# Patient Record
Sex: FEMALE | Race: WHITE | NOT HISPANIC OR LATINO | Employment: OTHER | URBAN - METROPOLITAN AREA
[De-identification: names, ages, dates, MRNs, and addresses within clinical notes are randomized per-mention and may not be internally consistent; named-entity substitution may affect disease eponyms.]

---

## 2018-02-26 ENCOUNTER — RECORDS - HEALTHEAST (OUTPATIENT)
Dept: LAB | Facility: CLINIC | Age: 78
End: 2018-02-26

## 2018-02-28 ENCOUNTER — RECORDS - HEALTHEAST (OUTPATIENT)
Dept: LAB | Facility: CLINIC | Age: 78
End: 2018-02-28

## 2018-02-28 LAB
QTF INTERPRETATION: NORMAL
QTF MITOGEN - NIL: >10 IU/ML
QTF NIL: 0.06 IU/ML
QTF RESULT: NEGATIVE
QTF TB ANTIGEN - NIL: 0.05 IU/ML

## 2018-03-01 LAB
ANION GAP SERPL CALCULATED.3IONS-SCNC: 7 MMOL/L (ref 5–18)
BUN SERPL-MCNC: 12 MG/DL (ref 8–28)
CALCIUM SERPL-MCNC: 9.5 MG/DL (ref 8.5–10.5)
CHLORIDE BLD-SCNC: 103 MMOL/L (ref 98–107)
CO2 SERPL-SCNC: 30 MMOL/L (ref 22–31)
CREAT SERPL-MCNC: 0.64 MG/DL (ref 0.6–1.1)
GFR SERPL CREATININE-BSD FRML MDRD: >60 ML/MIN/1.73M2
GLUCOSE BLD-MCNC: 103 MG/DL (ref 70–125)
POTASSIUM BLD-SCNC: 3.8 MMOL/L (ref 3.5–5)
SODIUM SERPL-SCNC: 140 MMOL/L (ref 136–145)

## 2018-03-02 ENCOUNTER — RECORDS - HEALTHEAST (OUTPATIENT)
Dept: LAB | Facility: CLINIC | Age: 78
End: 2018-03-02

## 2018-03-05 LAB
ANION GAP SERPL CALCULATED.3IONS-SCNC: 7 MMOL/L (ref 5–18)
BUN SERPL-MCNC: 12 MG/DL (ref 8–28)
CALCIUM SERPL-MCNC: 9.7 MG/DL (ref 8.5–10.5)
CHLORIDE BLD-SCNC: 101 MMOL/L (ref 98–107)
CO2 SERPL-SCNC: 30 MMOL/L (ref 22–31)
CREAT SERPL-MCNC: 0.59 MG/DL (ref 0.6–1.1)
ERYTHROCYTE [DISTWIDTH] IN BLOOD BY AUTOMATED COUNT: 16.6 % (ref 11–14.5)
FERRITIN SERPL-MCNC: 45 NG/ML (ref 10–130)
GFR SERPL CREATININE-BSD FRML MDRD: >60 ML/MIN/1.73M2
GLUCOSE BLD-MCNC: 106 MG/DL (ref 70–125)
HCT VFR BLD AUTO: 33.7 % (ref 35–47)
HGB BLD-MCNC: 10.2 G/DL (ref 12–16)
IRON SATN MFR SERPL: 9 % (ref 20–50)
IRON SERPL-MCNC: 26 UG/DL (ref 42–175)
IRON SERPL-MCNC: 26 UG/DL (ref 42–175)
MCH RBC QN AUTO: 30.3 PG (ref 27–34)
MCHC RBC AUTO-ENTMCNC: 30.3 G/DL (ref 32–36)
MCV RBC AUTO: 100 FL (ref 80–100)
PLATELET # BLD AUTO: 311 THOU/UL (ref 140–440)
PMV BLD AUTO: 11.4 FL (ref 8.5–12.5)
POTASSIUM BLD-SCNC: 3.8 MMOL/L (ref 3.5–5)
RBC # BLD AUTO: 3.37 MILL/UL (ref 3.8–5.4)
RETICS # AUTO: 0.07 MILL/UL (ref 0.01–0.11)
SODIUM SERPL-SCNC: 138 MMOL/L (ref 136–145)
TIBC SERPL-MCNC: 298 UG/DL (ref 313–563)
TRANSFERRIN SERPL-MCNC: 238 MG/DL (ref 212–360)
WBC: 8.6 THOU/UL (ref 4–11)

## 2018-03-16 ENCOUNTER — RECORDS - HEALTHEAST (OUTPATIENT)
Dept: LAB | Facility: CLINIC | Age: 78
End: 2018-03-16

## 2018-03-18 ENCOUNTER — RECORDS - HEALTHEAST (OUTPATIENT)
Dept: LAB | Facility: CLINIC | Age: 78
End: 2018-03-18

## 2018-03-19 LAB
25(OH)D3 SERPL-MCNC: 69.9 NG/ML (ref 30–80)
ANION GAP SERPL CALCULATED.3IONS-SCNC: 10 MMOL/L (ref 5–18)
BASOPHILS # BLD AUTO: 0 THOU/UL (ref 0–0.2)
BASOPHILS NFR BLD AUTO: 0 % (ref 0–2)
BUN SERPL-MCNC: 17 MG/DL (ref 8–28)
CALCIUM SERPL-MCNC: 9.3 MG/DL (ref 8.5–10.5)
CHLORIDE BLD-SCNC: 100 MMOL/L (ref 98–107)
CO2 SERPL-SCNC: 26 MMOL/L (ref 22–31)
CREAT SERPL-MCNC: 0.62 MG/DL (ref 0.6–1.1)
EOSINOPHIL # BLD AUTO: 0 THOU/UL (ref 0–0.4)
EOSINOPHIL NFR BLD AUTO: 0 % (ref 0–6)
ERYTHROCYTE [DISTWIDTH] IN BLOOD BY AUTOMATED COUNT: 14.8 % (ref 11–14.5)
GFR SERPL CREATININE-BSD FRML MDRD: >60 ML/MIN/1.73M2
GLUCOSE BLD-MCNC: 103 MG/DL (ref 70–125)
HCT VFR BLD AUTO: 37.4 % (ref 35–47)
HGB BLD-MCNC: 11.7 G/DL (ref 12–16)
LYMPHOCYTES # BLD AUTO: 1.9 THOU/UL (ref 0.8–4.4)
LYMPHOCYTES NFR BLD AUTO: 22 % (ref 20–40)
MCH RBC QN AUTO: 30.3 PG (ref 27–34)
MCHC RBC AUTO-ENTMCNC: 31.3 G/DL (ref 32–36)
MCV RBC AUTO: 97 FL (ref 80–100)
MONOCYTES # BLD AUTO: 0.7 THOU/UL (ref 0–0.9)
MONOCYTES NFR BLD AUTO: 8 % (ref 2–10)
NEUTROPHILS # BLD AUTO: 5.9 THOU/UL (ref 2–7.7)
NEUTROPHILS NFR BLD AUTO: 70 % (ref 50–70)
PLATELET # BLD AUTO: 214 THOU/UL (ref 140–440)
PMV BLD AUTO: 11.6 FL (ref 8.5–12.5)
POTASSIUM BLD-SCNC: 3.7 MMOL/L (ref 3.5–5)
RBC # BLD AUTO: 3.86 MILL/UL (ref 3.8–5.4)
SODIUM SERPL-SCNC: 136 MMOL/L (ref 136–145)
TSH SERPL DL<=0.005 MIU/L-ACNC: 3.4 UIU/ML (ref 0.3–5)
WBC: 8.5 THOU/UL (ref 4–11)

## 2018-03-23 ENCOUNTER — RECORDS - HEALTHEAST (OUTPATIENT)
Dept: LAB | Facility: CLINIC | Age: 78
End: 2018-03-23

## 2018-03-23 LAB
ANION GAP SERPL CALCULATED.3IONS-SCNC: 10 MMOL/L (ref 5–18)
BUN SERPL-MCNC: 9 MG/DL (ref 8–28)
CALCIUM SERPL-MCNC: 8.5 MG/DL (ref 8.5–10.5)
CHLORIDE BLD-SCNC: 99 MMOL/L (ref 98–107)
CO2 SERPL-SCNC: 25 MMOL/L (ref 22–31)
CREAT SERPL-MCNC: 0.61 MG/DL (ref 0.6–1.1)
GFR SERPL CREATININE-BSD FRML MDRD: >60 ML/MIN/1.73M2
GLUCOSE BLD-MCNC: 106 MG/DL (ref 70–125)
POTASSIUM BLD-SCNC: 3.9 MMOL/L (ref 3.5–5)
SODIUM SERPL-SCNC: 134 MMOL/L (ref 136–145)

## 2018-04-30 ENCOUNTER — RECORDS - HEALTHEAST (OUTPATIENT)
Dept: LAB | Facility: CLINIC | Age: 78
End: 2018-04-30

## 2018-05-01 LAB
ANION GAP SERPL CALCULATED.3IONS-SCNC: 8 MMOL/L (ref 5–18)
BUN SERPL-MCNC: 17 MG/DL (ref 8–28)
CALCIUM SERPL-MCNC: 9.8 MG/DL (ref 8.5–10.5)
CHLORIDE BLD-SCNC: 102 MMOL/L (ref 98–107)
CO2 SERPL-SCNC: 29 MMOL/L (ref 22–31)
CREAT SERPL-MCNC: 0.65 MG/DL (ref 0.6–1.1)
GFR SERPL CREATININE-BSD FRML MDRD: >60 ML/MIN/1.73M2
GLUCOSE BLD-MCNC: 94 MG/DL (ref 70–125)
POTASSIUM BLD-SCNC: 3.8 MMOL/L (ref 3.5–5)
SODIUM SERPL-SCNC: 139 MMOL/L (ref 136–145)

## 2018-06-19 ENCOUNTER — RECORDS - HEALTHEAST (OUTPATIENT)
Dept: LAB | Facility: CLINIC | Age: 78
End: 2018-06-19

## 2018-06-20 LAB
CHOLEST SERPL-MCNC: 116 MG/DL
FASTING STATUS PATIENT QL REPORTED: YES
FERRITIN SERPL-MCNC: 72 NG/ML (ref 10–130)
HDLC SERPL-MCNC: 44 MG/DL
IRON SATN MFR SERPL: 19 % (ref 20–50)
IRON SERPL-MCNC: 49 UG/DL (ref 42–175)
IRON SERPL-MCNC: 49 UG/DL (ref 42–175)
LDLC SERPL CALC-MCNC: 52 MG/DL
TIBC SERPL-MCNC: 252 UG/DL (ref 313–563)
TRANSFERRIN SERPL-MCNC: 202 MG/DL (ref 212–360)
TRIGL SERPL-MCNC: 99 MG/DL
TSH SERPL DL<=0.005 MIU/L-ACNC: 3.47 UIU/ML (ref 0.3–5)

## 2018-06-21 LAB — 25(OH)D3 SERPL-MCNC: 78.2 NG/ML (ref 30–80)

## 2018-08-27 ENCOUNTER — RECORDS - HEALTHEAST (OUTPATIENT)
Dept: LAB | Facility: CLINIC | Age: 78
End: 2018-08-27

## 2018-08-28 LAB
BASOPHILS # BLD AUTO: 0.1 THOU/UL (ref 0–0.2)
BASOPHILS NFR BLD AUTO: 1 % (ref 0–2)
EOSINOPHIL # BLD AUTO: 0.3 THOU/UL (ref 0–0.4)
EOSINOPHIL NFR BLD AUTO: 3 % (ref 0–6)
ERYTHROCYTE [DISTWIDTH] IN BLOOD BY AUTOMATED COUNT: 14.2 % (ref 11–14.5)
HCT VFR BLD AUTO: 37.4 % (ref 35–47)
HGB BLD-MCNC: 11.5 G/DL (ref 12–16)
LYMPHOCYTES # BLD AUTO: 2.4 THOU/UL (ref 0.8–4.4)
LYMPHOCYTES NFR BLD AUTO: 29 % (ref 20–40)
MCH RBC QN AUTO: 29.1 PG (ref 27–34)
MCHC RBC AUTO-ENTMCNC: 30.7 G/DL (ref 32–36)
MCV RBC AUTO: 95 FL (ref 80–100)
MONOCYTES # BLD AUTO: 0.8 THOU/UL (ref 0–0.9)
MONOCYTES NFR BLD AUTO: 10 % (ref 2–10)
NEUTROPHILS # BLD AUTO: 4.8 THOU/UL (ref 2–7.7)
NEUTROPHILS NFR BLD AUTO: 58 % (ref 50–70)
PLATELET # BLD AUTO: 243 THOU/UL (ref 140–440)
PMV BLD AUTO: 10.8 FL (ref 8.5–12.5)
RBC # BLD AUTO: 3.95 MILL/UL (ref 3.8–5.4)
WBC: 8.4 THOU/UL (ref 4–11)

## 2019-01-08 ENCOUNTER — RECORDS - HEALTHEAST (OUTPATIENT)
Dept: LAB | Facility: CLINIC | Age: 79
End: 2019-01-08

## 2019-01-08 LAB
ANION GAP SERPL CALCULATED.3IONS-SCNC: 10 MMOL/L (ref 5–18)
BASOPHILS # BLD AUTO: 0 THOU/UL (ref 0–0.2)
BASOPHILS NFR BLD AUTO: 0 % (ref 0–2)
BUN SERPL-MCNC: 24 MG/DL (ref 8–28)
CALCIUM SERPL-MCNC: 9 MG/DL (ref 8.5–10.5)
CHLORIDE BLD-SCNC: 99 MMOL/L (ref 98–107)
CO2 SERPL-SCNC: 28 MMOL/L (ref 22–31)
CREAT SERPL-MCNC: 0.73 MG/DL (ref 0.6–1.1)
EOSINOPHIL # BLD AUTO: 0 THOU/UL (ref 0–0.4)
EOSINOPHIL NFR BLD AUTO: 0 % (ref 0–6)
ERYTHROCYTE [DISTWIDTH] IN BLOOD BY AUTOMATED COUNT: 14.7 % (ref 11–14.5)
GFR SERPL CREATININE-BSD FRML MDRD: >60 ML/MIN/1.73M2
GLUCOSE BLD-MCNC: 133 MG/DL (ref 70–125)
HCT VFR BLD AUTO: 39.9 % (ref 35–47)
HGB BLD-MCNC: 12.6 G/DL (ref 12–16)
LYMPHOCYTES # BLD AUTO: 0.3 THOU/UL (ref 0.8–4.4)
LYMPHOCYTES NFR BLD AUTO: 2 % (ref 20–40)
MAGNESIUM SERPL-MCNC: 2.1 MG/DL (ref 1.8–2.6)
MCH RBC QN AUTO: 29.6 PG (ref 27–34)
MCHC RBC AUTO-ENTMCNC: 31.6 G/DL (ref 32–36)
MCV RBC AUTO: 94 FL (ref 80–100)
MONOCYTES # BLD AUTO: 0.2 THOU/UL (ref 0–0.9)
MONOCYTES NFR BLD AUTO: 1 % (ref 2–10)
NEUTROPHILS # BLD AUTO: 14.2 THOU/UL (ref 2–7.7)
NEUTROPHILS NFR BLD AUTO: 96 % (ref 50–70)
PHOSPHATE SERPL-MCNC: 2.9 MG/DL (ref 2.5–4.5)
PLATELET # BLD AUTO: 207 THOU/UL (ref 140–440)
PMV BLD AUTO: 11.1 FL (ref 8.5–12.5)
POTASSIUM BLD-SCNC: 4.4 MMOL/L (ref 3.5–5)
RBC # BLD AUTO: 4.25 MILL/UL (ref 3.8–5.4)
SODIUM SERPL-SCNC: 137 MMOL/L (ref 136–145)
WBC: 14.8 THOU/UL (ref 4–11)

## 2019-04-05 ENCOUNTER — RECORDS - HEALTHEAST (OUTPATIENT)
Dept: LAB | Facility: CLINIC | Age: 79
End: 2019-04-05

## 2019-04-08 ENCOUNTER — RECORDS - HEALTHEAST (OUTPATIENT)
Dept: LAB | Facility: CLINIC | Age: 79
End: 2019-04-08

## 2019-04-08 LAB
ANION GAP SERPL CALCULATED.3IONS-SCNC: 8 MMOL/L (ref 5–18)
BUN SERPL-MCNC: 15 MG/DL (ref 8–28)
CALCIUM SERPL-MCNC: 9.4 MG/DL (ref 8.5–10.5)
CHLORIDE BLD-SCNC: 98 MMOL/L (ref 98–107)
CO2 SERPL-SCNC: 33 MMOL/L (ref 22–31)
CREAT SERPL-MCNC: 0.63 MG/DL (ref 0.6–1.1)
CREAT UR-MCNC: 33.7 MG/DL
CREAT UR-MCNC: 33.7 MG/DL
ERYTHROCYTE [DISTWIDTH] IN BLOOD BY AUTOMATED COUNT: 13.6 % (ref 11–14.5)
GFR SERPL CREATININE-BSD FRML MDRD: >60 ML/MIN/1.73M2
GLUCOSE BLD-MCNC: 144 MG/DL (ref 70–125)
HBA1C MFR BLD: 6.5 % (ref 4.2–6.1)
HCT VFR BLD AUTO: 38.4 % (ref 35–47)
HGB BLD-MCNC: 11.8 G/DL (ref 12–16)
MCH RBC QN AUTO: 29.7 PG (ref 27–34)
MCHC RBC AUTO-ENTMCNC: 30.7 G/DL (ref 32–36)
MCV RBC AUTO: 97 FL (ref 80–100)
MICROALBUMIN UR-MCNC: <0.5 MG/DL (ref 0–1.99)
MICROALBUMIN/CREAT UR: NORMAL MG/G
PLATELET # BLD AUTO: 198 THOU/UL (ref 140–440)
PMV BLD AUTO: 10.9 FL (ref 8.5–12.5)
POTASSIUM BLD-SCNC: 4.1 MMOL/L (ref 3.5–5)
PROTEIN, RANDOM URINE - HISTORICAL: 9 MG/DL
PROTEIN/CREAT RATIO, RANDOM UR: 0.27
RBC # BLD AUTO: 3.97 MILL/UL (ref 3.8–5.4)
SODIUM SERPL-SCNC: 139 MMOL/L (ref 136–145)
TSH SERPL DL<=0.005 MIU/L-ACNC: 3.54 UIU/ML (ref 0.3–5)
WBC: 7 THOU/UL (ref 4–11)

## 2020-01-06 ENCOUNTER — RECORDS - HEALTHEAST (OUTPATIENT)
Dept: LAB | Facility: CLINIC | Age: 80
End: 2020-01-06

## 2020-01-06 LAB
ANION GAP SERPL CALCULATED.3IONS-SCNC: 10 MMOL/L (ref 5–18)
BUN SERPL-MCNC: 17 MG/DL (ref 8–28)
CALCIUM SERPL-MCNC: 9.3 MG/DL (ref 8.5–10.5)
CHLORIDE BLD-SCNC: 103 MMOL/L (ref 98–107)
CO2 SERPL-SCNC: 27 MMOL/L (ref 22–31)
CREAT SERPL-MCNC: 0.68 MG/DL (ref 0.6–1.1)
ERYTHROCYTE [DISTWIDTH] IN BLOOD BY AUTOMATED COUNT: 13.4 % (ref 11–14.5)
GFR SERPL CREATININE-BSD FRML MDRD: >60 ML/MIN/1.73M2
GLUCOSE BLD-MCNC: 123 MG/DL (ref 70–125)
HBA1C MFR BLD: 6.4 % (ref 4.2–6.1)
HCT VFR BLD AUTO: 39.4 % (ref 35–47)
HGB BLD-MCNC: 11.8 G/DL (ref 12–16)
MCH RBC QN AUTO: 29.6 PG (ref 27–34)
MCHC RBC AUTO-ENTMCNC: 29.9 G/DL (ref 32–36)
MCV RBC AUTO: 99 FL (ref 80–100)
PLATELET # BLD AUTO: 218 THOU/UL (ref 140–440)
PMV BLD AUTO: 11.1 FL (ref 8.5–12.5)
POTASSIUM BLD-SCNC: 4.6 MMOL/L (ref 3.5–5)
RBC # BLD AUTO: 3.99 MILL/UL (ref 3.8–5.4)
SODIUM SERPL-SCNC: 140 MMOL/L (ref 136–145)
TSH SERPL DL<=0.005 MIU/L-ACNC: 5.99 UIU/ML (ref 0.3–5)
WBC: 9.2 THOU/UL (ref 4–11)

## 2020-01-08 ENCOUNTER — RECORDS - HEALTHEAST (OUTPATIENT)
Dept: LAB | Facility: CLINIC | Age: 80
End: 2020-01-08

## 2020-01-08 LAB
T3FREE SERPL-MCNC: 1.9 PG/ML (ref 1.9–3.9)
T4 FREE SERPL-MCNC: 0.5 NG/DL (ref 0.7–1.8)

## 2020-02-11 ENCOUNTER — RECORDS - HEALTHEAST (OUTPATIENT)
Dept: LAB | Facility: CLINIC | Age: 80
End: 2020-02-11

## 2020-02-12 LAB — TSH SERPL DL<=0.005 MIU/L-ACNC: 10.81 UIU/ML (ref 0.3–5)

## 2020-03-26 ENCOUNTER — RECORDS - HEALTHEAST (OUTPATIENT)
Dept: LAB | Facility: CLINIC | Age: 80
End: 2020-03-26

## 2020-03-26 LAB — TSH SERPL DL<=0.005 MIU/L-ACNC: 1.13 UIU/ML (ref 0.3–5)

## 2020-11-10 ENCOUNTER — TRANSFERRED RECORDS (OUTPATIENT)
Dept: HEALTH INFORMATION MANAGEMENT | Facility: CLINIC | Age: 80
End: 2020-11-10

## 2020-11-11 ENCOUNTER — HOSPITAL ENCOUNTER (INPATIENT)
Facility: CLINIC | Age: 80
LOS: 14 days | Discharge: NURSING, REHAB OR LONG-TERM CARE | DRG: 177 | End: 2020-11-25
Attending: INTERNAL MEDICINE | Admitting: HOSPITALIST
Payer: COMMERCIAL

## 2020-11-11 ENCOUNTER — TRANSFERRED RECORDS (OUTPATIENT)
Dept: HEALTH INFORMATION MANAGEMENT | Facility: CLINIC | Age: 80
End: 2020-11-11

## 2020-11-11 DIAGNOSIS — J44.1 CHRONIC OBSTRUCTIVE PULMONARY DISEASE WITH ACUTE EXACERBATION (H): ICD-10-CM

## 2020-11-11 DIAGNOSIS — J96.21 ACUTE ON CHRONIC RESPIRATORY FAILURE WITH HYPOXIA (H): Primary | ICD-10-CM

## 2020-11-11 DIAGNOSIS — U07.1 PNEUMONIA DUE TO 2019 NOVEL CORONAVIRUS: ICD-10-CM

## 2020-11-11 DIAGNOSIS — E78.2 MIXED HYPERLIPIDEMIA: ICD-10-CM

## 2020-11-11 DIAGNOSIS — J12.82 PNEUMONIA DUE TO 2019 NOVEL CORONAVIRUS: ICD-10-CM

## 2020-11-11 DIAGNOSIS — U07.1 CLINICAL DIAGNOSIS OF COVID-19: ICD-10-CM

## 2020-11-11 DIAGNOSIS — I48.0 PAROXYSMAL ATRIAL FIBRILLATION WITH RVR (H): ICD-10-CM

## 2020-11-11 DIAGNOSIS — J44.1 COPD EXACERBATION (H): ICD-10-CM

## 2020-11-11 DIAGNOSIS — I50.21 ACUTE SYSTOLIC HEART FAILURE (H): ICD-10-CM

## 2020-11-11 PROBLEM — J96.90 RESPIRATORY FAILURE (H): Status: ACTIVE | Noted: 2020-11-11

## 2020-11-11 LAB
ALBUMIN SERPL-MCNC: 2.7 G/DL (ref 3.4–5)
ALP SERPL-CCNC: 63 U/L (ref 40–150)
ALT SERPL W P-5'-P-CCNC: 23 U/L (ref 0–50)
ANION GAP SERPL CALCULATED.3IONS-SCNC: 4 MMOL/L (ref 3–14)
AST SERPL W P-5'-P-CCNC: 36 U/L (ref 0–45)
BILIRUB DIRECT SERPL-MCNC: <0.1 MG/DL (ref 0–0.2)
BILIRUB SERPL-MCNC: 0.2 MG/DL (ref 0.2–1.3)
BUN SERPL-MCNC: 22 MG/DL (ref 7–30)
CALCIUM SERPL-MCNC: 7.9 MG/DL (ref 8.5–10.1)
CHLORIDE SERPL-SCNC: 109 MMOL/L (ref 94–109)
CO2 SERPL-SCNC: 28 MMOL/L (ref 20–32)
CREAT SERPL-MCNC: 0.55 MG/DL (ref 0.52–1.04)
GFR SERPL CREATININE-BSD FRML MDRD: 90 ML/MIN/{1.73_M2}
GLUCOSE BLDC GLUCOMTR-MCNC: 163 MG/DL (ref 70–99)
GLUCOSE SERPL-MCNC: 146 MG/DL (ref 70–99)
HBA1C MFR BLD: 6.2 % (ref 0–5.6)
POTASSIUM SERPL-SCNC: 4.6 MMOL/L (ref 3.4–5.3)
PROT SERPL-MCNC: 6.7 G/DL (ref 6.8–8.8)
RETICS # AUTO: 24.1 10E9/L (ref 25–95)
RETICS/RBC NFR AUTO: 0.8 % (ref 0.5–2)
SODIUM SERPL-SCNC: 141 MMOL/L (ref 133–144)

## 2020-11-11 PROCEDURE — 999N000157 HC STATISTIC RCP TIME EA 10 MIN

## 2020-11-11 PROCEDURE — 36415 COLL VENOUS BLD VENIPUNCTURE: CPT | Performed by: HOSPITALIST

## 2020-11-11 PROCEDURE — 120N000001 HC R&B MED SURG/OB

## 2020-11-11 PROCEDURE — 120N000013 HC R&B IMCU

## 2020-11-11 PROCEDURE — 250N000009 HC RX 250: Performed by: HOSPITALIST

## 2020-11-11 PROCEDURE — 80048 BASIC METABOLIC PNL TOTAL CA: CPT | Performed by: HOSPITALIST

## 2020-11-11 PROCEDURE — XW033E5 INTRODUCTION OF REMDESIVIR ANTI-INFECTIVE INTO PERIPHERAL VEIN, PERCUTANEOUS APPROACH, NEW TECHNOLOGY GROUP 5: ICD-10-PCS | Performed by: HOSPITALIST

## 2020-11-11 PROCEDURE — 258N000003 HC RX IP 258 OP 636: Performed by: HOSPITALIST

## 2020-11-11 PROCEDURE — 250N000011 HC RX IP 250 OP 636: Performed by: HOSPITALIST

## 2020-11-11 PROCEDURE — 94660 CPAP INITIATION&MGMT: CPT

## 2020-11-11 PROCEDURE — 83036 HEMOGLOBIN GLYCOSYLATED A1C: CPT | Performed by: HOSPITALIST

## 2020-11-11 PROCEDURE — 80076 HEPATIC FUNCTION PANEL: CPT | Performed by: HOSPITALIST

## 2020-11-11 PROCEDURE — 99223 1ST HOSP IP/OBS HIGH 75: CPT | Mod: AI | Performed by: HOSPITALIST

## 2020-11-11 PROCEDURE — 999N001017 HC STATISTIC GLUCOSE BY METER IP

## 2020-11-11 PROCEDURE — 85045 AUTOMATED RETICULOCYTE COUNT: CPT | Performed by: HOSPITALIST

## 2020-11-11 RX ORDER — NICOTINE POLACRILEX 4 MG
15-30 LOZENGE BUCCAL
Status: DISCONTINUED | OUTPATIENT
Start: 2020-11-11 | End: 2020-11-23

## 2020-11-11 RX ORDER — ATORVASTATIN CALCIUM 40 MG/1
40 TABLET, FILM COATED ORAL AT BEDTIME
COMMUNITY

## 2020-11-11 RX ORDER — GUAIFENESIN AND DEXTROMETHORPHAN HYDROBROMIDE 100; 10 MG/5ML; MG/5ML
10 SOLUTION ORAL EVERY 4 HOURS PRN
COMMUNITY

## 2020-11-11 RX ORDER — DIPHENHYDRAMINE HCL 25 MG
1 CAPSULE ORAL 4 TIMES DAILY
COMMUNITY

## 2020-11-11 RX ORDER — LABETALOL HYDROCHLORIDE 5 MG/ML
10 INJECTION, SOLUTION INTRAVENOUS
Status: DISCONTINUED | OUTPATIENT
Start: 2020-11-11 | End: 2020-11-21

## 2020-11-11 RX ORDER — POLYETHYLENE GLYCOL 3350 17 G/17G
17 POWDER, FOR SOLUTION ORAL DAILY PRN
Status: DISCONTINUED | OUTPATIENT
Start: 2020-11-11 | End: 2020-11-25 | Stop reason: HOSPADM

## 2020-11-11 RX ORDER — METOPROLOL TARTRATE 25 MG/1
25 TABLET, FILM COATED ORAL 2 TIMES DAILY
Status: ON HOLD | COMMUNITY
End: 2020-11-24

## 2020-11-11 RX ORDER — ACETAMINOPHEN 325 MG/1
650 TABLET ORAL 3 TIMES DAILY
COMMUNITY

## 2020-11-11 RX ORDER — SENNOSIDES A AND B 8.6 MG/1
1 TABLET, FILM COATED ORAL 2 TIMES DAILY
COMMUNITY

## 2020-11-11 RX ORDER — METOPROLOL TARTRATE 1 MG/ML
2.5 INJECTION, SOLUTION INTRAVENOUS EVERY 6 HOURS
Status: DISCONTINUED | OUTPATIENT
Start: 2020-11-11 | End: 2020-11-18

## 2020-11-11 RX ORDER — ACETAMINOPHEN 325 MG/1
650 TABLET ORAL 3 TIMES DAILY PRN
COMMUNITY

## 2020-11-11 RX ORDER — ASPIRIN 81 MG/1
81 TABLET, CHEWABLE ORAL DAILY
COMMUNITY

## 2020-11-11 RX ORDER — LIDOCAINE 40 MG/G
CREAM TOPICAL
Status: DISCONTINUED | OUTPATIENT
Start: 2020-11-11 | End: 2020-11-20

## 2020-11-11 RX ORDER — DEXTROSE MONOHYDRATE 25 G/50ML
25-50 INJECTION, SOLUTION INTRAVENOUS
Status: DISCONTINUED | OUTPATIENT
Start: 2020-11-11 | End: 2020-11-23

## 2020-11-11 RX ORDER — DOXYCYCLINE 100 MG/1
100 CAPSULE ORAL 2 TIMES DAILY
Status: ON HOLD | COMMUNITY
End: 2020-11-24

## 2020-11-11 RX ORDER — HYDRALAZINE HYDROCHLORIDE 20 MG/ML
10 INJECTION INTRAMUSCULAR; INTRAVENOUS EVERY 4 HOURS PRN
Status: DISCONTINUED | OUTPATIENT
Start: 2020-11-11 | End: 2020-11-21

## 2020-11-11 RX ORDER — LEVOTHYROXINE SODIUM 50 UG/1
50 TABLET ORAL DAILY
COMMUNITY

## 2020-11-11 RX ORDER — CARBOXYMETHYLCELLULOSE SODIUM 5 MG/ML
1 SOLUTION/ DROPS OPHTHALMIC
Status: DISCONTINUED | OUTPATIENT
Start: 2020-11-11 | End: 2020-11-25 | Stop reason: HOSPADM

## 2020-11-11 RX ORDER — NITROGLYCERIN 0.4 MG/1
0.4 TABLET SUBLINGUAL EVERY 5 MIN PRN
Status: DISCONTINUED | OUTPATIENT
Start: 2020-11-11 | End: 2020-11-21

## 2020-11-11 RX ORDER — IPRATROPIUM BROMIDE AND ALBUTEROL 20; 100 UG/1; UG/1
1 SPRAY, METERED RESPIRATORY (INHALATION) EVERY 4 HOURS PRN
COMMUNITY

## 2020-11-11 RX ORDER — POLYETHYLENE GLYCOL 3350 17 G/17G
1 POWDER, FOR SOLUTION ORAL DAILY
COMMUNITY

## 2020-11-11 RX ORDER — NITROGLYCERIN 0.4 MG/1
0.4 TABLET SUBLINGUAL EVERY 5 MIN PRN
COMMUNITY

## 2020-11-11 RX ORDER — ALBUTEROL SULFATE 90 UG/1
2 AEROSOL, METERED RESPIRATORY (INHALATION) 4 TIMES DAILY PRN
Status: DISCONTINUED | OUTPATIENT
Start: 2020-11-11 | End: 2020-11-25 | Stop reason: HOSPADM

## 2020-11-11 RX ORDER — ACETAMINOPHEN 650 MG/1
650 SUPPOSITORY RECTAL EVERY 4 HOURS PRN
Status: DISCONTINUED | OUTPATIENT
Start: 2020-11-11 | End: 2020-11-25 | Stop reason: HOSPADM

## 2020-11-11 RX ORDER — PAROXETINE 10 MG/1
10 TABLET, FILM COATED ORAL EVERY MORNING
COMMUNITY

## 2020-11-11 RX ORDER — DEXAMETHASONE SODIUM PHOSPHATE 4 MG/ML
8 INJECTION, SOLUTION INTRA-ARTICULAR; INTRALESIONAL; INTRAMUSCULAR; INTRAVENOUS; SOFT TISSUE EVERY 24 HOURS
Status: DISCONTINUED | OUTPATIENT
Start: 2020-11-11 | End: 2020-11-12

## 2020-11-11 RX ORDER — MAGNESIUM HYDROXIDE/ALUMINUM HYDROXICE/SIMETHICONE 120; 1200; 1200 MG/30ML; MG/30ML; MG/30ML
30 SUSPENSION ORAL 3 TIMES DAILY PRN
COMMUNITY

## 2020-11-11 RX ORDER — LIDOCAINE 40 MG/G
CREAM TOPICAL
Status: DISCONTINUED | OUTPATIENT
Start: 2020-11-11 | End: 2020-11-21

## 2020-11-11 RX ORDER — ONDANSETRON 4 MG/1
4 TABLET, ORALLY DISINTEGRATING ORAL EVERY 6 HOURS PRN
Status: DISCONTINUED | OUTPATIENT
Start: 2020-11-11 | End: 2020-11-25 | Stop reason: HOSPADM

## 2020-11-11 RX ORDER — ONDANSETRON 2 MG/ML
4 INJECTION INTRAMUSCULAR; INTRAVENOUS EVERY 6 HOURS PRN
Status: DISCONTINUED | OUTPATIENT
Start: 2020-11-11 | End: 2020-11-25 | Stop reason: HOSPADM

## 2020-11-11 RX ORDER — SODIUM CHLORIDE 9 MG/ML
INJECTION, SOLUTION INTRAVENOUS CONTINUOUS
Status: DISCONTINUED | OUTPATIENT
Start: 2020-11-11 | End: 2020-11-21

## 2020-11-11 RX ORDER — ACETAMINOPHEN 325 MG/1
650 TABLET ORAL EVERY 4 HOURS PRN
Status: DISCONTINUED | OUTPATIENT
Start: 2020-11-11 | End: 2020-11-25 | Stop reason: HOSPADM

## 2020-11-11 RX ADMIN — REMDESIVIR 200 MG: 100 INJECTION, POWDER, LYOPHILIZED, FOR SOLUTION INTRAVENOUS at 22:32

## 2020-11-11 RX ADMIN — DEXAMETHASONE SODIUM PHOSPHATE 8 MG: 4 INJECTION, SOLUTION INTRAMUSCULAR; INTRAVENOUS at 19:10

## 2020-11-11 RX ADMIN — ENOXAPARIN SODIUM 40 MG: 40 INJECTION SUBCUTANEOUS at 19:10

## 2020-11-11 RX ADMIN — METOPROLOL TARTRATE 2.5 MG: 5 INJECTION INTRAVENOUS at 19:10

## 2020-11-11 RX ADMIN — FAMOTIDINE 20 MG: 10 INJECTION INTRAVENOUS at 22:32

## 2020-11-11 ASSESSMENT — MIFFLIN-ST. JEOR: SCORE: 1340.9

## 2020-11-11 ASSESSMENT — ACTIVITIES OF DAILY LIVING (ADL)
ADLS_ACUITY_SCORE: 19
ADLS_ACUITY_SCORE: 19

## 2020-11-11 NOTE — H&P
[facility]  History and Physical - Hospitalist Service       Date of Admission:  11/11/2020    Assessment & Plan   Carolina Farnces is a 79 year old female with medical history significant for COPD/emphysema with chronic hypoxemic respiratory failure on 4 LPM NC O2, depression, chronic venous insufficiency, paroxysmal A. fib, hypothyroidism, oropharyngeal dysphagia, hypertension, chronic constipation.  She has been transferred to [facility] due to lack of inpatient bed at outside hospital and is being admitted on 11/11/2020 for further management.    COVID 19 and viral pneumonia  Acute hypoxic respiratory failure  COPD/emphysema with chronic hypoxemic respiratory failure on 4 LPM NC O2  Worsened dyspnea/hypoxia, COVID-19 PCR positive from outside ER 10/10.  CXR report states chronic scarring and interstitial opacity.  Patient progressively hypoxic needing BiPAP.  Received Rocephin and azithromycin and Solu-Medrol in outside hospital.  -Start remdesivir and IV dexamethasone.  -I will hold off on antibiotic.  Follow daily chest x-ray, CBC, procalcitonin.  If procalcitonin is elevated, will consider antibiotic.  -Daily labs as per protocol for COVID-19.  -Follow D-dimer, subcu Lovenox for DVT prophylaxis for now, adjust if needed based on D-dimer level.  -BiPAP, n.p.o.,  IV hydration.  -If unable to wean off BiPAP in next 24 hours, will consider tube feeding.    Chronic stable medical problems  -Depression  -Chronic venous insufficiency  -Paroxysmal A. fib  -Hypothyroidism  -Oropharyngeal dysphagia  -Hypertension  -Chronic constipation  -Morbid obesity  Changed her PTA metoprolol to IV metoprolol with hold parameters.  Resume other medications when able to take p.o. or if Jal placed.       Diet: NPO for Medical/Clinical Reasons Except for: Meds    DVT Prophylaxis: Heparin SQ  Poon Catheter: not present  Code Status:   Full code       Disposition Plan   Expected discharge: 2+ days, recommended to TBD  once Pending clinical course, respiratory failure improves.  Entered: Mikaela Garay MD 11/11/2020, 4:22 PM     The patient's care was discussed with the Bedside Nurse and Patient.    Mikaela Garay MD  [facility]  Contact information available via [facility] Paging/Directory      ______________________________________________________________________    Chief Complaint   Reason for admission/transfer: COVID-19 pneumonia and acute hypoxemic respiratory failure    History is obtained from the patient, chart review.    History of Present Illness   Carolina Frances is a 79 year old female with medical history significant for COPD/emphysema with chronic hypoxemic respiratory failure on 4 LPM NC O2, depression, chronic venous insufficiency, paroxysmal A. fib, hypothyroidism, oropharyngeal dysphagia, hypertension, chronic constipation.      Patient presented to outside ER with concern for hypoxia.  Patient is a resident of nursing facility.  History of COPD, baseline supplemental O2 use at 4 LPM.  Apparently, when the staff went to check the patient yesterday, her oxygen saturations were in the 70s.  Despite increasing her supplemental O2, they were not able to get any significant improvement in her oxygenation.  She was tested for COVID-19 on 11/8 and that came back positive as per the report (which I am not able to view).  EMS put her on 15 LPM O2 but was still hypoxic.  She was transported to [facility] ER for further evaluation.     Patient has some cough and reports dyspnea which is unchanged since yesterday.  No fever.  Denies headache, dizziness, chest pain, nausea, diarrhea, abdominal pain or urinary symptoms.      Labs at outside hospital:   VBG showed pH 7.40, PCO2 44 and PO2 75.  Procalcitonin was 0.11.  POC cardiac ultrasound showed no pericardial effusion.  Hyperdynamic LV.  Sonographic evidence of hypovolemia.  WBC 4, hemoglobin 11.5, hematocrit 34, platelet count 250, sodium 140,  chloride 1047, glucose 143, anion gap 5, bicarb 29, creatinine 0.6, potassium 4.3.  Lactic acid 1.1 CRP 62.86.  Reviewed COVID-19 result which was positive.  Troponin mildly elevated.  Fibrinogen 593, mildly elevated, APTT 30.5.  INR 1.1.  Albumin 3.7 total protein 7.9 total bilirubin 0.3 alkaline phosphatase 85.  D-dimer 416 (normal less than 229).  Ferritin 246 (normal less than 150).  Chest x-ray from outside hospital reads chronic upper lobe scarring and bilateral interstitial opacities.  COVID-19 PCR positive from yesterday 11/10 from [facility] ER.    1 g Rocephin, 500 mg azithromycin, 2.4L IV fluid, Tylenol, Motrin, duo nebs x3, Solu-Medrol 125 mg, magnesium 2 g IV was given.  Due to lack of bed at outside hospital, plan was to transfer, and patient was in ER since yesterday awaiting bed here.    Review of Systems     10 point Review of Systems attempted, was difficult to obtain history since patient is on BiPAP.  She denied all other symptoms other than mentioned above in HPI.      Past Medical History    I have reviewed this patient's medical history and updated it with pertinent information if needed.   -COPD/emphysema with chronic hypoxemic respiratory failure on 4 LPM NC O2  -Depression  -Chronic venous insufficiency  -Paroxysmal A. fib  -Hypothyroidism  -Oropharyngeal dysphagia  -Hypertension  -Chronic constipation      Past Surgical History   I have reviewed this patient's surgical history and updated it with pertinent information if needed.  No past surgical history on file.    Social History   I have reviewed this patient's social history and updated it with pertinent information if needed.  Social History     Tobacco Use     Smoking status: Not on file   Substance Use Topics     Alcohol use: Not on file     Drug use: Not on file       Family History     Reviewed with patient, not contributory to her presentation.    Prior to Admission Medications   None     Allergies   Not on File    Physical Exam   Vital  Signs:                    Weight: 0 lbs 0 oz    General: AAOx3, on Bipap but appears comfortable.  HEENT: PERRLA EOMI.    Lungs: Bilateral equal air entry. Diminished but clear to auscultation.  On BiPAP but appears to have normal work of breathing.  Patient is able to speak short sentences while on BiPAP.  CVS: S1S2 regular, no tachycardia or murmur.   Abdomen: Soft, distended/obese, nontender. BS heard.  MSK: 1+ leg edema.  Right upper extremity on splint.  Neuro: Alert awake, oriented to self place year.  PERRLA, EOMI, face is symmetrical.  Right upper extremity is weaker due to being in a splint otherwise strength symmetrical on dorsi and plantar flexion.  Skin: No rash.       Data   Data reviewed today: I reviewed all medications, new labs and imaging results over the last 24 hours. I personally reviewed Chest x-ray is above    No lab results found in last 7 days.  Labs from outside hospital reviewed as above.

## 2020-11-11 NOTE — PROGRESS NOTES
Patient placed on BiPAP 14/8 R 12 50%  Gel pad and mepilex in place.     Alarm volume 10.     RT will continue to follow.   Leann Snell, RT

## 2020-11-12 ENCOUNTER — APPOINTMENT (OUTPATIENT)
Dept: CARDIOLOGY | Facility: CLINIC | Age: 80
DRG: 177 | End: 2020-11-12
Attending: HOSPITALIST
Payer: COMMERCIAL

## 2020-11-12 ENCOUNTER — APPOINTMENT (OUTPATIENT)
Dept: GENERAL RADIOLOGY | Facility: CLINIC | Age: 80
DRG: 177 | End: 2020-11-12
Attending: HOSPITALIST
Payer: COMMERCIAL

## 2020-11-12 LAB
ALBUMIN SERPL-MCNC: 2.7 G/DL (ref 3.4–5)
ALP SERPL-CCNC: 64 U/L (ref 40–150)
ALT SERPL W P-5'-P-CCNC: 24 U/L (ref 0–50)
ANION GAP SERPL CALCULATED.3IONS-SCNC: 4 MMOL/L (ref 3–14)
AST SERPL W P-5'-P-CCNC: 36 U/L (ref 0–45)
BASE DEFICIT BLDA-SCNC: 0.7 MMOL/L
BASOPHILS # BLD AUTO: 0 10E9/L (ref 0–0.2)
BASOPHILS NFR BLD AUTO: 0.2 %
BILIRUB SERPL-MCNC: 0.2 MG/DL (ref 0.2–1.3)
BUN SERPL-MCNC: 23 MG/DL (ref 7–30)
CALCIUM SERPL-MCNC: 7.9 MG/DL (ref 8.5–10.1)
CHLORIDE SERPL-SCNC: 111 MMOL/L (ref 94–109)
CK SERPL-CCNC: 209 U/L (ref 30–225)
CO2 SERPL-SCNC: 27 MMOL/L (ref 20–32)
CREAT SERPL-MCNC: 0.57 MG/DL (ref 0.52–1.04)
CRP SERPL-MCNC: 26.2 MG/L (ref 0–8)
D DIMER PPP FEU-MCNC: 2.4 UG/ML FEU (ref 0–0.5)
DIFFERENTIAL METHOD BLD: ABNORMAL
EOSINOPHIL # BLD AUTO: 0 10E9/L (ref 0–0.7)
EOSINOPHIL NFR BLD AUTO: 0 %
ERYTHROCYTE [DISTWIDTH] IN BLOOD BY AUTOMATED COUNT: 14 % (ref 10–15)
FIBRINOGEN PPP-MCNC: 507 MG/DL (ref 200–420)
GFR SERPL CREATININE-BSD FRML MDRD: 89 ML/MIN/{1.73_M2}
GLUCOSE BLDC GLUCOMTR-MCNC: 107 MG/DL (ref 70–99)
GLUCOSE BLDC GLUCOMTR-MCNC: 114 MG/DL (ref 70–99)
GLUCOSE BLDC GLUCOMTR-MCNC: 131 MG/DL (ref 70–99)
GLUCOSE BLDC GLUCOMTR-MCNC: 133 MG/DL (ref 70–99)
GLUCOSE BLDC GLUCOMTR-MCNC: 140 MG/DL (ref 70–99)
GLUCOSE BLDC GLUCOMTR-MCNC: 154 MG/DL (ref 70–99)
GLUCOSE SERPL-MCNC: 128 MG/DL (ref 70–99)
HCO3 BLD-SCNC: 28 MMOL/L (ref 21–28)
HCT VFR BLD AUTO: 32.6 % (ref 35–47)
HGB BLD-MCNC: 9.9 G/DL (ref 11.7–15.7)
IL6 SERPL-MCNC: 13.8 PG/ML
IMM GRANULOCYTES # BLD: 0.1 10E9/L (ref 0–0.4)
IMM GRANULOCYTES NFR BLD: 0.8 %
INR PPP: 1.01 (ref 0.86–1.14)
LDH SERPL L TO P-CCNC: 369 U/L (ref 81–234)
LYMPHOCYTES # BLD AUTO: 0.8 10E9/L (ref 0.8–5.3)
LYMPHOCYTES NFR BLD AUTO: 8.7 %
MCH RBC QN AUTO: 30.1 PG (ref 26.5–33)
MCHC RBC AUTO-ENTMCNC: 30.4 G/DL (ref 31.5–36.5)
MCV RBC AUTO: 99 FL (ref 78–100)
MONOCYTES # BLD AUTO: 0.8 10E9/L (ref 0–1.3)
MONOCYTES NFR BLD AUTO: 8.8 %
NEUTROPHILS # BLD AUTO: 7.1 10E9/L (ref 1.6–8.3)
NEUTROPHILS NFR BLD AUTO: 81.5 %
NRBC # BLD AUTO: 0 10*3/UL
NRBC BLD AUTO-RTO: 0 /100
NT-PROBNP SERPL-MCNC: 4069 PG/ML (ref 0–1800)
O2/TOTAL GAS SETTING VFR VENT: ABNORMAL %
OXYHGB MFR BLD: 90 % (ref 92–100)
PCO2 BLD: 69 MM HG (ref 35–45)
PH BLD: 7.22 PH (ref 7.35–7.45)
PLATELET # BLD AUTO: 210 10E9/L (ref 150–450)
PO2 BLD: 71 MM HG (ref 80–105)
POTASSIUM SERPL-SCNC: 4.3 MMOL/L (ref 3.4–5.3)
PROCALCITONIN SERPL-MCNC: <0.05 NG/ML
PROT SERPL-MCNC: 6.9 G/DL (ref 6.8–8.8)
RBC # BLD AUTO: 3.29 10E12/L (ref 3.8–5.2)
RETICS # AUTO: 24.7 10E9/L (ref 25–95)
RETICS/RBC NFR AUTO: 0.8 % (ref 0.5–2)
SODIUM SERPL-SCNC: 142 MMOL/L (ref 133–144)
TROPONIN I SERPL-MCNC: 0.04 UG/L (ref 0–0.04)
TROPONIN I SERPL-MCNC: 0.78 UG/L (ref 0–0.04)
TROPONIN I SERPL-MCNC: 2 UG/L (ref 0–0.04)
WBC # BLD AUTO: 8.7 10E9/L (ref 4–11)

## 2020-11-12 PROCEDURE — 84484 ASSAY OF TROPONIN QUANT: CPT | Performed by: HOSPITALIST

## 2020-11-12 PROCEDURE — 71045 X-RAY EXAM CHEST 1 VIEW: CPT

## 2020-11-12 PROCEDURE — 250N000011 HC RX IP 250 OP 636: Performed by: HOSPITALIST

## 2020-11-12 PROCEDURE — G0463 HOSPITAL OUTPT CLINIC VISIT: HCPCS

## 2020-11-12 PROCEDURE — 250N000009 HC RX 250: Performed by: HOSPITALIST

## 2020-11-12 PROCEDURE — 94660 CPAP INITIATION&MGMT: CPT

## 2020-11-12 PROCEDURE — 93308 TTE F-UP OR LMTD: CPT | Mod: 26 | Performed by: INTERNAL MEDICINE

## 2020-11-12 PROCEDURE — 83880 ASSAY OF NATRIURETIC PEPTIDE: CPT | Performed by: HOSPITALIST

## 2020-11-12 PROCEDURE — 80053 COMPREHEN METABOLIC PANEL: CPT | Performed by: HOSPITALIST

## 2020-11-12 PROCEDURE — 84145 PROCALCITONIN (PCT): CPT | Performed by: HOSPITALIST

## 2020-11-12 PROCEDURE — 250N000009 HC RX 250: Performed by: NURSE PRACTITIONER

## 2020-11-12 PROCEDURE — 85384 FIBRINOGEN ACTIVITY: CPT | Performed by: HOSPITALIST

## 2020-11-12 PROCEDURE — 85379 FIBRIN DEGRADATION QUANT: CPT | Performed by: HOSPITALIST

## 2020-11-12 PROCEDURE — 120N000001 HC R&B MED SURG/OB

## 2020-11-12 PROCEDURE — 999N000157 HC STATISTIC RCP TIME EA 10 MIN

## 2020-11-12 PROCEDURE — 258N000003 HC RX IP 258 OP 636: Performed by: HOSPITALIST

## 2020-11-12 PROCEDURE — 250N000013 HC RX MED GY IP 250 OP 250 PS 637

## 2020-11-12 PROCEDURE — 255N000002 HC RX 255 OP 636: Performed by: HOSPITALIST

## 2020-11-12 PROCEDURE — 93325 DOPPLER ECHO COLOR FLOW MAPG: CPT | Mod: 26 | Performed by: INTERNAL MEDICINE

## 2020-11-12 PROCEDURE — 36415 COLL VENOUS BLD VENIPUNCTURE: CPT | Performed by: HOSPITALIST

## 2020-11-12 PROCEDURE — 83520 IMMUNOASSAY QUANT NOS NONAB: CPT | Performed by: HOSPITALIST

## 2020-11-12 PROCEDURE — 85610 PROTHROMBIN TIME: CPT | Performed by: HOSPITALIST

## 2020-11-12 PROCEDURE — 93005 ELECTROCARDIOGRAM TRACING: CPT

## 2020-11-12 PROCEDURE — 99233 SBSQ HOSP IP/OBS HIGH 50: CPT | Performed by: HOSPITALIST

## 2020-11-12 PROCEDURE — 82550 ASSAY OF CK (CPK): CPT | Performed by: HOSPITALIST

## 2020-11-12 PROCEDURE — 93321 DOPPLER ECHO F-UP/LMTD STD: CPT | Mod: 26 | Performed by: INTERNAL MEDICINE

## 2020-11-12 PROCEDURE — 999N000208 ECHOCARDIOGRAM LIMITED

## 2020-11-12 PROCEDURE — 120N000013 HC R&B IMCU

## 2020-11-12 PROCEDURE — 250N000012 HC RX MED GY IP 250 OP 636 PS 637: Performed by: HOSPITALIST

## 2020-11-12 PROCEDURE — 93010 ELECTROCARDIOGRAM REPORT: CPT | Performed by: INTERNAL MEDICINE

## 2020-11-12 PROCEDURE — 82805 BLOOD GASES W/O2 SATURATION: CPT | Performed by: HOSPITALIST

## 2020-11-12 PROCEDURE — 85025 COMPLETE CBC W/AUTO DIFF WBC: CPT | Performed by: HOSPITALIST

## 2020-11-12 PROCEDURE — 86140 C-REACTIVE PROTEIN: CPT | Performed by: HOSPITALIST

## 2020-11-12 PROCEDURE — 999N001017 HC STATISTIC GLUCOSE BY METER IP

## 2020-11-12 PROCEDURE — 36600 WITHDRAWAL OF ARTERIAL BLOOD: CPT

## 2020-11-12 PROCEDURE — 83615 LACTATE (LD) (LDH) ENZYME: CPT | Performed by: HOSPITALIST

## 2020-11-12 PROCEDURE — 85045 AUTOMATED RETICULOCYTE COUNT: CPT | Performed by: HOSPITALIST

## 2020-11-12 PROCEDURE — 250N000013 HC RX MED GY IP 250 OP 250 PS 637: Performed by: HOSPITALIST

## 2020-11-12 RX ORDER — PAROXETINE 10 MG/1
10 TABLET, FILM COATED ORAL EVERY MORNING
Status: DISCONTINUED | OUTPATIENT
Start: 2020-11-13 | End: 2020-11-25 | Stop reason: HOSPADM

## 2020-11-12 RX ORDER — ATORVASTATIN CALCIUM 40 MG/1
40 TABLET, FILM COATED ORAL AT BEDTIME
Status: DISCONTINUED | OUTPATIENT
Start: 2020-11-12 | End: 2020-11-25 | Stop reason: HOSPADM

## 2020-11-12 RX ORDER — SENNOSIDES 8.6 MG
1 TABLET ORAL 2 TIMES DAILY
Status: DISCONTINUED | OUTPATIENT
Start: 2020-11-12 | End: 2020-11-25 | Stop reason: HOSPADM

## 2020-11-12 RX ORDER — POLYETHYLENE GLYCOL 3350 17 G/17G
17 POWDER, FOR SOLUTION ORAL DAILY
Status: DISCONTINUED | OUTPATIENT
Start: 2020-11-12 | End: 2020-11-25 | Stop reason: HOSPADM

## 2020-11-12 RX ORDER — DEXAMETHASONE SODIUM PHOSPHATE 4 MG/ML
6 INJECTION, SOLUTION INTRA-ARTICULAR; INTRALESIONAL; INTRAMUSCULAR; INTRAVENOUS; SOFT TISSUE EVERY 24 HOURS
Status: DISCONTINUED | OUTPATIENT
Start: 2020-11-12 | End: 2020-11-19

## 2020-11-12 RX ORDER — METOPROLOL TARTRATE 1 MG/ML
5 INJECTION, SOLUTION INTRAVENOUS EVERY 5 MIN PRN
Status: DISCONTINUED | OUTPATIENT
Start: 2020-11-12 | End: 2020-11-21

## 2020-11-12 RX ORDER — LORAZEPAM 2 MG/ML
0.5 INJECTION INTRAMUSCULAR ONCE
Status: COMPLETED | OUTPATIENT
Start: 2020-11-12 | End: 2020-11-12

## 2020-11-12 RX ORDER — NITROGLYCERIN 0.4 MG/1
0.4 TABLET SUBLINGUAL EVERY 5 MIN PRN
Status: DISCONTINUED | OUTPATIENT
Start: 2020-11-12 | End: 2020-11-12

## 2020-11-12 RX ORDER — HALOPERIDOL 5 MG/ML
2 INJECTION INTRAMUSCULAR EVERY 6 HOURS PRN
Status: DISCONTINUED | OUTPATIENT
Start: 2020-11-12 | End: 2020-11-12 | Stop reason: CLARIF

## 2020-11-12 RX ORDER — FUROSEMIDE 10 MG/ML
20 INJECTION INTRAMUSCULAR; INTRAVENOUS ONCE
Status: COMPLETED | OUTPATIENT
Start: 2020-11-12 | End: 2020-11-12

## 2020-11-12 RX ORDER — ASPIRIN 81 MG/1
81 TABLET, CHEWABLE ORAL DAILY
Status: DISCONTINUED | OUTPATIENT
Start: 2020-11-12 | End: 2020-11-25 | Stop reason: HOSPADM

## 2020-11-12 RX ORDER — LEVOTHYROXINE SODIUM 50 UG/1
50 TABLET ORAL DAILY
Status: DISCONTINUED | OUTPATIENT
Start: 2020-11-12 | End: 2020-11-25 | Stop reason: HOSPADM

## 2020-11-12 RX ADMIN — METOPROLOL TARTRATE 2.5 MG: 5 INJECTION INTRAVENOUS at 17:39

## 2020-11-12 RX ADMIN — UMECLIDINIUM 1 PUFF: 62.5 AEROSOL, POWDER ORAL at 08:09

## 2020-11-12 RX ADMIN — ENOXAPARIN SODIUM 40 MG: 40 INJECTION SUBCUTANEOUS at 17:39

## 2020-11-12 RX ADMIN — METOPROLOL TARTRATE 2.5 MG: 5 INJECTION INTRAVENOUS at 12:32

## 2020-11-12 RX ADMIN — INSULIN ASPART 1 UNITS: 100 INJECTION, SOLUTION INTRAVENOUS; SUBCUTANEOUS at 23:19

## 2020-11-12 RX ADMIN — FAMOTIDINE 20 MG: 10 INJECTION INTRAVENOUS at 08:06

## 2020-11-12 RX ADMIN — FUROSEMIDE 20 MG: 10 INJECTION, SOLUTION INTRAVENOUS at 14:35

## 2020-11-12 RX ADMIN — LORAZEPAM 0.5 MG: 2 INJECTION INTRAMUSCULAR; INTRAVENOUS at 13:35

## 2020-11-12 RX ADMIN — INSULIN ASPART 1 UNITS: 100 INJECTION, SOLUTION INTRAVENOUS; SUBCUTANEOUS at 00:47

## 2020-11-12 RX ADMIN — FLUTICASONE FUROATE AND VILANTEROL TRIFENATATE 1 PUFF: 200; 25 POWDER RESPIRATORY (INHALATION) at 08:09

## 2020-11-12 RX ADMIN — REMDESIVIR 100 MG: 100 INJECTION, POWDER, LYOPHILIZED, FOR SOLUTION INTRAVENOUS at 21:33

## 2020-11-12 RX ADMIN — FAMOTIDINE 20 MG: 10 INJECTION INTRAVENOUS at 20:40

## 2020-11-12 RX ADMIN — METOPROLOL TARTRATE 5 MG: 5 INJECTION INTRAVENOUS at 22:12

## 2020-11-12 RX ADMIN — DEXAMETHASONE SODIUM PHOSPHATE 6 MG: 4 INJECTION, SOLUTION INTRAMUSCULAR; INTRAVENOUS at 17:41

## 2020-11-12 RX ADMIN — METOPROLOL TARTRATE 2.5 MG: 5 INJECTION INTRAVENOUS at 06:30

## 2020-11-12 RX ADMIN — HUMAN ALBUMIN MICROSPHERES AND PERFLUTREN 9 ML: 10; .22 INJECTION, SOLUTION INTRAVENOUS at 14:30

## 2020-11-12 RX ADMIN — METOPROLOL TARTRATE 5 MG: 5 INJECTION INTRAVENOUS at 23:14

## 2020-11-12 RX ADMIN — METOPROLOL TARTRATE 2.5 MG: 5 INJECTION INTRAVENOUS at 00:47

## 2020-11-12 ASSESSMENT — ACTIVITIES OF DAILY LIVING (ADL)
ADLS_ACUITY_SCORE: 19
ADLS_ACUITY_SCORE: 22

## 2020-11-12 NOTE — PHARMACY-ADMISSION MEDICATION HISTORY
Pharmacy Medication History  Admission medication history interview status for the 11/11/2020  admission is complete. See EPIC admission navigator for prior to admission medications       Medication history sources: MAR ([facility])  Location of interview: [address]  Medication history source reliability: Good  Adherence assessment: Good    Medication reconciliation completed by provider prior to medication history? No    Time spent in this activity: 20 minutes      Prior to Admission medications    Medication Sig Last Dose Taking? Auth Provider   acetaminophen (TYLENOL) 325 MG tablet Take 650 mg by mouth 3 times daily 11/10/2020 at 0800 Yes Unknown, Entered By History   acetaminophen (TYLENOL) 325 MG tablet Take 650 mg by mouth 3 times daily as needed for mild pain  Yes Unknown, Entered By History   alum & mag hydroxide-simethicone (MAALOX) 200-200-20 MG/5ML SUSP suspension Take 30 mLs by mouth 3 times daily as needed for heartburn  Yes Unknown, Entered By History   aspirin (ASA) 81 MG chewable tablet Take 81 mg by mouth daily 11/10/2020 at 0800 Yes Unknown, Entered By History   atorvastatin (LIPITOR) 40 MG tablet Take 40 mg by mouth At Bedtime 11/9/2020 at 2000 Yes Unknown, Entered By History   calcium carbonate-vitamin D (OSCAL W/D) 500-200 MG-UNIT tablet Take 1 tablet by mouth 2 times daily 11/10/2020 at 0800 Yes Unknown, Entered By History   Dextromethorphan-guaiFENesin  MG/5ML syrup Take 10 mLs by mouth every 4 hours as needed for cough  Yes Unknown, Entered By History   doxycycline hyclate (VIBRAMYCIN) 100 MG capsule Take 100 mg by mouth 2 times daily For 5 days starting on 11/10/20. For COPD exacerbation. 11/10/2020 at 0800 Yes Unknown, Entered By History   fluticasone-salmeterol (ADVAIR) 250-50 MCG/DOSE inhaler Inhale 1 puff into the lungs every 12 hours 11/10/2020 at 0800 Yes Unknown, Entered By History   hypromellose-dextran (HYPROMELLOSE-DEXTRAN 0.3-0.1%) 0.1-0.3 % ophthalmic  solution Place 1 drop Into the left eye 4 times daily 11/10/2020 at 0800 Yes Unknown, Entered By History   ipratropium-albuterol (COMBIVENT RESPIMAT)  MCG/ACT inhaler Inhale 1 puff into the lungs every 4 hours as needed for shortness of breath / dyspnea or wheezing (COPD)  Yes Unknown, Entered By History   levothyroxine (SYNTHROID/LEVOTHROID) 50 MCG tablet Take 50 mcg by mouth daily 11/10/2020 at 0700 Yes Unknown, Entered By History   metoprolol tartrate (LOPRESSOR) 25 MG tablet Take 25 mg by mouth 2 times daily 11/10/2020 at 0800 Yes Unknown, Entered By History   nitroGLYcerin (NITROSTAT) 0.4 MG sublingual tablet Place 0.4 mg under the tongue every 5 minutes as needed for chest pain For chest pain place 1 tablet under the tongue every 5 minutes for 3 doses. If symptoms persist 5 minutes after 1st dose call 911.  Yes Unknown, Entered By History   PARoxetine (PAXIL) 10 MG tablet Take 10 mg by mouth every morning  11/10/2020 at 0800 Yes Unknown, Entered By History   polyethylene glycol (MIRALAX) 17 g packet Take 1 packet by mouth daily 11/10/2020 at 0800 Yes Unknown, Entered By History   senna (SENOKOT) 8.6 MG tablet Take 1 tablet by mouth 2 times daily 11/10/2020 at 0800 Yes Unknown, Entered By History   sodium chloride (OCEAN) 0.65 % nasal spray Spray 2 sprays into both nostrils 3 times daily And q4h prn 11/10/2020 at 0800 Yes Unknown, Entered By History   umeclidinium (INCRUSE ELLIPTA) 62.5 MCG/INH inhaler Inhale 1 puff into the lungs daily 11/10/2020 at 0800 Yes Unknown, Entered By History

## 2020-11-12 NOTE — PROGRESS NOTES
[facility]  Hospitalist Progress Note   11/12/2020          Assessment and Plan:       Carolina Frances is a 79 year old female with medical history significant for COPD/emphysema with chronic hypoxemic respiratory failure on 4 LPM NC O2, depression, chronic venous insufficiency, paroxysmal A. fib, hypothyroidism, oropharyngeal dysphagia admitted on 11/11/2020 from outside hospital due to lack of beds for further management.    COVID 19 pneumonia  Acute hypoxic respiratory failure requiring noninvasive mechanical ventilation-multifactorial.  COPD/emphysema with chronic hypoxemic respiratory failure on 4 LPM NC O2  Possible obstructive sleep apnea.  COVID-19 on 11/8 and that came back positive as per the report (which I am not able to view). At nursing facility when staff went to check on her, O2 saturations were in 70s. EMS put her on 15 LPM O2 but was still hypoxic.  At [facility]  VBG showed pH 7.40, PCO2 44 and PO2 75.    Procalcitonin was 0.11.  WBC 4 lactic acid 1.1.  CRP 62.86  Fibrinogen 593 D-dimer 416  Ferritin 246     Chest x-ray from outside hospital- chronic upper lobe scarring and bilateral interstitial opacities.  COVID-19 PCR positive from 11/10 from [facility].  Patient progressively hypoxic needing BiPAP -continue IMC status.  Stat ABG-accordingly will adjust BiPAP settings.    RT to assist with BiPAP, will wean off as able to tolerate.  N.p.o. while on BiPAP.  Continue IV remdesivir and IV dexamethasone ( 10/11)  ProCalcitonin repeat less than 0.05,  hold off antibiotics at this time.  CT of the chest with contrast to evaluate for PE, pulmonary infiltrates.  Labs as per protocol for COVID-19.  Subcutaneous Lovenox for DVT prophylaxis.  Continue PTA Advair and Incruse Ellipta  Continue PTA Combivent as needed for shortness of breath wheezing.   Pulmonology consult requested as patient chronic COPD on supplemental oxygen.    Elevated troponin likely in the setting of hypoxia.   Troponin at  outside ED slightly elevated per report. POC cardiac ultrasound showed no pericardial effusion.  Hyperdynamic LV  Troponin this morning 0.036. Trend troponin.  Telemetry monitoring.  Echocardiogram for evaluation of heart function.  We will check proBNP and accordingly diuresis as needed.  20 mg IV Lasix x1 stat  Continue PTA aspirin, simvastatin.  Strict input output monitoring, daily weights.    Acute anemia.  Unclear baseline.  Hemoglobin at outside ED 11.5, dropped to 9.9 this morning  We will check proBNP, diuresis prn  Monitor hemoglobin levels.    Paroxysmal A. Fib   Hypertension, hyperlipidemia.  Changed PTA metoprolol to IV metoprolol with hold parameters.   Continue PTA aspirin when able to swallow.  Continue PTA Lipitor when able to swallow.    Hypothyroidism  Continue PTA levothyroxine when able to swallow.    Oropharyngeal dysphagia  Currently n.p.o. was on BiPAP.  Speech therapy evaluation prior to resuming feeds.  Gentle hydration with normal saline at 50 mL/h.    Depression  Continue PTA Paxil been able to swallow.    Chronic venous insufficiency  ACE wraps prn and limb elevation     Obesity with a BMI of 29.86.  Will need lifestyle modification with diet and exercise as able to.  Will need to be evaluated for sleep studies as outpatient.    Chronic constipation.  As needed bowel regimen.    Physical deconditioning from ongoing medical illness, obesity.  Patient resident of nursing facility.  PT ordered.      Diet: NPO for Medical/Clinical Reasons Except for: Meds    DVT Prophylaxis:  Lovenox SQ  Poon Catheter: not present  Code Status:   Full code     Discussed with patient, bedside RN.    Requested floor team to obtain outside records, access to care everywhere.  Have discussed with transfer team at [facility], requested floor RN to obtain Covid test results scanned into chart and follow-up with transfer team.    Time greater than 35 minutes out of which greater than 60% of the time was spent with  direct patient care.    Laila Camacho MD        Interval History:      Lying in bed.  Slightly agitated on BiPAP.  Mild use of respiratory muscles.  Unable to obtain review of systems while on BiPAP.         Physical Exam:        Physical Exam   Temp:  [97.6  F (36.4  C)-98.4  F (36.9  C)] 97.7  F (36.5  C)  Pulse:  [75-96] 96  Resp:  [18-23] 19  BP: (121-151)/(68-85) 151/74  FiO2 (%):  [50 %] 50 %  SpO2:  [90 %-96 %] 93 %    Intake/Output Summary (Last 24 hours) at 11/12/2020 1257  Last data filed at 11/12/2020 0400  Gross per 24 hour   Intake 600 ml   Output --   Net 600 ml       Admission Weight: 83.9 kg (185 lb)  Current Weight: 83.9 kg (185 lb)    PHYSICAL EXAM  GENERAL: Patient mildly agitated, on BiPAP.  HEART:Technically difficult auscultation given obesity, humidifier in room.  LUNGS: Decreased breath sounds, respirations labored. Technically difficult auscultation given obesity, humidifier in room.  NEURO: Moving all extremities.  EXTREMITIES: ++pedal edema.  SKIN: Warm, dry.  PSYCHIATRY agitated        Medications:          dexamethasone  6 mg Intravenous Q24H     enoxaparin ANTICOAGULANT  40 mg Subcutaneous Q24H     famotidine  20 mg Intravenous Q12H     fluticasone-vilanterol  1 puff Inhalation Daily     insulin aspart  1-6 Units Subcutaneous Q4H     LORazepam  0.5 mg Intravenous Once     metoprolol  2.5 mg Intravenous Q6H     remdesivir  100 mg Intravenous Q24H     sodium chloride (PF)  3 mL Intracatheter Q8H     sodium chloride (PF)  3 mL Intracatheter Q8H     umeclidinium  1 puff Inhalation Daily     acetaminophen, acetaminophen, albuterol, artificial tears ophthalmic solution, glucose **OR** dextrose **OR** glucagon, hydrALAZINE, labetalol, lidocaine 4%, lidocaine 4%, lidocaine (buffered or not buffered), lidocaine (buffered or not buffered), - MEDICATION INSTRUCTIONS -, nitroGLYcerin, - MEDICATION INSTRUCTIONS -, ondansetron **OR** ondansetron, - MEDICATION INSTRUCTIONS -, polyethylene glycol,  sodium chloride (PF), sodium chloride (PF)         Data:      All new lab and imaging data was reviewed.

## 2020-11-12 NOTE — PLAN OF CARE
PT: Consult received. Chart reviewed. Pt currently on continuous Bipap. Will defer PT today. RN in agreement.

## 2020-11-12 NOTE — PLAN OF CARE
Pt was transferred from [facility], had positive covid test form the 8th was reported form [facility], pt on continuous BIPAP, lungs diminished, O2 sats 94%, reported from [facility] that pt has baseline dementia, for this nurse pt is oriented x4, have not had pt out of bed at this time, denies pain, positioned for comfort, will continue to monitor.

## 2020-11-12 NOTE — PROGRESS NOTES
Focus:  Lt hand, skin on palm of hand  S:  Pt consulted for above focus. History, progress notes and orders reviewed. Discussed with Nursing. Pt is currently agitated and         restless. Lt contracted with macerated loose skin, is moist but no wounds present. Discussed options to clean well then place        Rolled wash cloth in hand to pad/abosrb or place piece of Optifoam in hand to pad/abosrb.          Orders placed in Epic        Pt is CoVid +. Will attempt to see in am.     Nusrat Shaw [facility] RN

## 2020-11-12 NOTE — PLAN OF CARE
AVSS on Bipap. Oriented to self but forgetful. LS dim. Diet NPO due to Bipap. Up with assist of 1. BS active and audible; passing gas. Turned q2hrs. INC of urine. Area of yellow sloughing in contracted left hand.

## 2020-11-13 LAB
ALBUMIN SERPL-MCNC: 2.9 G/DL (ref 3.4–5)
ALP SERPL-CCNC: 61 U/L (ref 40–150)
ALT SERPL W P-5'-P-CCNC: 24 U/L (ref 0–50)
ANION GAP SERPL CALCULATED.3IONS-SCNC: <1 MMOL/L (ref 3–14)
APTT PPP: 27 SEC (ref 22–37)
AST SERPL W P-5'-P-CCNC: 36 U/L (ref 0–45)
BASE EXCESS BLDV CALC-SCNC: 4.9 MMOL/L
BASOPHILS # BLD AUTO: 0 10E9/L (ref 0–0.2)
BASOPHILS NFR BLD AUTO: 0.3 %
BILIRUB SERPL-MCNC: 0.3 MG/DL (ref 0.2–1.3)
BUN SERPL-MCNC: 25 MG/DL (ref 7–30)
CALCIUM SERPL-MCNC: 7.8 MG/DL (ref 8.5–10.1)
CHLORIDE SERPL-SCNC: 110 MMOL/L (ref 94–109)
CO2 SERPL-SCNC: 34 MMOL/L (ref 20–32)
CREAT SERPL-MCNC: 0.58 MG/DL (ref 0.52–1.04)
CRP SERPL-MCNC: 34 MG/L (ref 0–8)
D DIMER PPP FEU-MCNC: 2.3 UG/ML FEU (ref 0–0.5)
DIFFERENTIAL METHOD BLD: ABNORMAL
EOSINOPHIL # BLD AUTO: 0 10E9/L (ref 0–0.7)
EOSINOPHIL NFR BLD AUTO: 0 %
ERYTHROCYTE [DISTWIDTH] IN BLOOD BY AUTOMATED COUNT: 14.1 % (ref 10–15)
ERYTHROCYTE [DISTWIDTH] IN BLOOD BY AUTOMATED COUNT: 14.1 % (ref 10–15)
FIBRINOGEN PPP-MCNC: 474 MG/DL (ref 200–420)
GFR SERPL CREATININE-BSD FRML MDRD: 88 ML/MIN/{1.73_M2}
GLUCOSE BLDC GLUCOMTR-MCNC: 107 MG/DL (ref 70–99)
GLUCOSE BLDC GLUCOMTR-MCNC: 120 MG/DL (ref 70–99)
GLUCOSE BLDC GLUCOMTR-MCNC: 124 MG/DL (ref 70–99)
GLUCOSE BLDC GLUCOMTR-MCNC: 129 MG/DL (ref 70–99)
GLUCOSE BLDC GLUCOMTR-MCNC: 146 MG/DL (ref 70–99)
GLUCOSE BLDC GLUCOMTR-MCNC: 167 MG/DL (ref 70–99)
GLUCOSE SERPL-MCNC: 125 MG/DL (ref 70–99)
HCO3 BLDV-SCNC: 32 MMOL/L (ref 21–28)
HCT VFR BLD AUTO: 32.4 % (ref 35–47)
HCT VFR BLD AUTO: 33.9 % (ref 35–47)
HGB BLD-MCNC: 10.4 G/DL (ref 11.7–15.7)
HGB BLD-MCNC: 9.6 G/DL (ref 11.7–15.7)
IMM GRANULOCYTES # BLD: 0.1 10E9/L (ref 0–0.4)
IMM GRANULOCYTES NFR BLD: 1.1 %
INR PPP: 1.11 (ref 0.86–1.14)
IRON SATN MFR SERPL: 11 % (ref 15–46)
IRON SERPL-MCNC: 25 UG/DL (ref 35–180)
LACTATE BLD-SCNC: 1.1 MMOL/L (ref 0.7–2)
LDH SERPL L TO P-CCNC: 353 U/L (ref 81–234)
LYMPHOCYTES # BLD AUTO: 1 10E9/L (ref 0.8–5.3)
LYMPHOCYTES NFR BLD AUTO: 13.4 %
MCH RBC QN AUTO: 29.5 PG (ref 26.5–33)
MCH RBC QN AUTO: 30.6 PG (ref 26.5–33)
MCHC RBC AUTO-ENTMCNC: 29.6 G/DL (ref 31.5–36.5)
MCHC RBC AUTO-ENTMCNC: 30.7 G/DL (ref 31.5–36.5)
MCV RBC AUTO: 100 FL (ref 78–100)
MCV RBC AUTO: 100 FL (ref 78–100)
MONOCYTES # BLD AUTO: 0.7 10E9/L (ref 0–1.3)
MONOCYTES NFR BLD AUTO: 9.3 %
NEUTROPHILS # BLD AUTO: 5.6 10E9/L (ref 1.6–8.3)
NEUTROPHILS NFR BLD AUTO: 75.9 %
NRBC # BLD AUTO: 0 10*3/UL
NRBC BLD AUTO-RTO: 0 /100
NT-PROBNP SERPL-MCNC: ABNORMAL PG/ML (ref 0–1800)
O2/TOTAL GAS SETTING VFR VENT: ABNORMAL %
OXYHGB MFR BLDV: 76 %
PCO2 BLDV: 58 MM HG (ref 40–50)
PH BLDV: 7.35 PH (ref 7.32–7.43)
PLATELET # BLD AUTO: 217 10E9/L (ref 150–450)
PLATELET # BLD AUTO: 235 10E9/L (ref 150–450)
PO2 BLDV: 44 MM HG (ref 25–47)
POTASSIUM SERPL-SCNC: 3.9 MMOL/L (ref 3.4–5.3)
PROT SERPL-MCNC: 7 G/DL (ref 6.8–8.8)
RBC # BLD AUTO: 3.25 10E12/L (ref 3.8–5.2)
RBC # BLD AUTO: 3.4 10E12/L (ref 3.8–5.2)
RETICS # AUTO: 28.6 10E9/L (ref 25–95)
RETICS/RBC NFR AUTO: 0.8 % (ref 0.5–2)
SODIUM SERPL-SCNC: 143 MMOL/L (ref 133–144)
TIBC SERPL-MCNC: 227 UG/DL (ref 240–430)
TROPONIN I SERPL-MCNC: 1 UG/L (ref 0–0.04)
TROPONIN I SERPL-MCNC: 1.35 UG/L (ref 0–0.04)
UFH PPP CHRO-ACNC: >1.1 IU/ML
WBC # BLD AUTO: 5.7 10E9/L (ref 4–11)
WBC # BLD AUTO: 7.4 10E9/L (ref 4–11)

## 2020-11-13 PROCEDURE — 83615 LACTATE (LD) (LDH) ENZYME: CPT | Performed by: HOSPITALIST

## 2020-11-13 PROCEDURE — 83880 ASSAY OF NATRIURETIC PEPTIDE: CPT | Performed by: HOSPITALIST

## 2020-11-13 PROCEDURE — 250N000009 HC RX 250: Performed by: HOSPITALIST

## 2020-11-13 PROCEDURE — 36415 COLL VENOUS BLD VENIPUNCTURE: CPT | Performed by: HOSPITALIST

## 2020-11-13 PROCEDURE — 80053 COMPREHEN METABOLIC PANEL: CPT | Performed by: HOSPITALIST

## 2020-11-13 PROCEDURE — 85379 FIBRIN DEGRADATION QUANT: CPT | Performed by: HOSPITALIST

## 2020-11-13 PROCEDURE — 250N000013 HC RX MED GY IP 250 OP 250 PS 637: Performed by: HOSPITALIST

## 2020-11-13 PROCEDURE — 84484 ASSAY OF TROPONIN QUANT: CPT | Performed by: HOSPITALIST

## 2020-11-13 PROCEDURE — 99233 SBSQ HOSP IP/OBS HIGH 50: CPT | Performed by: HOSPITALIST

## 2020-11-13 PROCEDURE — 258N000003 HC RX IP 258 OP 636: Performed by: HOSPITALIST

## 2020-11-13 PROCEDURE — 85520 HEPARIN ASSAY: CPT | Performed by: HOSPITALIST

## 2020-11-13 PROCEDURE — 250N000011 HC RX IP 250 OP 636: Performed by: HOSPITALIST

## 2020-11-13 PROCEDURE — 85045 AUTOMATED RETICULOCYTE COUNT: CPT | Performed by: HOSPITALIST

## 2020-11-13 PROCEDURE — 83550 IRON BINDING TEST: CPT | Performed by: HOSPITALIST

## 2020-11-13 PROCEDURE — 86140 C-REACTIVE PROTEIN: CPT | Performed by: HOSPITALIST

## 2020-11-13 PROCEDURE — 94660 CPAP INITIATION&MGMT: CPT

## 2020-11-13 PROCEDURE — 85730 THROMBOPLASTIN TIME PARTIAL: CPT | Performed by: HOSPITALIST

## 2020-11-13 PROCEDURE — 82805 BLOOD GASES W/O2 SATURATION: CPT | Performed by: HOSPITALIST

## 2020-11-13 PROCEDURE — 99223 1ST HOSP IP/OBS HIGH 75: CPT | Performed by: INTERNAL MEDICINE

## 2020-11-13 PROCEDURE — 85384 FIBRINOGEN ACTIVITY: CPT | Performed by: HOSPITALIST

## 2020-11-13 PROCEDURE — 999N001017 HC STATISTIC GLUCOSE BY METER IP

## 2020-11-13 PROCEDURE — 83605 ASSAY OF LACTIC ACID: CPT | Performed by: HOSPITALIST

## 2020-11-13 PROCEDURE — 999N000157 HC STATISTIC RCP TIME EA 10 MIN

## 2020-11-13 PROCEDURE — 85610 PROTHROMBIN TIME: CPT | Performed by: HOSPITALIST

## 2020-11-13 PROCEDURE — 120N000001 HC R&B MED SURG/OB

## 2020-11-13 PROCEDURE — 85027 COMPLETE CBC AUTOMATED: CPT | Performed by: HOSPITALIST

## 2020-11-13 PROCEDURE — 85025 COMPLETE CBC W/AUTO DIFF WBC: CPT | Performed by: HOSPITALIST

## 2020-11-13 PROCEDURE — 120N000013 HC R&B IMCU

## 2020-11-13 PROCEDURE — 83540 ASSAY OF IRON: CPT | Performed by: HOSPITALIST

## 2020-11-13 RX ORDER — FUROSEMIDE 10 MG/ML
20 INJECTION INTRAMUSCULAR; INTRAVENOUS ONCE
Status: COMPLETED | OUTPATIENT
Start: 2020-11-13 | End: 2020-11-13

## 2020-11-13 RX ORDER — IOPAMIDOL 755 MG/ML
78 INJECTION, SOLUTION INTRAVASCULAR ONCE
Status: DISCONTINUED | OUTPATIENT
Start: 2020-11-13 | End: 2020-11-25 | Stop reason: HOSPADM

## 2020-11-13 RX ORDER — HEPARIN SODIUM 10000 [USP'U]/100ML
0-5000 INJECTION, SOLUTION INTRAVENOUS CONTINUOUS
Status: DISCONTINUED | OUTPATIENT
Start: 2020-11-13 | End: 2020-11-18

## 2020-11-13 RX ADMIN — DEXTRAN 70, GLYCERIN, HYPROMELLOSE 1 DROP: 1; 2; 3 SOLUTION/ DROPS OPHTHALMIC at 09:30

## 2020-11-13 RX ADMIN — FLUTICASONE FUROATE AND VILANTEROL TRIFENATATE 1 PUFF: 200; 25 POWDER RESPIRATORY (INHALATION) at 09:31

## 2020-11-13 RX ADMIN — METOPROLOL TARTRATE 2.5 MG: 5 INJECTION INTRAVENOUS at 23:40

## 2020-11-13 RX ADMIN — DEXTRAN 70, GLYCERIN, HYPROMELLOSE 1 DROP: 1; 2; 3 SOLUTION/ DROPS OPHTHALMIC at 12:03

## 2020-11-13 RX ADMIN — INSULIN ASPART 1 UNITS: 100 INJECTION, SOLUTION INTRAVENOUS; SUBCUTANEOUS at 23:20

## 2020-11-13 RX ADMIN — DEXTRAN 70, GLYCERIN, HYPROMELLOSE 1 DROP: 1; 2; 3 SOLUTION/ DROPS OPHTHALMIC at 21:56

## 2020-11-13 RX ADMIN — DEXAMETHASONE SODIUM PHOSPHATE 6 MG: 4 INJECTION, SOLUTION INTRAMUSCULAR; INTRAVENOUS at 18:00

## 2020-11-13 RX ADMIN — Medication 2 SPRAY: at 09:33

## 2020-11-13 RX ADMIN — FAMOTIDINE 20 MG: 10 INJECTION INTRAVENOUS at 07:55

## 2020-11-13 RX ADMIN — FAMOTIDINE 20 MG: 10 INJECTION INTRAVENOUS at 20:50

## 2020-11-13 RX ADMIN — METOPROLOL TARTRATE 2.5 MG: 5 INJECTION INTRAVENOUS at 11:58

## 2020-11-13 RX ADMIN — REMDESIVIR 100 MG: 100 INJECTION, POWDER, LYOPHILIZED, FOR SOLUTION INTRAVENOUS at 21:57

## 2020-11-13 RX ADMIN — HEPARIN SODIUM 1000 UNITS/HR: 10000 INJECTION, SOLUTION INTRAVENOUS at 12:24

## 2020-11-13 RX ADMIN — METOPROLOL TARTRATE 2.5 MG: 5 INJECTION INTRAVENOUS at 06:47

## 2020-11-13 RX ADMIN — METOPROLOL TARTRATE 2.5 MG: 5 INJECTION INTRAVENOUS at 17:55

## 2020-11-13 RX ADMIN — DEXTRAN 70, GLYCERIN, HYPROMELLOSE 1 DROP: 1; 2; 3 SOLUTION/ DROPS OPHTHALMIC at 17:59

## 2020-11-13 RX ADMIN — FUROSEMIDE 20 MG: 10 INJECTION, SOLUTION INTRAVENOUS at 12:28

## 2020-11-13 RX ADMIN — UMECLIDINIUM 1 PUFF: 62.5 AEROSOL, POWDER ORAL at 09:31

## 2020-11-13 RX ADMIN — SODIUM CHLORIDE: 9 INJECTION, SOLUTION INTRAVENOUS at 07:54

## 2020-11-13 RX ADMIN — Medication 2 SPRAY: at 21:55

## 2020-11-13 ASSESSMENT — ACTIVITIES OF DAILY LIVING (ADL)
ADLS_ACUITY_SCORE: 22
ADLS_ACUITY_SCORE: 22
ADLS_ACUITY_SCORE: 26
ADLS_ACUITY_SCORE: 28
ADLS_ACUITY_SCORE: 28
ADLS_ACUITY_SCORE: 26

## 2020-11-13 NOTE — PROVIDER NOTIFICATION
MD Notification    Notified Person: MD    Notified Person Name: Doug     Notification Date/Time: 11/13/20 12:49 PM    Notification Interaction: online paging    Purpose of Notification: FYI morning trop 1.353, noon trop 0.997    Orders Received:    Comments:

## 2020-11-13 NOTE — CODE/RAPID RESPONSE
Brief house NP note:    RRT paged for atrial fibrillation with RVR. Patient has hx of RVR, currently out of it with a HR around 100 bpm, normotensive.     INTERVENTIONS:  -- 12 lead, non-ischemic   -- 5 mg IV metoprolol x 1    Please page with additional concerns,     GERRY Mckinney, CNP  Hospitalist - House [facility]  Text Page  [phone])

## 2020-11-13 NOTE — CONSULTS
[facility]    Cardiology Consultation     Date of Admission:  11/11/2020    Assessment & Plan   Carolina Frances is a 79 year old female with past medical history significant for COPD on 4 L home oxygen, depression, paroxysmal atrial fibrillation, hyperlipidemia, hypothyroidism, and chronic venous insufficiency, admitted on 11/11/2020 from outside hospital with COVID-19 pneumonia, acute hypoxic respiratory failure, and troponin elevation with newly recognized LV systolic dysfunction.    1.  Acute on chronic hypoxic respiratory failure due to COVID-19 pneumonia  2.  NSTEMI, likely type II MI (demand ischemia)  3.  Paroxysmal atrial fibrillation, chronic, [id number] = at least 3  4.  Acute, newly recognized, moderate biventricular systolic dysfunction  5.  COPD on 4 L home oxygen  6.  Hyperlipidemia  7.  Hypothyroidism    Patient is chest pain-free without acute ischemic changes on ECG.  In light of her severe acute illness, her troponin elevation is most likely a type II MI (demand ischemia).  She does not require anticoagulation for this, though unless she has bleeding issues she should be on it regardless for her atrial fibrillation.  She also does not require P2 Y 12 inhibitor.  She has been relatively well rate controlled currently on low-dose IV metoprolol, which can be transitioned to oral once her breathing improves.    Regarding her new biventricular dysfunction, our assessment of this is severely limited by her poor acoustic windows, which may improve somewhat once BiPAP is off and she can be better positioned.  Would recommend repeat echo at that time.  We will decide if she needs GDMT, and what level of ischemic evaluation is necessary based on the results of this.  Hold off on GDMT for now in light of her acute illness.      -Anticoagulation not required for type II MI, but continue for stroke prevention with atrial fibrillation  -Continue home aspirin 81 and  atorvastatin  -Repeat TTE once clinical status improves and patient can be appropriately positioned for optimal acoustic windows  -Decide on GDMT and necessity of ischemic eval based on results of above TTE  -Continue management of acute on chronic hypoxic respiratory failure and COVID-19 pneumonia as you are doing      Gato Louise MD    Code Status    Full Code    Reason for Consult   Reason for consult: NSTEMI    Primary Care Physician   Physician No Ref-Primary    Chief Complaint   Shortness of breath    History is obtained from the patient, though limited by BiPAP    History of Present Illness   Carolina Frances is a 79 year old female with past medical history significant for COPD on 4 L home oxygen, depression, paroxysmal atrial fibrillation, hypothyroidism, and chronic venous insufficiency, admitted on 11/11/2020 from outside hospital with COVID-19 pneumonia, acute hypoxic respiratory failure, and troponin elevation with newly recognized LV systolic dysfunction.  History is limited due to patient dyspnea and BiPAP mask in place.  In discussion with the nurse at her bedside, she has been doing a bit better this morning.  Patient denies chest pain currently or at any time in the recent past.  She gets her care at [facility], but we do not yet have these records.  She does not know of any heart problems other than atrial fibrillation.    Over the past 24 hours on telemetry, she has primarily been in normal sinus rhythm, with occasional brief atrial fibrillation episodes with spikes in heart rate into the 120s to 150s.  Blood pressures have been stable.  On labs, renal function has been normal.  Troponin peaked at approximately 2.0 and is now on its way down.  CBC shows some mild anemia but is otherwise unremarkable.  D-dimer is moderately elevated at 2.3.  N-terminal proBNP is markedly elevated at 12,800.  Chest x-ray shows coarse diffuse infiltrates, likely pneumonia with possible  contribution from pulmonary edema.  A CT PE protocol has been ordered but not yet performed.  An EKG showed sinus rhythm with PACs and septal Q waves.  An echo was performed with incredibly poor windows.  On my review, I would agree with the report that there is probably moderate biventricular systolic dysfunction, but exact LVEF or regional wall motion is impossible to assess.  The valves are not well visualized there is at least a bit of mitral regurgitation.    Past Medical History   See HPI    Past Surgical History   See HPI    Prior to Admission Medications   Prior to Admission Medications   Prescriptions Last Dose Informant Patient Reported? Taking?   Dextromethorphan-guaiFENesin  MG/5ML syrup  Nursing Home Yes Yes   Sig: Take 10 mLs by mouth every 4 hours as needed for cough   PARoxetine (PAXIL) 10 MG tablet 11/10/2020 at [facility] Yes Yes   Sig: Take 10 mg by mouth every morning    acetaminophen (TYLENOL) 325 MG tablet 11/10/2020 at [facility] Yes Yes   Sig: Take 650 mg by mouth 3 times daily   acetaminophen (TYLENOL) 325 MG tablet  Nursing Home Yes Yes   Sig: Take 650 mg by mouth 3 times daily as needed for mild pain   alum & mag hydroxide-simethicone (MAALOX) 200-200-20 MG/5ML SUSP suspension  Nursing Home Yes Yes   Sig: Take 30 mLs by mouth 3 times daily as needed for heartburn   aspirin (ASA) 81 MG chewable tablet 11/10/2020 at 0[facility] Yes Yes   Sig: Take 81 mg by mouth daily   atorvastatin (LIPITOR) 40 MG tablet 11/9/2020 at [facility] Yes Yes   Sig: Take 40 mg by mouth At Bedtime   calcium carbonate-vitamin D (OSCAL W/D) 500-200 MG-UNIT tablet 11/10/2020 at 0[facility] Yes Yes   Sig: Take 1 tablet by mouth 2 times daily   doxycycline hyclate (VIBRAMYCIN) 100 MG capsule 11/10/2020 at 0[facility] Yes Yes   Sig: Take 100 mg by mouth 2 times daily For 5 days starting on 11/10/20. For COPD exacerbation.   fluticasone-salmeterol (ADVAIR) 250-50 MCG/DOSE  inhaler 11/10/2020 at [address] [place] [address] Yes Yes   Sig: Inhale 1 puff into the lungs every 12 hours   hypromellose-dextran (HYPROMELLOSE-DEXTRAN 0.3-0.1%) 0.1-0.3 % ophthalmic solution 11/10/2020 at [address] [place] [address] Yes Yes   Sig: Place 1 drop Into the left eye 4 times daily   ipratropium-albuterol (COMBIVENT RESPIMAT)  MCG/ACT inhaler  [facility] Yes Yes   Sig: Inhale 1 puff into the lungs every 4 hours as needed for shortness of breath / dyspnea or wheezing (COPD)   levothyroxine (SYNTHROID/LEVOTHROID) 50 MCG tablet 11/10/2020 at [address] [place] [address] Yes Yes   Sig: Take 50 mcg by mouth daily   metoprolol tartrate (LOPRESSOR) 25 MG tablet 11/10/2020 at [address] [place] [address] Yes Yes   Sig: Take 25 mg by mouth 2 times daily   nitroGLYcerin (NITROSTAT) 0.4 MG sublingual tablet  [facility] Yes Yes   Sig: Place 0.4 mg under the tongue every 5 minutes as needed for chest pain For chest pain place 1 tablet under the tongue every 5 minutes for 3 doses. If symptoms persist 5 minutes after 1st dose call 911.   polyethylene glycol (MIRALAX) 17 g packet 11/10/2020 at [address] [place] [address] Yes Yes   Sig: Take 1 packet by mouth daily   senna (SENOKOT) 8.6 MG tablet 11/10/2020 at [address] [place] [address] Yes Yes   Sig: Take 1 tablet by mouth 2 times daily   sodium chloride (OCEAN) 0.65 % nasal spray 11/10/2020 at [address] [place] [address] Yes Yes   Sig: Spray 2 sprays into both nostrils 3 times daily And q4h prn   umeclidinium (INCRUSE ELLIPTA) 62.5 MCG/INH inhaler 11/10/2020 at [address] [place] [address] Yes Yes   Sig: Inhale 1 puff into the lungs daily      Facility-Administered Medications: None     Allergies   No Known Allergies    Social History   Unable to assess due to dyspnea and BiPAP    Family History   Unable to assess due to dyspnea and BiPAP    Review of Systems   A 10 point Review of Systems was unable to be performed due to dyspnea on BiPAP    Physical Exam   Temp: 97.8  F (36.6  C) Temp src: Axillary BP: (!) 135/92 Pulse: 85   Resp: 19  SpO2: (P) 93 % O2 Device: (P) High Flow Nasal Cannula (HFNC) Oxygen Delivery: (P) 40 LPM  Vital Signs with Ranges  Temp:  [97.8  F (36.6  C)-98.7  F (37.1  C)] 97.8  F (36.6  C)  Pulse:  [] 85  Resp:  [13-26] 19  BP: ()/(61-92) 135/92  FiO2 (%):  [60 %-80 %] (P) 80 %  SpO2:  [93 %-98 %] (P) 93 %  185 lbs 0 oz    Constitutional:  Awake, alert, moderate distress on BiPAP  Eyes: EOMI, sclera non-icteric  ENT: Trachea midline  Respiratory: Increased respiratory effort, BiPAP in place, diffuse crackles  Cardiovascular: RRR, no m/r/g.  JVP difficult to assess due to BiPAP mask and difficulties with positioning.  1+ bilateral LE edema.  Normal carotid upstrokes, no carotid bruits.  GI:  Nondistended, nontender.  Skin: Dry, no significant rash on exposed skin  Musculoskeletal:  Strength 5/5 in upper and lower extremities  Psychiatric: Appropriate affect      Data   Labs  I have personally reviewed the pertinent cardiac labs.    Most Recent 3 CBC's:  Recent Labs   Lab Test 11/13/20  1153 11/13/20 0653 11/12/20  0630   WBC 5.7 7.4 8.7   HGB 9.6* 10.4* 9.9*    100 99    235 210       Most Recent 3 BMP's:  Recent Labs   Lab Test 11/13/20  0653 11/12/20 0630 11/11/20  1910    142 141   POTASSIUM 3.9 4.3 4.6   CHLORIDE 110* 111* 109   CO2 34* 27 28   BUN 25 23 22   CR 0.58 0.57 0.55   ANIONGAP <1* 4 4   YAMINI 7.8* 7.9* 7.9*   * 128* 146*       Most Recent 2 LFT's:  Recent Labs   Lab Test 11/13/20  0653 11/12/20  0630   AST 36 36   ALT 24 24   ALKPHOS 61 64   BILITOTAL 0.3 0.2       Most Recent 3 INR's:  Recent Labs   Lab Test 11/13/20 0653 11/12/20  0630   INR 1.11 1.01       Most Recent 3 Troponin's:  Recent Labs   Lab Test 11/13/20  1153 11/13/20  0653 11/12/20  1940   TROPI 0.997* 1.353* 2.001*       Most Recent Cholesterol Panel:No lab results found.    Most Recent Hemoglobin A1c:  Recent Labs   Lab Test 11/11/20  1711   A1C 6.2*       Imaging:        Gato Louise,  MD  Interventional Cardiology  November 13, 2020

## 2020-11-13 NOTE — CONSULTS
Pulmonary Medicine Consultation      Date of Admission: 11/11/2020  Primary Attending:  Mikaela Garay MD  Consulting Physician: Jaime Paulson MD    History:       Carolina Frances is a 79 year old female with medical history significant for COPD/emphysema with chronic hypoxemic respiratory failure on 4 LPM NC O2, depression, chronic venous insufficiency, paroxysmal A. fib, hypothyroidism, oropharyngeal dysphagia, hypertension, chronic constipation.  She has been transferred to [facility] due to lack of inpatient bed at outside hospital and is being admitted on 11/11/2020 for further management.      Review of Systems - A 10-system ROS is negative except for items mentioned above and in HPI.       Prior medical history:  No past medical history on file.    No past surgical history on file.    Patient Active Problem List   Diagnosis     Respiratory failure (H)       Social History     Social History     Socioeconomic History     Marital status: Not on file     Spouse name: Not on file     Number of children: Not on file     Years of education: Not on file     Highest education level: Not on file   Occupational History     Not on file   Social Needs     Financial resource strain: Not on file     Food insecurity     Worry: Not on file     Inability: Not on file     Transportation needs     Medical: Not on file     Non-medical: Not on file   Tobacco Use     Smoking status: Not on file   Substance and Sexual Activity     Alcohol use: Not on file     Drug use: Not on file     Sexual activity: Not on file   Lifestyle     Physical activity     Days per week: Not on file     Minutes per session: Not on file     Stress: Not on file   Relationships     Social connections     Talks on phone: Not on file     Gets together: Not on file     Attends Gnosticist service: Not on file     Active member of club or organization: Not on file     Attends meetings of clubs or organizations: Not on file     Relationship status: Not on file      Intimate partner violence     Fear of current or ex partner: Not on file     Emotionally abused: Not on file     Physically abused: Not on file     Forced sexual activity: Not on file   Other Topics Concern     Not on file   Social History Narrative     Not on file         Family History  No family history on file.      Medications  No current outpatient medications on file.     Current Facility-Administered Medications Ordered in Epic   Medication Dose Route Frequency Last Rate Last Dose     acetaminophen (TYLENOL) Suppository 650 mg  650 mg Rectal Q4H PRN         acetaminophen (TYLENOL) tablet 650 mg  650 mg Oral Q4H PRN         albuterol (PROAIR HFA/PROVENTIL HFA/VENTOLIN HFA) 108 (90 Base) MCG/ACT inhaler 2 puff  2 puff Inhalation 4x Daily PRN         aspirin (ASA) chewable tablet 81 mg  81 mg Oral Daily         atorvastatin (LIPITOR) tablet 40 mg  40 mg Oral At Bedtime         carboxymethylcellulose PF (REFRESH PLUS) 0.5 % ophthalmic solution 1 drop  1 drop Both Eyes Q1H PRN         dexamethasone (DECADRON) injection 6 mg  6 mg Intravenous Q24H   6 mg at 11/12/20 1741     glucose gel 15-30 g  15-30 g Oral Q15 Min PRN        Or     dextrose 50 % injection 25-50 mL  25-50 mL Intravenous Q15 Min PRN        Or     glucagon injection 1 mg  1 mg Subcutaneous Q15 Min PRN         enoxaparin ANTICOAGULANT (LOVENOX) injection 40 mg  40 mg Subcutaneous Q24H   40 mg at 11/12/20 1739     famotidine (PEPCID) injection 20 mg  20 mg Intravenous Q12H   20 mg at 11/13/20 0755     fluticasone-vilanterol (BREO ELLIPTA) 200-25 MCG/INH inhaler 1 puff  1 puff Inhalation Daily   1 puff at 11/13/20 0931     hydrALAZINE (APRESOLINE) injection 10 mg  10 mg Intravenous Q4H PRN         hypromellose-dextran (ARTIFICAL TEARS) 0.1-0.3 % ophthalmic solution 1 drop  1 drop Left Eye 4x Daily   1 drop at 11/13/20 0930     insulin aspart (NovoLOG) injection (RAPID ACTING)  1-6 Units Subcutaneous Q4H   1 Units at 11/12/20 5616      ipratropium-albuterol (COMBIVENT RESPIMAT) inhaler 1 puff  1 puff Inhalation Q4H PRN         labetalol (NORMODYNE/TRANDATE) injection 10 mg  10 mg Intravenous Q2H PRN         levothyroxine (SYNTHROID/LEVOTHROID) tablet 50 mcg  50 mcg Oral Daily         lidocaine (LMX4) cream   Topical Q1H PRN         lidocaine (LMX4) cream   Topical Q1H PRN         lidocaine 1 % 0.1-1 mL  0.1-1 mL Other Q1H PRN         lidocaine 1 % 0.1-1 mL  0.1-1 mL Other Q1H PRN         Medication instructions: Do NOT use nebulized medications   Does not apply Continuous PRN         metoprolol (LOPRESSOR) injection 2.5 mg  2.5 mg Intravenous Q6H   2.5 mg at 11/13/20 0647     metoprolol (LOPRESSOR) injection 5 mg  5 mg Intravenous Q5 Min PRN   5 mg at 11/12/20 2314     nitroGLYcerin (NITROSTAT) sublingual tablet 0.4 mg  0.4 mg Sublingual Q5 Min PRN         No lozenges or gum should be given while patient on BIPAP/AVAPS/AVAPS AE   Does not apply Continuous PRN         ondansetron (ZOFRAN-ODT) ODT tab 4 mg  4 mg Oral Q6H PRN        Or     ondansetron (ZOFRAN) injection 4 mg  4 mg Intravenous Q6H PRN         PARoxetine (PAXIL) tablet 10 mg  10 mg Oral QAM         Patient may continue current oral medications   Does not apply Continuous PRN         polyethylene glycol (MIRALAX) Packet 17 g  17 g Oral Daily         polyethylene glycol (MIRALAX) Packet 17 g  17 g Oral Daily PRN         remdesivir 100 mg in sodium chloride 0.9 % 250 mL intermittent infusion  100 mg Intravenous Q24H 125 mL/hr at 11/12/20 2133 100 mg at 11/12/20 2133     sennosides (SENOKOT) tablet 1 tablet  1 tablet Oral BID         sodium chloride (OCEAN) 0.65 % nasal spray 2 spray  2 spray Both Nostrils TID   2 spray at 11/13/20 0933     sodium chloride (PF) 0.9% PF flush 10 mL  10 mL Intravenous Once         sodium chloride (PF) 0.9% PF flush 3 mL  3 mL Intracatheter q1 min prn         sodium chloride (PF) 0.9% PF flush 3 mL  3 mL Intracatheter Q8H   3 mL at 11/12/20 0810     sodium  chloride (PF) 0.9% PF flush 3 mL  3 mL Intracatheter q1 min prn         sodium chloride (PF) 0.9% PF flush 3 mL  3 mL Intracatheter Q8H   3 mL at 20 0759     sodium chloride 0.9% infusion   Intravenous Continuous 20 mL/hr at 20 0754       umeclidinium (INCRUSE ELLIPTA) 62.5 MCG/INH inhaler 1 puff  1 puff Inhalation Daily   1 puff at 20 0931     No current [facility]-ordered outpatient medications on file.       No Known Allergies      Physical Examination:   Vitals:    20 0400 20 0409 20 0600 20 0750   BP: 125/80  137/79 132/70   Pulse: 81 82 78 74   Resp:    Temp:    97.8  F (36.6  C)   TempSrc:    Axillary   SpO2: 97% 97% 95% 98%   Weight:       Height:         Body mass index is 29.86 kg/m .  Temp (24hrs), Av.2  F (36.8  C), Min:97.7  F (36.5  C), Max:98.7  F (37.1  C)    *Due to the current COVID-19 pandemic, with need to conserve PPE and minimize non-essential exposure to patients diagnosed or under investigation, a limited examination was performed on this patient. Interview was done via patient's hospital room telephone, and patient was visualized through hospital door window. Attending provider's physical exam findings were noted*        CMP  Recent Labs   Lab 20  0653 20  0630 20  1910    142 141   POTASSIUM 3.9 4.3 4.6   CHLORIDE 110* 111* 109   CO2 34* 27 28   ANIONGAP <1* 4 4   * 128* 146*   BUN 25 23 22   CR 0.58 0.57 0.55   GFRESTIMATED 88 89 90   GFRESTBLACK >90 >90 >90   YAMINI 7.8* 7.9* 7.9*   PROTTOTAL 7.0 6.9 6.7*   ALBUMIN 2.9* 2.7* 2.7*   BILITOTAL 0.3 0.2 0.2   ALKPHOS 61 64 63   AST 36 36 36   ALT 24 24 23     CBC  Recent Labs   Lab 20  0653 20  0630   WBC 7.4 8.7   RBC 3.40* 3.29*   HGB 10.4* 9.9*   HCT 33.9* 32.6*    99   MCH 30.6 30.1   MCHC 30.7* 30.4*   RDW 14.1 14.0    210     INR  Recent Labs   Lab 20  0653 20  0630   INR 1.11 1.01     Arterial Blood Gas  Recent Labs    Lab 11/13/20  0653 11/12/20  1330   PH  --  7.22*   PCO2  --  69*   PO2  --  71*   HCO3  --  28   O2PER 60 percent  50%     No results for input(s): CULT in the last 168 hours.    Diagnostic Studies:  Chest Radiology:    XR CHEST PORT 1 VW  11/12/2020 5:27 AM       INDICATION: follow up COVID 19 pneumonia and resp failure  COMPARISON: None                                                                       IMPRESSION: Coarse interstitial infiltrates in the upper lungs and  left lower lobe. Without a comparison examination, difficult to know  how much of the opacity is acute versus chronic. Lungs appear  hyperinflated. Heart size normal. No pleural effusion or pneumothorax.  Chronic fracture deformity of the right humerus partially visualized.    Assessment/Plan:     Acute on chronic hypoxic respiratory failure  End stage COPD on 4L NC baseline  COVID Pneumonia    Continue decadron 6mg IV daily for at least 10 days, will need prolong taper after that is completed  Continue remdesivir  Consult ID for convalescent plasma  Continue breo/incruse  Wean O2 if able, continue bipap for now    Jaime Paulson M.D.  Pulmonary, Critical Care and Sleep Medicine  [facility]   Pager: [phone]  Office:[phone]

## 2020-11-13 NOTE — PROGRESS NOTES
"Reviewed chart copies sent from care facility and discussed with RN , Nam, at [facility] that their primary contact is Patrice Frances, brother,  at phone [phone].  Left a message also on call back inquiring about a POLST that the records comment on.   1351  Call back received from nurse manager-Carolina- on patient's unit at [facility] and there is no POLST on file, their records reflect \"full code\" status.     I called Patrice at above number and he confirms he is patient's brother. He has not seen patient in many years but MD can call him to discuss POC.   "

## 2020-11-13 NOTE — CONSULTS
Consult Date:  11/13/2020      INFECTIOUS DISEASE CONSULTATION      LOCATION:  Room 311, [facility].      REFERRING PHYSICIAN:  Dr. Camacho      IMPRESSION:   1.  A 79-year-old female with acute COVID-19 pneumonia, progressive respiratory insufficiency, no clear secondary or other infection.   2.  Respiratory failure due to COVID-19 despite interventions.   3.  Obesity.   4.  Chronic venous stasis disease without obvious cellulitis.   5.  Chronic obstructive lung disease.      RECOMMENDATIONS:   1.  Continue remdesivir, on day 3, steroids, anticoagulation and nonspecific respiratory and other treatments.   2.  Convalescent plasma both not available and out of favor at present, as multiple progressive studies and data suggest no benefits and currently no supply anyway, not indicated at this point.  No indication for intervening with tocilizumab based on lab profile.   3.  Agree no antibiotics for now, but low threshold to add, especially given underlying COPD, but procalcitonin low and no obvious infection.      HISTORY OF PRESENT ILLNESS:  This 79-year-old female is seen in consultation, currently is quite hypoxic.  Limited history.  The patient has a history of some underlying lung disease, although it has not been a major issue.  She then had progressive increasing respiratory insufficiency, found to be COVID-19 positive, with progressive oxygen requirement.  She was seen in [place], transferred here due to bed situation.  Her labs show moderately abnormal inflammatory markers.  Procalcitonin was near 0.  Has bilateral opacities.  She has been started on remdesivir, for now avoiding intubation, but requiring high-flow oxygen.  She is also getting steroids and anticoagulation.      PAST MEDICAL HISTORY:  Some chronic lung disease, history of venous insufficiency, some antibiotic needs for venous stasis disease related to cellulitis historically per the record, history of paroxysmal atrial  fibrillation, some dysphagia, risk for aspiration, but not evident.      SOCIAL AND FAMILY HISTORY:  No definite exposures, but COVID-19 positive.      MEDICATIONS:  As listed.      REVIEW OF SYSTEMS:  Limited by the COVID.      EXAMINATION:  Limited exam due to COVID-19.  No obvious leg cellulitis.   Lungs have crackles on high-flow oxygen.      LABORATORY DATA:  COVID-19 positive.  No positive blood cultures.  Inflammatory markers with CRP at only 26.2, then 34.  Interleukin 13.8.  .  Procalcitonin 0 here and was 0.11 at outside hospital.  COVID-19 positive.  No positive cultures, no micro done here.      Thank you very much for consultation.  I will follow the patient with you.         CODY THOMPSON MD             D: 2020   T: 2020   MT: PAULINA      Name:     MIGUEL ÁNGEL CANTRELL   MRN:      [id number]        Account:       [id number]   :      1940           Consult Date:  2020      Document: [id number]

## 2020-11-13 NOTE — PROGRESS NOTES
Patient was switched from BiPAP to HFNC 40 LPM FiO2 80% with SpO2 in the low to mid 90's. Mepilex and opti foam in place. Skin intact. Will cont HFNC as tolerated.   Plan for pt to wear BiPAP overnight.  Will cont to monitor.  11/13/2020  Cathryn Carney, RT

## 2020-11-13 NOTE — PLAN OF CARE
PT: Order received. Per chart review and discussion with RN, pt with recent RRT, currently confused and on BiPap. Pt not appropriate for participation in therapy evaluation this morning. Will reschedule.

## 2020-11-13 NOTE — PLAN OF CARE
AVSS on Bipap 70% Fi02. Denies pain. Left hand cleaned with rolled washcloth to prevent further moisture to macerated area.  Diet NPO due to bipap; oral cares provided. Up with assist of 1. BS active and audible. INC of urine. Turned q2hrs.

## 2020-11-13 NOTE — PROGRESS NOTES
"[facility]  Hospitalist Progress Note   11/13/2020          Assessment and Plan:       Carolina Frances is a 79 year old female with medical history significant for COPD/emphysema with chronic hypoxemic respiratory failure on 4 LPM NC O2, depression, chronic venous insufficiency, paroxysmal A. fib, hypothyroidism, oropharyngeal dysphagia admitted on 11/11/2020 from outside hospital due to lack of beds for further management.    COVID 19 pneumonia  Acute hypoxic respiratory failure requiring noninvasive mechanical ventilation-multifactorial.  COPD/emphysema with chronic hypoxemic respiratory failure on 4 LPM NC O2  Possible obstructive sleep apnea.  COVID-19 on 11/8 and that came back positive as per the report (which I am not able to view). At nursing facility when staff went to check on her, O2 saturations were in 70s. EMS put her on 15 LPM O2 but was still hypoxic.  At [facility]  VBG showed pH 7.40, PCO2 44 and PO2 75.    Procalcitonin was 0.11.  WBC 4 lactic acid 1.1.  CRP 62.86  Fibrinogen 593 D-dimer 416  Ferritin 246    lactic acid 1.1.  Chest x-ray from outside hospital- chronic upper lobe scarring and bilateral interstitial opacities.  COVID-19 PCR positive from 11/10 from [facility].    Patient progressively hypoxic, continuing to needing BiPAP and has been n.p.o. for almost 36 hours now.-continue IMC status.  Have discussed with intensivist team, Dr. Cardenas patient needs to be electively intubated.  Given acute shortage of beds, patient compensating we will continue to monitor and IMC status.  If any decompensation noted patient must be intubated immediately.  Appreciate recommendation.  Have discussed with patient's brother Todd [reports he makes medical decisions for Carolina, confirmed with care team on the floor] did discuss critical illness and prognosis in the setting of multiple medical comorbid condition.  Please understanding regarding current clinical situation and recommends \"to do what " "needs to be done to keep going\".  Continue on BiPAP.  RT to assist with BiPAP.  N.p.o. while on BiPAP.  Plan to hold of tube feeding today, discussed with intensivist.  If no improvement will consider tube feeding tomorrow, discussed with patient's brother Todd and he would like t start tube feeds if need to.  Continue IV remdesivir and IV dexamethasone ( 10/11).  Continue IV famotidine for GI prophylaxis while on steroids.  Pulmonology consult requested as patient chronic COPD on supplemental oxygen, recommend infectious disease evaluation.   ID consult requested.Appreciate multidisciplinary team input.  ProCalcitonin repeat less than 0.05,  hold off antibiotics at this time.  CT of the chest with contrast to evaluate for PE, pulmonary infiltrates ending.  Labs as per protocol for COVID-19.  Discontinued subcutaneous Lovenox and started on IV heparin as below.  Continue PTA Advair and Incruse Ellipta  Continue PTA Combivent as needed for shortness of breath wheezing.     Elevated troponin likely in the setting of hypoxia, NSTEMI versus type II demand ischemia.  New systolic heart failure with EF of 30 to 35%.  Troponin at outside ED slightly elevated per report. POC cardiac ultrasound showed no pericardial effusion.  Troponin 0.036  > peaked to 2.0.  Echo The basal right ventricle contracts normally but the remaining walls appear at least moderately hypokinetic.  This study was extremely poor and wall motion cannot be assessed on this study. Assessment of LV ejection fraction would also be highly inaccurate. An EF of 30-35% is possibly present but this may be erroneous.  Technically difficult, suboptimal study.  We will trend troponin, continue telemetry monitoring.  Received 20 mg IV Lasix on 11/12, repeat dose of 20 mg IV Lasix today.  We will check proBNP.  We will start on IV heparin drip until cardiology evaluation given troponin peaked to 2.0.  Cardiology consult requested.  Continue PTA aspirin, " simvastatin.  Strict input output monitoring, daily weights.    Paroxysmal A. Fib   Hypertension, hyperlipidemia.  Overnight had A. fib with RVR, RRT called, received IV metoprolol.  Changed PTA metoprolol to IV metoprolol with hold parameters.  New IV metoprolol 2.5 mg every 6 hours.  Continue PTA aspirin when able to swallow.  Continue PTA Lipitor when able to swallow.    Acute anemia.  Unclear baseline.  Hemoglobin at outside ED 11.5, dropped to 9.6 this morning  We will check proBNP, diuresis prn  Monitor hemoglobin levels.    Hypothyroidism  Continue PTA levothyroxine when able to swallow.    Oropharyngeal dysphagia  Currently n.p.o. was on BiPAP. Tube Feeding as above likely tomorrow.    Depression  Continue PTA Paxil been able to swallow.    Chronic venous insufficiency  ACE wraps prn and limb elevation     Obesity with a BMI of 29.86.  Will need lifestyle modification with diet and exercise as able to.  Will need to be evaluated for sleep studies as outpatient.    Chronic constipation.  As needed bowel regimen.    Physical deconditioning from ongoing medical illness, obesity.  Patient resident of nursing facility.  PT ordered.      Diet: NPO for Medical/Clinical Reasons Except for: Meds    DVT Prophylaxis:  Lovenox SQ  Poon Catheter: not present  Code Status:   Full code, discussed with patient's brother.     Discussed with patient, her brother over the telephone, bedside RN.  Intensivist,, care coordinator.  Total time greater than 65 minutes out of which greater than 70% of the time was spent in direct patient care.    Laila Camacho MD        Interval History:      Lying in bed.  On BiPAP.  Mild use of respiratory muscles.  Unable to obtain review of systems while on BiPAP.  Overnight had RRT for A. fib with RVR, refer to RRT note for details.           Physical Exam:        Physical Exam   Temp:  [97.7  F (36.5  C)-98.7  F (37.1  C)] 97.8  F (36.6  C)  Pulse:  [] 81  Resp:  [19-36] 21  BP:  ()/() 136/71  FiO2 (%):  [60 %] 60 %  SpO2:  [84 %-98 %] 96 %    Intake/Output Summary (Last 24 hours) at 11/12/2020 1257  Last data filed at 11/12/2020 0400  Gross per 24 hour   Intake 600 ml   Output --   Net 600 ml       Admission Weight: 83.9 kg (185 lb)  Current Weight: 83.9 kg (185 lb)    PHYSICAL EXAM  GENERAL: Patient on BiPAP.  HEART:Technically difficult auscultation given obesity, humidifier in room.  LUNGS: Decreased breath sounds, respirations labored. Technically difficult auscultation given obesity, humidifier in room.  NEURO: Moving all extremities.  EXTREMITIES: ++pedal edema.  SKIN: Warm, dry.  PSYCHIATRY Calm        Medications:          aspirin  81 mg Oral Daily     atorvastatin  40 mg Oral At Bedtime     dexamethasone  6 mg Intravenous Q24H     enoxaparin ANTICOAGULANT  40 mg Subcutaneous Q24H     famotidine  20 mg Intravenous Q12H     fluticasone-vilanterol  1 puff Inhalation Daily     hypromellose-dextran  1 drop Left Eye 4x Daily     insulin aspart  1-6 Units Subcutaneous Q4H     levothyroxine  50 mcg Oral Daily     metoprolol  2.5 mg Intravenous Q6H     PARoxetine  10 mg Oral QAM     polyethylene glycol  17 g Oral Daily     remdesivir  100 mg Intravenous Q24H     sennosides  1 tablet Oral BID     sodium chloride  2 spray Both Nostrils TID     sodium chloride (PF)  10 mL Intravenous Once     sodium chloride (PF)  3 mL Intracatheter Q8H     sodium chloride (PF)  3 mL Intracatheter Q8H     umeclidinium  1 puff Inhalation Daily     acetaminophen, acetaminophen, albuterol, artificial tears ophthalmic solution, glucose **OR** dextrose **OR** glucagon, hydrALAZINE, ipratropium-albuterol, labetalol, lidocaine 4%, lidocaine 4%, lidocaine (buffered or not buffered), lidocaine (buffered or not buffered), - MEDICATION INSTRUCTIONS -, metoprolol, nitroGLYcerin, - MEDICATION INSTRUCTIONS -, ondansetron **OR** ondansetron, - MEDICATION INSTRUCTIONS -, polyethylene glycol, sodium chloride (PF),  sodium chloride (PF)         Data:      All new lab and imaging data was reviewed.

## 2020-11-13 NOTE — CONSULTS
ID consult dictated IMP 1 78 yo female COVID 19 pneumonia    REC steroids/Remdesivir/supportive care /AC, plasma both currently  not available and out of favor based on accumulating data

## 2020-11-13 NOTE — PLAN OF CARE
Pt confused, repeats request, continues on Bipap, lungs diminished, troponin's elevated, MD aware, recheck at 1900, tried to put pt in chair this shift, she refused to stay in chair, yelling at nursing staff to put her back to bed, when back in bed pt continued to say she wanted to get to bed and sleep, more confused and agitated at this time, new order received for ativan, pt incontinent of urine, brother contacted and knows that she is in hospital

## 2020-11-14 LAB
ALBUMIN SERPL-MCNC: 3.3 G/DL (ref 3.4–5)
ALP SERPL-CCNC: 65 U/L (ref 40–150)
ALT SERPL W P-5'-P-CCNC: 26 U/L (ref 0–50)
ANION GAP SERPL CALCULATED.3IONS-SCNC: 3 MMOL/L (ref 3–14)
ANION GAP SERPL CALCULATED.3IONS-SCNC: 5 MMOL/L (ref 3–14)
AST SERPL W P-5'-P-CCNC: 36 U/L (ref 0–45)
BASOPHILS # BLD AUTO: 0.1 10E9/L (ref 0–0.2)
BASOPHILS NFR BLD AUTO: 0.6 %
BILIRUB SERPL-MCNC: 0.5 MG/DL (ref 0.2–1.3)
BUN SERPL-MCNC: 28 MG/DL (ref 7–30)
BUN SERPL-MCNC: 29 MG/DL (ref 7–30)
CALCIUM SERPL-MCNC: 7.8 MG/DL (ref 8.5–10.1)
CALCIUM SERPL-MCNC: 8.8 MG/DL (ref 8.5–10.1)
CHLORIDE SERPL-SCNC: 104 MMOL/L (ref 94–109)
CHLORIDE SERPL-SCNC: 108 MMOL/L (ref 94–109)
CO2 SERPL-SCNC: 30 MMOL/L (ref 20–32)
CO2 SERPL-SCNC: 34 MMOL/L (ref 20–32)
CREAT SERPL-MCNC: 0.61 MG/DL (ref 0.52–1.04)
CREAT SERPL-MCNC: 0.68 MG/DL (ref 0.52–1.04)
CRP SERPL-MCNC: 30.2 MG/L (ref 0–8)
D DIMER PPP FEU-MCNC: 2 UG/ML FEU (ref 0–0.5)
DIFFERENTIAL METHOD BLD: ABNORMAL
EOSINOPHIL # BLD AUTO: 0 10E9/L (ref 0–0.7)
EOSINOPHIL NFR BLD AUTO: 0 %
ERYTHROCYTE [DISTWIDTH] IN BLOOD BY AUTOMATED COUNT: 14 % (ref 10–15)
FIBRINOGEN PPP-MCNC: 492 MG/DL (ref 200–420)
GFR SERPL CREATININE-BSD FRML MDRD: 84 ML/MIN/{1.73_M2}
GFR SERPL CREATININE-BSD FRML MDRD: 87 ML/MIN/{1.73_M2}
GLUCOSE BLDC GLUCOMTR-MCNC: 124 MG/DL (ref 70–99)
GLUCOSE BLDC GLUCOMTR-MCNC: 137 MG/DL (ref 70–99)
GLUCOSE BLDC GLUCOMTR-MCNC: 181 MG/DL (ref 70–99)
GLUCOSE BLDC GLUCOMTR-MCNC: 211 MG/DL (ref 70–99)
GLUCOSE BLDC GLUCOMTR-MCNC: 244 MG/DL (ref 70–99)
GLUCOSE SERPL-MCNC: 183 MG/DL (ref 70–99)
GLUCOSE SERPL-MCNC: 183 MG/DL (ref 70–99)
HCT VFR BLD AUTO: 36.4 % (ref 35–47)
HGB BLD-MCNC: 11.3 G/DL (ref 11.7–15.7)
IMM GRANULOCYTES # BLD: 0.1 10E9/L (ref 0–0.4)
IMM GRANULOCYTES NFR BLD: 1.1 %
INR PPP: 1.18 (ref 0.86–1.14)
INTERPRETATION ECG - MUSE: NORMAL
LACTATE BLD-SCNC: 1.5 MMOL/L (ref 0.7–2)
LDH SERPL L TO P-CCNC: 412 U/L (ref 81–234)
LYMPHOCYTES # BLD AUTO: 1.2 10E9/L (ref 0.8–5.3)
LYMPHOCYTES NFR BLD AUTO: 15.1 %
MAGNESIUM SERPL-MCNC: 2.6 MG/DL (ref 1.6–2.3)
MCH RBC QN AUTO: 30.1 PG (ref 26.5–33)
MCHC RBC AUTO-ENTMCNC: 31 G/DL (ref 31.5–36.5)
MCV RBC AUTO: 97 FL (ref 78–100)
MONOCYTES # BLD AUTO: 0.7 10E9/L (ref 0–1.3)
MONOCYTES NFR BLD AUTO: 8.7 %
NEUTROPHILS # BLD AUTO: 5.9 10E9/L (ref 1.6–8.3)
NEUTROPHILS NFR BLD AUTO: 74.5 %
NRBC # BLD AUTO: 0 10*3/UL
NRBC BLD AUTO-RTO: 0 /100
NT-PROBNP SERPL-MCNC: ABNORMAL PG/ML (ref 0–1800)
PLATELET # BLD AUTO: 307 10E9/L (ref 150–450)
PLATELET # BLD EST: ABNORMAL 10*3/UL
POTASSIUM SERPL-SCNC: 3.5 MMOL/L (ref 3.4–5.3)
POTASSIUM SERPL-SCNC: 3.9 MMOL/L (ref 3.4–5.3)
PROT SERPL-MCNC: 7.9 G/DL (ref 6.8–8.8)
RBC # BLD AUTO: 3.75 10E12/L (ref 3.8–5.2)
RBC MORPH BLD: ABNORMAL
RETICS # AUTO: 39.8 10E9/L (ref 25–95)
RETICS/RBC NFR AUTO: 1.1 % (ref 0.5–2)
SODIUM SERPL-SCNC: 141 MMOL/L (ref 133–144)
SODIUM SERPL-SCNC: 143 MMOL/L (ref 133–144)
STOMATOCYTES BLD QL SMEAR: SLIGHT
TROPONIN I SERPL-MCNC: 0.83 UG/L (ref 0–0.04)
TROPONIN I SERPL-MCNC: 1.04 UG/L (ref 0–0.04)
TROPONIN I SERPL-MCNC: 1.14 UG/L (ref 0–0.04)
TSH SERPL DL<=0.005 MIU/L-ACNC: 0.9 MU/L (ref 0.4–4)
UFH PPP CHRO-ACNC: 0.24 IU/ML
UFH PPP CHRO-ACNC: 0.36 IU/ML
UFH PPP CHRO-ACNC: 0.61 IU/ML
WBC # BLD AUTO: 7.9 10E9/L (ref 4–11)

## 2020-11-14 PROCEDURE — 80053 COMPREHEN METABOLIC PANEL: CPT | Performed by: HOSPITALIST

## 2020-11-14 PROCEDURE — 250N000011 HC RX IP 250 OP 636: Performed by: HOSPITALIST

## 2020-11-14 PROCEDURE — 250N000009 HC RX 250: Performed by: NURSE PRACTITIONER

## 2020-11-14 PROCEDURE — 250N000013 HC RX MED GY IP 250 OP 250 PS 637: Performed by: NURSE PRACTITIONER

## 2020-11-14 PROCEDURE — 258N000003 HC RX IP 258 OP 636: Performed by: HOSPITALIST

## 2020-11-14 PROCEDURE — 85384 FIBRINOGEN ACTIVITY: CPT | Performed by: HOSPITALIST

## 2020-11-14 PROCEDURE — 80048 BASIC METABOLIC PNL TOTAL CA: CPT | Performed by: NURSE PRACTITIONER

## 2020-11-14 PROCEDURE — 999N000157 HC STATISTIC RCP TIME EA 10 MIN

## 2020-11-14 PROCEDURE — 250N000009 HC RX 250: Performed by: HOSPITALIST

## 2020-11-14 PROCEDURE — 85045 AUTOMATED RETICULOCYTE COUNT: CPT | Performed by: HOSPITALIST

## 2020-11-14 PROCEDURE — 84484 ASSAY OF TROPONIN QUANT: CPT | Performed by: NURSE PRACTITIONER

## 2020-11-14 PROCEDURE — 83615 LACTATE (LD) (LDH) ENZYME: CPT | Performed by: HOSPITALIST

## 2020-11-14 PROCEDURE — 83735 ASSAY OF MAGNESIUM: CPT | Performed by: NURSE PRACTITIONER

## 2020-11-14 PROCEDURE — 999N001017 HC STATISTIC GLUCOSE BY METER IP

## 2020-11-14 PROCEDURE — 99291 CRITICAL CARE FIRST HOUR: CPT | Performed by: NURSE PRACTITIONER

## 2020-11-14 PROCEDURE — 120N000013 HC R&B IMCU

## 2020-11-14 PROCEDURE — 93005 ELECTROCARDIOGRAM TRACING: CPT

## 2020-11-14 PROCEDURE — 84443 ASSAY THYROID STIM HORMONE: CPT | Performed by: NURSE PRACTITIONER

## 2020-11-14 PROCEDURE — 83605 ASSAY OF LACTIC ACID: CPT | Performed by: NURSE PRACTITIONER

## 2020-11-14 PROCEDURE — 36415 COLL VENOUS BLD VENIPUNCTURE: CPT | Performed by: NURSE PRACTITIONER

## 2020-11-14 PROCEDURE — 86140 C-REACTIVE PROTEIN: CPT | Performed by: HOSPITALIST

## 2020-11-14 PROCEDURE — 85520 HEPARIN ASSAY: CPT | Performed by: HOSPITALIST

## 2020-11-14 PROCEDURE — 83880 ASSAY OF NATRIURETIC PEPTIDE: CPT | Performed by: NURSE PRACTITIONER

## 2020-11-14 PROCEDURE — 258N000003 HC RX IP 258 OP 636: Performed by: NURSE PRACTITIONER

## 2020-11-14 PROCEDURE — 85025 COMPLETE CBC W/AUTO DIFF WBC: CPT | Performed by: HOSPITALIST

## 2020-11-14 PROCEDURE — 99233 SBSQ HOSP IP/OBS HIGH 50: CPT | Performed by: HOSPITALIST

## 2020-11-14 PROCEDURE — 84484 ASSAY OF TROPONIN QUANT: CPT | Performed by: HOSPITALIST

## 2020-11-14 PROCEDURE — 85379 FIBRIN DEGRADATION QUANT: CPT | Performed by: HOSPITALIST

## 2020-11-14 PROCEDURE — 93010 ELECTROCARDIOGRAM REPORT: CPT | Performed by: INTERNAL MEDICINE

## 2020-11-14 PROCEDURE — 36415 COLL VENOUS BLD VENIPUNCTURE: CPT | Performed by: HOSPITALIST

## 2020-11-14 PROCEDURE — 120N000001 HC R&B MED SURG/OB

## 2020-11-14 PROCEDURE — 250N000011 HC RX IP 250 OP 636: Performed by: NURSE PRACTITIONER

## 2020-11-14 PROCEDURE — 85610 PROTHROMBIN TIME: CPT | Performed by: HOSPITALIST

## 2020-11-14 RX ORDER — POTASSIUM CHLORIDE 7.45 MG/ML
10 INJECTION INTRAVENOUS
Status: ACTIVE | OUTPATIENT
Start: 2020-11-14 | End: 2020-11-14

## 2020-11-14 RX ORDER — METOPROLOL TARTRATE 1 MG/ML
5 INJECTION, SOLUTION INTRAVENOUS ONCE
Status: COMPLETED | OUTPATIENT
Start: 2020-11-14 | End: 2020-11-14

## 2020-11-14 RX ORDER — METOPROLOL TARTRATE 1 MG/ML
2.5 INJECTION, SOLUTION INTRAVENOUS
Status: DISCONTINUED | OUTPATIENT
Start: 2020-11-14 | End: 2020-11-21

## 2020-11-14 RX ORDER — METOPROLOL TARTRATE 1 MG/ML
2.5 INJECTION, SOLUTION INTRAVENOUS EVERY 4 HOURS PRN
Status: DISCONTINUED | OUTPATIENT
Start: 2020-11-14 | End: 2020-11-21

## 2020-11-14 RX ORDER — FUROSEMIDE 10 MG/ML
40 INJECTION INTRAMUSCULAR; INTRAVENOUS ONCE
Status: COMPLETED | OUTPATIENT
Start: 2020-11-14 | End: 2020-11-14

## 2020-11-14 RX ORDER — DIGOXIN 0.25 MG/ML
500 INJECTION INTRAMUSCULAR; INTRAVENOUS ONCE
Status: COMPLETED | OUTPATIENT
Start: 2020-11-14 | End: 2020-11-14

## 2020-11-14 RX ORDER — POTASSIUM CHLORIDE 1.5 G/1.58G
40 POWDER, FOR SOLUTION ORAL ONCE
Status: COMPLETED | OUTPATIENT
Start: 2020-11-14 | End: 2020-11-14

## 2020-11-14 RX ORDER — METOPROLOL TARTRATE 1 MG/ML
2.5 INJECTION, SOLUTION INTRAVENOUS ONCE
Status: COMPLETED | OUTPATIENT
Start: 2020-11-14 | End: 2020-11-14

## 2020-11-14 RX ORDER — FUROSEMIDE 10 MG/ML
60 INJECTION INTRAMUSCULAR; INTRAVENOUS ONCE
Status: COMPLETED | OUTPATIENT
Start: 2020-11-14 | End: 2020-11-14

## 2020-11-14 RX ADMIN — DIGOXIN 500 MCG: 0.25 INJECTION INTRAMUSCULAR; INTRAVENOUS at 03:35

## 2020-11-14 RX ADMIN — Medication 2 SPRAY: at 22:41

## 2020-11-14 RX ADMIN — METOPROLOL TARTRATE 2.5 MG: 5 INJECTION INTRAVENOUS at 14:46

## 2020-11-14 RX ADMIN — DEXAMETHASONE SODIUM PHOSPHATE 6 MG: 4 INJECTION, SOLUTION INTRAMUSCULAR; INTRAVENOUS at 18:13

## 2020-11-14 RX ADMIN — FAMOTIDINE 20 MG: 10 INJECTION INTRAVENOUS at 08:20

## 2020-11-14 RX ADMIN — Medication 2 SPRAY: at 16:04

## 2020-11-14 RX ADMIN — REMDESIVIR 100 MG: 100 INJECTION, POWDER, LYOPHILIZED, FOR SOLUTION INTRAVENOUS at 21:24

## 2020-11-14 RX ADMIN — DEXTRAN 70, GLYCERIN, HYPROMELLOSE 1 DROP: 1; 2; 3 SOLUTION/ DROPS OPHTHALMIC at 22:41

## 2020-11-14 RX ADMIN — INSULIN ASPART 1 UNITS: 100 INJECTION, SOLUTION INTRAVENOUS; SUBCUTANEOUS at 20:15

## 2020-11-14 RX ADMIN — FUROSEMIDE 40 MG: 10 INJECTION, SOLUTION INTRAMUSCULAR; INTRAVENOUS at 12:38

## 2020-11-14 RX ADMIN — DEXTRAN 70, GLYCERIN, HYPROMELLOSE 1 DROP: 1; 2; 3 SOLUTION/ DROPS OPHTHALMIC at 12:39

## 2020-11-14 RX ADMIN — METOPROLOL TARTRATE 2.5 MG: 5 INJECTION INTRAVENOUS at 12:38

## 2020-11-14 RX ADMIN — FUROSEMIDE 60 MG: 10 INJECTION, SOLUTION INTRAVENOUS at 02:59

## 2020-11-14 RX ADMIN — HEPARIN SODIUM 650 UNITS/HR: 10000 INJECTION, SOLUTION INTRAVENOUS at 16:45

## 2020-11-14 RX ADMIN — POTASSIUM CHLORIDE 40 MEQ: 1.5 POWDER, FOR SOLUTION ORAL at 03:05

## 2020-11-14 RX ADMIN — INSULIN ASPART 2 UNITS: 100 INJECTION, SOLUTION INTRAVENOUS; SUBCUTANEOUS at 03:43

## 2020-11-14 RX ADMIN — DEXTRAN 70, GLYCERIN, HYPROMELLOSE 1 DROP: 1; 2; 3 SOLUTION/ DROPS OPHTHALMIC at 18:13

## 2020-11-14 RX ADMIN — CALCIUM GLUCONATE 1 G: 98 INJECTION, SOLUTION INTRAVENOUS at 02:51

## 2020-11-14 RX ADMIN — FAMOTIDINE 20 MG: 10 INJECTION INTRAVENOUS at 20:17

## 2020-11-14 RX ADMIN — UMECLIDINIUM 1 PUFF: 62.5 AEROSOL, POWDER ORAL at 08:24

## 2020-11-14 RX ADMIN — METOPROLOL TARTRATE 2.5 MG: 5 INJECTION INTRAVENOUS at 06:12

## 2020-11-14 RX ADMIN — METOPROLOL TARTRATE 2.5 MG: 5 INJECTION INTRAVENOUS at 18:12

## 2020-11-14 RX ADMIN — INSULIN ASPART 3 UNITS: 100 INJECTION, SOLUTION INTRAVENOUS; SUBCUTANEOUS at 15:50

## 2020-11-14 RX ADMIN — METOPROLOL TARTRATE 5 MG: 5 INJECTION INTRAVENOUS at 01:15

## 2020-11-14 RX ADMIN — METOPROLOL TARTRATE 5 MG: 5 INJECTION INTRAVENOUS at 08:52

## 2020-11-14 ASSESSMENT — ACTIVITIES OF DAILY LIVING (ADL)
ADLS_ACUITY_SCORE: 26
ADLS_ACUITY_SCORE: 23
ADLS_ACUITY_SCORE: 26
ADLS_ACUITY_SCORE: 28
ADLS_ACUITY_SCORE: 26
ADLS_ACUITY_SCORE: 23

## 2020-11-14 ASSESSMENT — MIFFLIN-ST. JEOR: SCORE: 1332.75

## 2020-11-14 NOTE — PROGRESS NOTES
"PULMONOLOGY PROGRESS NOTE    Date of Admission: 11/11/2020    CC/Reason for Hospital visit: COPD, COVID-19 pneumonia  SUBJECTIVE      Events of last night reviewed. Stable night. No new respiratory problems. Patient on 80% FiO2 HFNC overnight.    ROS: A Problem Pertinent review of systems was negative except for items noted in HPI.  Past Medical, Family, and Social/Substance History has been reviewed: No interval changes.    OBJECTIVE   Vital signs:  Temp: 97.8  F (36.6  C) Temp src: Oral BP: (!) 140/81 Pulse: 84   Resp: 17 SpO2: 97 % O2 Device: High Flow Nasal Cannula (HFNC) Oxygen Delivery: 40 LPM Height: 167.6 cm (5' 6\") Weight: 83.1 kg (183 lb 3.2 oz)  Estimated body mass index is 29.57 kg/m  as calculated from the following:    Height as of this encounter: 1.676 m (5' 6\").    Weight as of this encounter: 83.1 kg (183 lb 3.2 oz).      I/O last 3 completed shifts:  In: 1112 [P.O.:480; I.V.:632]  Out: 2300 [Urine:2300]      *Due to the current COVID-19 pandemic, with need to conserve PPE and minimize non-essential exposure to patients diagnosed or under investigation, a limited examination was performed on this patient. Interview was done via patient's hospital room telephone, and patient was visualized through hospital door window. Attending provider's physical exam findings were noted*      LABORATORY ASSESSMENT    Arterial Blood Gas  Recent Labs   Lab 11/13/20  0653 11/12/20  1330   PH  --  7.22*   PCO2  --  69*   PO2  --  71*   HCO3  --  28   O2PER 60 percent  50%     CBC  Recent Labs   Lab 11/14/20  0528 11/13/20  1153 11/13/20  0653 11/12/20  0630   WBC 7.9 5.7 7.4 8.7   RBC 3.75* 3.25* 3.40* 3.29*   HGB 11.3* 9.6* 10.4* 9.9*   HCT 36.4 32.4* 33.9* 32.6*   MCV 97 100 100 99   MCH 30.1 29.5 30.6 30.1   MCHC 31.0* 29.6* 30.7* 30.4*   RDW 14.0 14.1 14.1 14.0    217 235 210     BMP  Recent Labs   Lab 11/14/20  0528 11/14/20  0133 11/13/20  0653 11/12/20  0630    143 143 142   POTASSIUM 3.9 3.5 3.9 " 4.3   CHLORIDE 104 108 110* 111*   YAMINI 8.8 7.8* 7.8* 7.9*   CO2 34* 30 34* 27   BUN 29 28 25 23   CR 0.68 0.61 0.58 0.57   * 183* 125* 128*     INR  Recent Labs   Lab 11/14/20  0528 11/13/20  0653 11/12/20  0630   INR 1.18* 1.11 1.01      BNPNo lab results found in last 7 days.  VENOUS BLOOD GASES  Recent Labs   Lab 11/13/20  0653   PHV 7.35   PCO2V 58*   PO2V 44   HCO3V 32*   ENRIQUE 4.9       Additional labs and/or comments:    IMAGING      CXR 11/12 -  IMPRESSION: Coarse interstitial infiltrates in the upper lungs and  left lower lobe. Without a comparison examination, difficult to know  how much of the opacity is acute versus chronic. Lungs appear  hyperinflated. Heart size normal. No pleural effusion or pneumothorax.  Chronic fracture deformity of the right humerus partially visualized.    PFT & OTHER TESTING       ASSESSMENT / PLAN      Pulmonary Diagnoses:  Abnl CT/CXR R91.8  COPD J44.9  COVID-19 U07.1  Hypoxemia R09.02  Resp fail acute J96.00  Resp fail chronic J96.10    Additional COVID-19 diagnoses:  Pneumonia due to confirmed COVID-19 J12.89, U07.1    ASSESSMENT: 79-year-old obese female nursing home resident with multiple medical problems including severe oxygen dependent COPD admitted for worsening hypoxemia secondary to COVID-19 pneumonia.  Chest x-ray on admission showed coarse interstitial infiltrates in the upper lobes and left lower lobe with hyperinflated lung fields.  Patient was treated with BiPAP and started on remdesivir and Decadron.  Patient has been transitioned to high flow nasal cannula.  Would continue present supportive treatment for now.    PLAN:  1. Adjust oxygen, keep SaO2 > 90%  2. BiPAP prn.  3. Bronchodilators - Breo 200/25, Incruse  4. Remdesivir per ID.  5. Steroids - Decadron.  6. Follow CXR.    No further suggestions at this time. Dr. Torres will see pt in follow-up on Monday. Please call if needed over the WE.      Billy Hyman M.D.    [facility] [place]  [phone]   (pager)  [phone]   (office)

## 2020-11-14 NOTE — PLAN OF CARE
Alert to self and place at times. Forgetful. VSS on 40L high flow O2, to be placed on bipap for overnight. NPO. Turn and reposition. Tele SR. Special precautions maintained. Blood glucose stable. Heparin running at 1,000 units/hr. Redness under breasts. Left hand scabbed. Denies pain. Oral cares provided.

## 2020-11-14 NOTE — PLAN OF CARE
PT: Per discussion with RN, pt not appropriate for participation in therapy this date. Pt with difficulty following commands, heart rate control and respiratory concerns. Will hold on therapy evaluation and allow for medical management.

## 2020-11-14 NOTE — PROGRESS NOTES
Chart reviewed. Please re-order echo when more respiratory stable to assess LVEF as prior echo is not interpretable with high degree of confidence.

## 2020-11-14 NOTE — PROGRESS NOTES
"[facility]  Hospitalist Progress Note   11/14/2020          Assessment and Plan:       Carolina Frances is a 79 year old female with medical history significant for COPD/emphysema with chronic hypoxemic respiratory failure on 4 LPM NC O2, depression, chronic venous insufficiency, paroxysmal A. fib, hypothyroidism, oropharyngeal dysphagia admitted on 11/11/2020 from outside hospital due to lack of beds for further management.    COVID 19 pneumonia  Acute hypoxic respiratory failure requiring noninvasive mechanical ventilation-multifactorial.  COPD/emphysema with chronic hypoxemic respiratory failure on 4 LPM NC O2  Possible obstructive sleep apnea.  COVID-19 on 11/8 and that came back positive as per the report. At nursing facility when staff went to check on her, O2 saturations were in 70s. EMS put her on 15 LPM O2 but was still hypoxic.  At [facility]  VBG showed pH 7.40, PCO2 44 and PO2 75.    Procalcitonin was 0.11.  WBC 4 lactic acid 1.1.  CRP 62.86  Fibrinogen 593 D-dimer 416  Ferritin 246    lactic acid 1.1.  Chest x-ray from outside hospital- chronic upper lobe scarring and bilateral interstitial opacities.  COVID-19 PCR positive from 11/10 from [facility].    Patient was progressively hypoxic, was on BiPAP for almost 36 to 48 hours.  Weaned off from BiPAP to high flow nasal oxygen overnight, continue high flow nasal oxygen.  Discussed with intensivist team on 11/13, given acute shortage of beds, patient compensating we will continue to monitor under IMC status.  If any decompensation noted patient must be intubated immediately.  Appreciate recommendation.  Have discussed with patient's brother Todd on 11/13 [reports he makes medical decisions for Carolina, confirmed with care team on the floor] did discuss critical illness and prognosis in the setting of multiple medical comorbid condition.  Expressed understanding regarding current clinical situation and recommends \"to do what needs to be done to " "keep going\".  Continue on flow nasal oxygen with intermittent BiPAP as needed.  Appreciate RT assistance.  Continue IV remdesivir and IV dexamethasone ( 10/11).  Continue IV famotidine for GI prophylaxis while on steroids.  ProCalcitonin repeat less than 0.05,  hold off antibiotics at this time.  Pulmonology following, recommend continue steroids and remdesivir.  ID following, in agreement with holding of antibiotics at this time.  Appreciate ID, pulmonology input.  CT of the chest with contrast to evaluate for PE, pulmonary infiltrates pending.  Discontinued subcutaneous Lovenox and started on IV heparin as below.  Continue PTA Advair and Incruse Ellipta  Continue PTA Combivent as needed for shortness of breath wheezing.     Elevated troponin likely in the setting of hypoxia, NSTEMI versus type II demand ischemia.  New systolic heart failure with EF of 30 to 35%.  Troponin at outside ED slightly elevated per report. POC cardiac ultrasound showed no pericardial effusion. Troponin 0.036  > peaked to 2.0 and trending down to 1.038.  Echo basal right ventricle contracts normally but the remaining walls appear at least moderately hypokinetic. This study was extremely poor and wall motion cannot be assessed on this study. Assessment of LV ejection fraction would also be highly inaccurate. An EF of 30-35% is possibly present but this may be erroneous. Technically difficult, suboptimal study.  Continue telemetry monitoring.  Has been receiving IV Lasix from 11/12 [20 mg] 11/13 (20 mg].  proBNP 14,786 - this morning received 60 mg IV Lasix during RRT, will administer 40 mg IV Lasix.  Continue IV heparin drip.  Cardiology following, will await recommendations today.  Appreciate comanagement.  Continue PTA aspirin, simvastatin.  Strict input output monitoring, daily weights.    Paroxysmal A. Fib with intermittent RVR.  Hypertension, hyperlipidemia.  TSH within normal limits.  Potassium 3.9, magnesium 2.6.  Has been having " intermittent episodes of A. fib with RVR, RRT's and receiving IV metoprolol.  On and off sinus rhythm.  Continue IV metoprolol 12.5 mg every 6 hours, as needed IV metoprolol ordered.  Diuresis as above, EP consult requested.  Cardiology following, will await recommendations today.  Continue PTA aspirin   Continue telemetry monitoring, monitor electrolytes closely.    Acute anemia.  Unclear baseline.  Hemoglobin at outside ED 11.5, dropped to 9.6>11.3  Monitor hemoglobin levels.    Hypothyroidism  Continue PTA levothyroxine   TSH within normal limits.    Depression  Continue PTA Paxil    Chronic venous insufficiency  ACE wraps prn and limb elevation     Obesity with a BMI of 29.86.  Will need lifestyle modification with diet and exercise as able to.  Will need to be evaluated for sleep studies as outpatient.    Chronic constipation.  As needed bowel regimen.    Physical deconditioning from ongoing medical illness, obesity.  Patient resident of nursing facility.  PT     Concern for cognitive impairment.  Patient resident of [facility], per discussion with brother Todd he last met her few years back.  He is unclear about her baseline cognitive status.  During encounter patient quite repetitive, has been having on and off confusion, unclear if in the setting of acute medical illness, hospitalization and isolation.  Will need OT for cognitive evaluation once medical condition stabilizes.    Nutrition  Oropharyngeal dysphagia  N.p.o. while on BiPAP.   Discussed with patient's brother Todd on 11/13 and he would like to start tube feeds if need to.  Start on clear liquid diet today while on high flow nasal oxygen with strict aspiration precautions.  Speech therapy evaluation requested.     Diet: NPO for Medical/Clinical Reasons Except for: Meds    DVT Prophylaxis:  Lovenox SQ  Poon Catheter: not present  Code Status:   Full code, discussed with patient's brother.     Discussed with patient, bedside RN.  Respiratory  therapist  Time greater than 35 minutes out of which greater than 60% of the time was spent with direct patient care.     Laila Camacho MD        Interval History:      Lying in bed.  Was on BiPAP, weaned off to high flow nasal oxygen last night.  Patient with intermittent episodes of confusion, reports would like to leave.  Denies any chest pain.  Unable to obtain further review of systems.         Physical Exam:        Physical Exam   Temp:  [97.7  F (36.5  C)-98.3  F (36.8  C)] 97.8  F (36.6  C)  Pulse:  [] 82  Resp:  [13-24] 21  BP: ()/(45-93) 154/76  FiO2 (%):  [60 %-80 %] 80 %  SpO2:  [92 %-100 %] 98 %    Intake/Output Summary (Last 24 hours) at 11/12/2020 1257  Last data filed at 11/12/2020 0400  Gross per 24 hour   Intake 600 ml   Output --   Net 600 ml       Admission Weight: 83.9 kg (185 lb)  Current Weight: 83.9 kg (185 lb)    PHYSICAL EXAM  GENERAL: Patient lying in bed.  HEART:Technically difficult auscultation given obesity, humidifier in room.  LUNGS: Decreased breath sounds due to body habitus,  humidifier in room.  Respirations not labored.  NEURO: Moving all extremities.  EXTREMITIES: ++pedal edema.  SKIN: Warm, dry.  PSYCHIATRY awake and repetitive       Medications:          aspirin  81 mg Oral Daily     atorvastatin  40 mg Oral At Bedtime     dexamethasone  6 mg Intravenous Q24H     famotidine  20 mg Intravenous Q12H     fluticasone-vilanterol  1 puff Inhalation Daily     furosemide  40 mg Intravenous Once     hypromellose-dextran  1 drop Left Eye 4x Daily     insulin aspart  1-6 Units Subcutaneous Q4H     iopamidol  78 mL Intravenous Once     levothyroxine  50 mcg Oral Daily     metoprolol  2.5 mg Intravenous Q6H     midazolam  0.5 mg Intravenous Once     PARoxetine  10 mg Oral QAM     polyethylene glycol  17 g Oral Daily     remdesivir  100 mg Intravenous Q24H     sodium chloride 0.9 %  100 mL Intravenous Once     sennosides  1 tablet Oral BID     sodium chloride  2 spray Both  Nostrils TID     sodium chloride (PF)  10 mL Intravenous Once     sodium chloride (PF)  3 mL Intracatheter Q8H     sodium chloride (PF)  3 mL Intracatheter Q8H     umeclidinium  1 puff Inhalation Daily     acetaminophen, acetaminophen, albuterol, artificial tears ophthalmic solution, glucose **OR** dextrose **OR** glucagon, hydrALAZINE, ipratropium-albuterol, labetalol, lidocaine 4%, lidocaine 4%, lidocaine (buffered or not buffered), lidocaine (buffered or not buffered), - MEDICATION INSTRUCTIONS -, metoprolol, metoprolol, nitroGLYcerin, - MEDICATION INSTRUCTIONS -, ondansetron **OR** ondansetron, - MEDICATION INSTRUCTIONS -, polyethylene glycol, sodium chloride (PF), sodium chloride (PF)         Data:      All new lab and imaging data was reviewed.

## 2020-11-14 NOTE — PLAN OF CARE
Alert, disoriented to situation and place. Anxious/agitated at times. Mitt placed on left hand, effective so far. VSS on HFNC 80%/40L baseline (4L). Tele: went into afib RVR around 0045. RRT called, see other notes. Converted back to SR/ST around 0415. Special precautions for COVID+. LS diminished/crackles. Congested cough. Hept gtt infusing at 500 units/hr. Reassess need for chest CT this AM, see previous note. Purewick in place - incontinent at times, accurate I/O. NPO. A2, lift. Turn/repo. Oral cares done.

## 2020-11-14 NOTE — PROGRESS NOTES
[facility]  Infectious Disease Progress Note          Assessment and Plan:   IMPRESSION:   1.  A 79-year-old female with acute COVID-19 pneumonia, progressive respiratory insufficiency, no clear secondary or other infection.   2.  Respiratory failure due to COVID-19 despite interventions.   3.  Obesity.   4.  Chronic venous stasis disease without obvious cellulitis.   5.  Chronic obstructive lung disease.      RECOMMENDATIONS:   1.  Continue remdesivir, on day 4, steroids, anticoagulation and nonspecific respiratory and other treatments.   2.  Convalescent plasma both not available and out of favor at present, as multiple progressive studies and data suggest no benefits and currently no supply anyway, not indicated at this point.  No indication for intervening with tocilizumab based on lab profile.   3.  Agree no antibiotics for now, but low threshold to add, especially given underlying COPD, but procalcitonin low and no obvious infection.         Interval History:   no new complaints confused, no fevr no + cxs, CRp stable  LFTs and creat OK  Needing high flow O2              Medications:       aspirin  81 mg Oral Daily     atorvastatin  40 mg Oral At Bedtime     dexamethasone  6 mg Intravenous Q24H     famotidine  20 mg Intravenous Q12H     fluticasone-vilanterol  1 puff Inhalation Daily     furosemide  40 mg Intravenous Once     hypromellose-dextran  1 drop Left Eye 4x Daily     insulin aspart  1-6 Units Subcutaneous Q4H     iopamidol  78 mL Intravenous Once     levothyroxine  50 mcg Oral Daily     metoprolol  2.5 mg Intravenous Q6H     midazolam  0.5 mg Intravenous Once     PARoxetine  10 mg Oral QAM     polyethylene glycol  17 g Oral Daily     remdesivir  100 mg Intravenous Q24H     sodium chloride 0.9 %  100 mL Intravenous Once     sennosides  1 tablet Oral BID     sodium chloride  2 spray Both Nostrils TID     sodium chloride (PF)  10 mL Intravenous Once     sodium chloride (PF)  3 mL  "Intracatheter Q8H     sodium chloride (PF)  3 mL Intracatheter Q8H     umeclidinium  1 puff Inhalation Daily                  Physical Exam:   Blood pressure (!) 154/76, pulse 82, temperature 97.8  F (36.6  C), temperature source Oral, resp. rate 21, height 1.676 m (5' 6\"), weight 83.1 kg (183 lb 3.2 oz), SpO2 98 %.  Wt Readings from Last 2 Encounters:   11/14/20 83.1 kg (183 lb 3.2 oz)     Vital Signs with Ranges  Temp:  [97.7  F (36.5  C)-98.3  F (36.8  C)] 97.8  F (36.6  C)  Pulse:  [] 82  Resp:  [13-24] 21  BP: ()/(45-93) 154/76  FiO2 (%):  [60 %-80 %] 80 %  SpO2:  [92 %-100 %] 98 %    Constitutional: Awake, alert, confusede, no apparent distress   Lungs: Congestion to auscultation bilaterally, no crackles or wheezing   Cardiovascular: Regular rate and rhythm, normal S1 and S2, and no murmur noted   Abdomen: Normal bowel sounds, soft, non-distended, non-tender   Skin: No rashes, no cyanosis, no edema   Other:           Data:   All microbiology laboratory data reviewed.  Recent Labs   Lab Test 11/14/20  0528 11/13/20  1153 11/13/20  0653   WBC 7.9 5.7 7.4   HGB 11.3* 9.6* 10.4*   HCT 36.4 32.4* 33.9*   MCV 97 100 100    217 235     Recent Labs   Lab Test 11/14/20  0528 11/14/20  0133 11/13/20  0653   CR 0.68 0.61 0.58     No lab results found.  No lab results found.    Invalid input(s): JED    "

## 2020-11-14 NOTE — PROVIDER NOTIFICATION
Spoke with Dr. Garay. Chest CT ordered to r/o PE. Pt is already on heparin gtt, and still on 80%/40L HFNC and unable to transport in hallway due to being COVID+. RT will trial pt on oxymask, but if unable to tolerate, will hold Chest CT until AM and defer to AM team.     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 Will pause and adjust gtt per protocol.

## 2020-11-15 ENCOUNTER — APPOINTMENT (OUTPATIENT)
Dept: SPEECH THERAPY | Facility: CLINIC | Age: 80
DRG: 177 | End: 2020-11-15
Attending: HOSPITALIST
Payer: COMMERCIAL

## 2020-11-15 LAB
ANION GAP SERPL CALCULATED.3IONS-SCNC: 4 MMOL/L (ref 3–14)
BASOPHILS # BLD AUTO: 0 10E9/L (ref 0–0.2)
BASOPHILS NFR BLD AUTO: 0 %
BUN SERPL-MCNC: 28 MG/DL (ref 7–30)
CALCIUM SERPL-MCNC: 8.3 MG/DL (ref 8.5–10.1)
CHLORIDE SERPL-SCNC: 106 MMOL/L (ref 94–109)
CO2 SERPL-SCNC: 34 MMOL/L (ref 20–32)
CREAT SERPL-MCNC: 0.52 MG/DL (ref 0.52–1.04)
CRP SERPL-MCNC: 17.7 MG/L (ref 0–8)
D DIMER PPP FEU-MCNC: 1.5 UG/ML FEU (ref 0–0.5)
DIFFERENTIAL METHOD BLD: ABNORMAL
EOSINOPHIL # BLD AUTO: 0 10E9/L (ref 0–0.7)
EOSINOPHIL NFR BLD AUTO: 0 %
ERYTHROCYTE [DISTWIDTH] IN BLOOD BY AUTOMATED COUNT: 13.8 % (ref 10–15)
FIBRINOGEN PPP-MCNC: 452 MG/DL (ref 200–420)
GFR SERPL CREATININE-BSD FRML MDRD: >90 ML/MIN/{1.73_M2}
GLUCOSE BLDC GLUCOMTR-MCNC: 125 MG/DL (ref 70–99)
GLUCOSE BLDC GLUCOMTR-MCNC: 126 MG/DL (ref 70–99)
GLUCOSE BLDC GLUCOMTR-MCNC: 160 MG/DL (ref 70–99)
GLUCOSE BLDC GLUCOMTR-MCNC: 179 MG/DL (ref 70–99)
GLUCOSE BLDC GLUCOMTR-MCNC: 182 MG/DL (ref 70–99)
GLUCOSE BLDC GLUCOMTR-MCNC: 186 MG/DL (ref 70–99)
GLUCOSE SERPL-MCNC: 149 MG/DL (ref 70–99)
HCT VFR BLD AUTO: 34.3 % (ref 35–47)
HGB BLD-MCNC: 10.5 G/DL (ref 11.7–15.7)
INR PPP: 1.17 (ref 0.86–1.14)
LDH SERPL L TO P-CCNC: 381 U/L (ref 81–234)
LYMPHOCYTES # BLD AUTO: 0.9 10E9/L (ref 0.8–5.3)
LYMPHOCYTES NFR BLD AUTO: 13 %
MCH RBC QN AUTO: 30.2 PG (ref 26.5–33)
MCHC RBC AUTO-ENTMCNC: 30.6 G/DL (ref 31.5–36.5)
MCV RBC AUTO: 99 FL (ref 78–100)
MONOCYTES # BLD AUTO: 0.4 10E9/L (ref 0–1.3)
MONOCYTES NFR BLD AUTO: 6 %
NEUTROPHILS # BLD AUTO: 5.7 10E9/L (ref 1.6–8.3)
NEUTROPHILS NFR BLD AUTO: 81 %
NT-PROBNP SERPL-MCNC: 6162 PG/ML (ref 0–1800)
PLATELET # BLD AUTO: 297 10E9/L (ref 150–450)
PLATELET # BLD EST: ABNORMAL 10*3/UL
POTASSIUM SERPL-SCNC: 3.5 MMOL/L (ref 3.4–5.3)
RBC # BLD AUTO: 3.48 10E12/L (ref 3.8–5.2)
RBC MORPH BLD: ABNORMAL
RETICS # AUTO: 38.6 10E9/L (ref 25–95)
RETICS/RBC NFR AUTO: 1.1 % (ref 0.5–2)
SODIUM SERPL-SCNC: 144 MMOL/L (ref 133–144)
UFH PPP CHRO-ACNC: 0.17 IU/ML
UFH PPP CHRO-ACNC: 0.36 IU/ML
UFH PPP CHRO-ACNC: 0.43 IU/ML
WBC # BLD AUTO: 7 10E9/L (ref 4–11)

## 2020-11-15 PROCEDURE — 999N000157 HC STATISTIC RCP TIME EA 10 MIN

## 2020-11-15 PROCEDURE — 83880 ASSAY OF NATRIURETIC PEPTIDE: CPT | Performed by: HOSPITALIST

## 2020-11-15 PROCEDURE — 85379 FIBRIN DEGRADATION QUANT: CPT | Performed by: HOSPITALIST

## 2020-11-15 PROCEDURE — 85025 COMPLETE CBC W/AUTO DIFF WBC: CPT | Performed by: HOSPITALIST

## 2020-11-15 PROCEDURE — 80048 BASIC METABOLIC PNL TOTAL CA: CPT | Performed by: HOSPITALIST

## 2020-11-15 PROCEDURE — 85045 AUTOMATED RETICULOCYTE COUNT: CPT | Performed by: HOSPITALIST

## 2020-11-15 PROCEDURE — 999N000215 HC STATISTIC HFNC ADULT NON-CPAP

## 2020-11-15 PROCEDURE — 99233 SBSQ HOSP IP/OBS HIGH 50: CPT | Performed by: HOSPITALIST

## 2020-11-15 PROCEDURE — 36415 COLL VENOUS BLD VENIPUNCTURE: CPT | Performed by: INTERNAL MEDICINE

## 2020-11-15 PROCEDURE — 120N000013 HC R&B IMCU

## 2020-11-15 PROCEDURE — 36415 COLL VENOUS BLD VENIPUNCTURE: CPT | Performed by: HOSPITALIST

## 2020-11-15 PROCEDURE — 92610 EVALUATE SWALLOWING FUNCTION: CPT | Mod: GN | Performed by: SPEECH-LANGUAGE PATHOLOGIST

## 2020-11-15 PROCEDURE — 86140 C-REACTIVE PROTEIN: CPT | Performed by: HOSPITALIST

## 2020-11-15 PROCEDURE — 85520 HEPARIN ASSAY: CPT | Performed by: INTERNAL MEDICINE

## 2020-11-15 PROCEDURE — 120N000001 HC R&B MED SURG/OB

## 2020-11-15 PROCEDURE — 83615 LACTATE (LD) (LDH) ENZYME: CPT | Performed by: HOSPITALIST

## 2020-11-15 PROCEDURE — 85520 HEPARIN ASSAY: CPT | Performed by: HOSPITALIST

## 2020-11-15 PROCEDURE — 999N001017 HC STATISTIC GLUCOSE BY METER IP

## 2020-11-15 PROCEDURE — 92526 ORAL FUNCTION THERAPY: CPT | Mod: GN | Performed by: SPEECH-LANGUAGE PATHOLOGIST

## 2020-11-15 PROCEDURE — 85384 FIBRINOGEN ACTIVITY: CPT | Performed by: HOSPITALIST

## 2020-11-15 PROCEDURE — 250N000009 HC RX 250: Performed by: HOSPITALIST

## 2020-11-15 PROCEDURE — 250N000011 HC RX IP 250 OP 636: Performed by: HOSPITALIST

## 2020-11-15 PROCEDURE — 258N000003 HC RX IP 258 OP 636: Performed by: HOSPITALIST

## 2020-11-15 PROCEDURE — 250N000013 HC RX MED GY IP 250 OP 250 PS 637: Performed by: HOSPITALIST

## 2020-11-15 PROCEDURE — 85610 PROTHROMBIN TIME: CPT | Performed by: HOSPITALIST

## 2020-11-15 RX ORDER — FUROSEMIDE 10 MG/ML
40 INJECTION INTRAMUSCULAR; INTRAVENOUS ONCE
Status: COMPLETED | OUTPATIENT
Start: 2020-11-15 | End: 2020-11-15

## 2020-11-15 RX ADMIN — INSULIN ASPART 1 UNITS: 100 INJECTION, SOLUTION INTRAVENOUS; SUBCUTANEOUS at 04:20

## 2020-11-15 RX ADMIN — FAMOTIDINE 20 MG: 10 INJECTION INTRAVENOUS at 18:55

## 2020-11-15 RX ADMIN — Medication 2 SPRAY: at 15:48

## 2020-11-15 RX ADMIN — DEXTRAN 70, GLYCERIN, HYPROMELLOSE 1 DROP: 1; 2; 3 SOLUTION/ DROPS OPHTHALMIC at 08:50

## 2020-11-15 RX ADMIN — Medication 2 SPRAY: at 21:16

## 2020-11-15 RX ADMIN — DEXTRAN 70, GLYCERIN, HYPROMELLOSE 1 DROP: 1; 2; 3 SOLUTION/ DROPS OPHTHALMIC at 21:16

## 2020-11-15 RX ADMIN — SODIUM CHLORIDE: 9 INJECTION, SOLUTION INTRAVENOUS at 08:54

## 2020-11-15 RX ADMIN — FUROSEMIDE 40 MG: 10 INJECTION, SOLUTION INTRAVENOUS at 15:44

## 2020-11-15 RX ADMIN — UMECLIDINIUM 1 PUFF: 62.5 AEROSOL, POWDER ORAL at 08:49

## 2020-11-15 RX ADMIN — INSULIN ASPART 1 UNITS: 100 INJECTION, SOLUTION INTRAVENOUS; SUBCUTANEOUS at 12:17

## 2020-11-15 RX ADMIN — DEXTRAN 70, GLYCERIN, HYPROMELLOSE 1 DROP: 1; 2; 3 SOLUTION/ DROPS OPHTHALMIC at 12:17

## 2020-11-15 RX ADMIN — METOPROLOL TARTRATE 2.5 MG: 5 INJECTION INTRAVENOUS at 12:21

## 2020-11-15 RX ADMIN — LEVOTHYROXINE SODIUM 50 MCG: 50 TABLET ORAL at 08:47

## 2020-11-15 RX ADMIN — DEXTRAN 70, GLYCERIN, HYPROMELLOSE 1 DROP: 1; 2; 3 SOLUTION/ DROPS OPHTHALMIC at 18:46

## 2020-11-15 RX ADMIN — PAROXETINE 10 MG: 10 TABLET, FILM COATED ORAL at 08:46

## 2020-11-15 RX ADMIN — REMDESIVIR 100 MG: 100 INJECTION, POWDER, LYOPHILIZED, FOR SOLUTION INTRAVENOUS at 19:02

## 2020-11-15 RX ADMIN — METOPROLOL TARTRATE 2.5 MG: 5 INJECTION INTRAVENOUS at 01:01

## 2020-11-15 RX ADMIN — ASPIRIN 81 MG: 81 TABLET, CHEWABLE ORAL at 08:47

## 2020-11-15 RX ADMIN — HEPARIN SODIUM 550 UNITS/HR: 10000 INJECTION, SOLUTION INTRAVENOUS at 09:31

## 2020-11-15 RX ADMIN — FAMOTIDINE 20 MG: 10 INJECTION INTRAVENOUS at 08:44

## 2020-11-15 RX ADMIN — FUROSEMIDE 40 MG: 10 INJECTION, SOLUTION INTRAMUSCULAR; INTRAVENOUS at 12:25

## 2020-11-15 RX ADMIN — Medication 2 SPRAY: at 08:51

## 2020-11-15 RX ADMIN — INSULIN ASPART 1 UNITS: 100 INJECTION, SOLUTION INTRAVENOUS; SUBCUTANEOUS at 00:53

## 2020-11-15 RX ADMIN — METOPROLOL TARTRATE 2.5 MG: 5 INJECTION INTRAVENOUS at 06:43

## 2020-11-15 RX ADMIN — HEPARIN SODIUM 600 UNITS/HR: 10000 INJECTION, SOLUTION INTRAVENOUS at 10:58

## 2020-11-15 RX ADMIN — FLUTICASONE FUROATE AND VILANTEROL TRIFENATATE 1 PUFF: 200; 25 POWDER RESPIRATORY (INHALATION) at 08:50

## 2020-11-15 RX ADMIN — DEXAMETHASONE SODIUM PHOSPHATE 6 MG: 4 INJECTION, SOLUTION INTRAMUSCULAR; INTRAVENOUS at 18:19

## 2020-11-15 RX ADMIN — INSULIN ASPART 1 UNITS: 100 INJECTION, SOLUTION INTRAVENOUS; SUBCUTANEOUS at 08:43

## 2020-11-15 RX ADMIN — METOPROLOL TARTRATE 2.5 MG: 5 INJECTION INTRAVENOUS at 18:21

## 2020-11-15 RX ADMIN — ATORVASTATIN CALCIUM 40 MG: 40 TABLET, FILM COATED ORAL at 21:13

## 2020-11-15 ASSESSMENT — ACTIVITIES OF DAILY LIVING (ADL)
ADLS_ACUITY_SCORE: 23
ADLS_ACUITY_SCORE: 22
ADLS_ACUITY_SCORE: 23
ADLS_ACUITY_SCORE: 22

## 2020-11-15 ASSESSMENT — MIFFLIN-ST. JEOR: SCORE: 1306.75

## 2020-11-15 NOTE — PROGRESS NOTES
[facility]  Infectious Disease Progress Note          Assessment and Plan:   IMPRESSION:   1.  A 79-year-old female with acute COVID-19 pneumonia, progressive respiratory insufficiency, no clear secondary or other infection.   2.  Respiratory failure due to COVID-19 despite interventions.   3.  Obesity.   4.  Chronic venous stasis disease without obvious cellulitis.   5.  Chronic obstructive lung disease.      RECOMMENDATIONS:   1.  Continue remdesivir, on day 5, steroids, anticoagulation and nonspecific respiratory and other treatments.   2.  Convalescent plasma both not available and out of favor at present, as multiple progressive studies and data suggest no benefits and currently no supply anyway, not indicated at this point.  No indication for intervening with tocilizumab based on lab profile.   3.  Agree no antibiotics for now, but low threshold to add, especially given underlying COPD, but procalcitonin low and no obvious infection.    Likely extend remdesivir although labs suggest maybe mostly lung damage rather than active viral issue        Interval History:   no new complaints confused, no fevr no + cxs, CRp stable  LFTs and creat OK  Needing high flow O2 still   crp all the way down to 17              Medications:       aspirin  81 mg Oral Daily     atorvastatin  40 mg Oral At Bedtime     dexamethasone  6 mg Intravenous Q24H     famotidine  20 mg Intravenous Q12H     fluticasone-vilanterol  1 puff Inhalation Daily     hypromellose-dextran  1 drop Left Eye 4x Daily     insulin aspart  1-6 Units Subcutaneous Q4H     iopamidol  78 mL Intravenous Once     levothyroxine  50 mcg Oral Daily     metoprolol  2.5 mg Intravenous Q6H     midazolam  0.5 mg Intravenous Once     PARoxetine  10 mg Oral QAM     polyethylene glycol  17 g Oral Daily     remdesivir  100 mg Intravenous Q24H     sodium chloride 0.9 %  100 mL Intravenous Once     sennosides  1 tablet Oral BID     sodium chloride  2  "spray Both Nostrils TID     sodium chloride (PF)  10 mL Intravenous Once     sodium chloride (PF)  3 mL Intracatheter Q8H     sodium chloride (PF)  3 mL Intracatheter Q8H     umeclidinium  1 puff Inhalation Daily                  Physical Exam:   Blood pressure 132/69, pulse 75, temperature 98  F (36.7  C), temperature source Axillary, resp. rate 25, height 1.676 m (5' 6\"), weight 80.5 kg (177 lb 7.5 oz), SpO2 93 %.  Wt Readings from Last 2 Encounters:   11/15/20 80.5 kg (177 lb 7.5 oz)     Vital Signs with Ranges  Temp:  [97.5  F (36.4  C)-98.3  F (36.8  C)] 98  F (36.7  C)  Pulse:  [66-95] 75  Resp:  [12-29] 25  BP: (111-155)/(59-89) 132/69  FiO2 (%):  [65 %-90 %] 65 %  SpO2:  [93 %-98 %] 93 %    Constitutional: Awake, alert, confusede, no apparent distress   Lungs: Congestion to auscultation bilaterally, no crackles or wheezing   Cardiovascular: Regular rate and rhythm, normal S1 and S2, and no murmur noted   Abdomen: Normal bowel sounds, soft, non-distended, non-tender   Skin: No rashes, no cyanosis, no edema   Other:           Data:   All microbiology laboratory data reviewed.  Recent Labs   Lab Test 11/15/20  0630 11/14/20  0528 11/13/20  1153   WBC 7.0 7.9 5.7   HGB 10.5* 11.3* 9.6*   HCT 34.3* 36.4 32.4*   MCV 99 97 100    307 217     Recent Labs   Lab Test 11/15/20  0630 11/14/20  0528 11/14/20  0133   CR 0.52 0.68 0.61     No lab results found.  No lab results found.    Invalid input(s): JED    "

## 2020-11-15 NOTE — PROVIDER NOTIFICATION
Paged Dr. Camacho regard CT ordered to rule out PE.  -- Will hold off on CT scan until tomorrow morning in hopes pt is off HFNC & on nasal cannula. CT will follow up tomorrow morning to reassess.

## 2020-11-15 NOTE — PLAN OF CARE
COVID+. Alert to self only. Agitated at times, but slept more this shift. Mitt on left hand, effective so far, otherwise pulls O2 off. VSS on HFNC 80%/40L, baseline 4L. Tele: SR this whole shift. LS diminished/ crackles. Congested cough. Hept gtt infusing at 450 units/hr. Purewick in place. Smears of BM. Clear liquids tolerated. A2. Turn/repo.

## 2020-11-15 NOTE — PLAN OF CARE
Alert to self only.  Anxious/agitated at times; yells out at staff continually.  Mitt placed on left hand, effective so far; otherwise pulls O2 off. VSS on HFNC 80%/40L baseline (4L). Tele: went into afib RVR again this shift x 2; PRN's given;  Converted back to SR/ST.   Special precautions for COVID+. LS diminished/ crackles. Congested cough. Hept gtt infusing at 650 units/hr. Purewick in place - incontinent.  Smear of BM this shift. Clear liquids tolerated.  A2 x 2; Turn/repo.

## 2020-11-15 NOTE — PROGRESS NOTES
[facility]  Hospitalist Progress Note   11/15/2020          Assessment and Plan:       Carolina Frances is a 79 year old female with medical history significant for COPD/emphysema with chronic hypoxemic respiratory failure on 4 LPM NC O2, depression, chronic venous insufficiency, paroxysmal A. fib, hypothyroidism, oropharyngeal dysphagia admitted on 11/11/2020 from outside hospital due to lack of beds for further management.    COVID 19 pneumonia  Acute hypoxic respiratory failure requiring noninvasive mechanical ventilation-multifactorial.  COPD/emphysema with chronic hypoxemic respiratory failure on 4 LPM NC O2  Possible obstructive sleep apnea.  COVID-19 on 11/8 and that came back positive as per the report. At nursing facility when staff went to check on her, O2 saturations were in 70s. EMS put her on 15 LPM O2 but was still hypoxic.  At [facility]  VBG showed pH 7.40, PCO2 44 and PO2 75.    Procalcitonin was 0.11.  WBC 4 lactic acid 1.1.  CRP 62.86  Fibrinogen 593 D-dimer 416  Ferritin 246    lactic acid 1.1.  Chest x-ray from outside hospital- chronic upper lobe scarring and bilateral interstitial opacities.  COVID-19 PCR positive from 11/10 from [facility].    Patient was progressively hypoxic, discussed with intensivist team on 11/13 for elective intubation, given acute shortage of beds and patient compensating recommended to continue BiPAP.  Weaned off BiPAP after almost 48 hours  to high flow nasal oxygen on 11/14.  Continue high flow nasal oxygen, wean off as tolerated. We will continue to monitor under IMC status.  If any decompensation noted patient must be intubated. Appreciate RT assistance.    Continue IV remdesivir and IV dexamethasone ( 10/11).   Continue IV famotidine for GI prophylaxis while on steroids.  Procalcitonin repeat less than 0.05,  hold off antibiotics at this time.  Pulmonology following, recommend continue steroids and remdesivir.  ID following, in agreement with holding  of antibiotics at this time.  Appreciate ID, pulmonology input.  CT of the chest with contrast to evaluate for PE, pulmonary infiltrates pending and can be done later today or tomorrow.  [Patient already on IV heparin drip, to continue]  Continue PTA Advair and Incruse Ellipta  Continue PTA Combivent as needed for shortness of breath wheezing.     Elevated troponin likely in the setting of hypoxia, NSTEMI versus type II demand ischemia.  New systolic heart failure with EF of 30 to 35%.  Troponin at outside ED slightly elevated per report. POC cardiac ultrasound showed no pericardial effusion. Troponin 0.036  > peaked to 2.0 and trending down to 1.038.  Echo basal right ventricle contracts normally but the remaining walls appear at least moderately hypokinetic. This study was extremely poor and wall motion cannot be assessed on this study. Assessment of LV ejection fraction would also be highly inaccurate. An EF of 30-35% is possibly present but this may be erroneous. Technically difficult, suboptimal study.  Continue telemetry monitoring.  Has been receiving IV Lasix from 20 mg IV on 11/12, 11/13.  On 11/14 patient received total of 100 mg IV Lasix.  proBNP improved from 14,786 > 6162.  Will administer 40 mg IV Lasix twice daily today.  Continue IV heparin drip.  Cardiology following, will await recommendations today.  Appreciate comanagement.  Continue PTA aspirin, simvastatin.  Strict input output monitoring, daily weights.    Paroxysmal A. Fib with intermittent RVR.  Hypertension, hyperlipidemia.  TSH within normal limits.  Potassium 3.9, magnesium 2.6.  Has been having intermittent episodes of A. fib with RVR, RRT's and receiving IV metoprolol.  On and off sinus rhythm.  Continue IV metoprolol 2.5 mg every 6 hours, as needed IV metoprolol ordered.  Diuresis as above, EP consult requested on 11/14.  Cardiology following, last saw 11/13, await recommendations today. Appreciate co management   Continue PTA aspirin    Continue telemetry monitoring, monitor electrolytes closely.    Delirium from medical illness, hospitalization.  Concern for cognitive impairment.  Patient resident of [facility], per discussion with brother Todd he last met her few years back.  He is unclear about her baseline cognitive status.  Requested floor team to request access to care everywhere, per [facility] - ? no previous records.  Again requested to reach out to group [facility] to obtain prior medical records.  During this admission patient has been having intermittent episodes of agitation, alert to self.  Mental status slightly improving, unclear if in the setting of acute medical illness, hospitalization and isolation.  Will need OT for cognitive evaluation once medical condition stabilizes.    Nutrition  Oropharyngeal dysphagia  Discussed with patient's brother Todd on 11/13 and he would like to start tube feeds if need to.  Daughter on clear liquid diet 11/14, had speech therapy evaluation today.  Will switch to honey thickened diet with aspiration precautions.    Physical deconditioning from ongoing medical illness, obesity.  Patient resident of nursing facility.  PT     Acute anemia.  Unclear baseline.  Hemoglobin at outside ED 11.5, dropped to 9.6>10.5  Monitor hemoglobin levels.    Hypothyroidism  Continue PTA levothyroxine   TSH within normal limits.    Depression  Continue PTA Paxil    Chronic venous insufficiency  ACE wraps prn and limb elevation     Obesity with a BMI of 29.86.  Will need lifestyle modification with diet and exercise as able to.  Will need to be evaluated for sleep studies as outpatient.    Chronic constipation.  As needed bowel regimen.    Goals of care discussion   Have discussed with patient's brother Todd on 11/13 [reports he makes medical decisions for Carolina] did discuss critical illness and prognosis in the setting of multiple medical comorbid condition.  Expressed understanding regarding current clinical situation and  "recommends \"to do what needs to be done to keep going\".       DVT Prophylaxis:  Lovenox SQ  Poon Catheter: not present  Code Status:   Full code, discussed with patient's brother.  Discharge disposition in 3 to 5 days pending clinical improvement, continue [facility] status.     Discussed with patient, bedside RN.  Speech therapist.  Time greater than 35 minutes out of which greater than 60% of the time was spent with direct patient care.     Laila Camacho MD        Interval History:      Lying in bed.  On high flow nasal oxygen, weaned off to 35 L this morning.  Apparently overnight intermittent episodes of agitation.  Intermittent episodes of confusion, but has been mostly been clearing up.  Denies any chest pain at this time.  Telemetry with sinus rhythm.  Continues to be on IV heparin.  Purewick Catheter draining urine.  Continues to have shortness of breath.  Afebrile in the last 24 hours.  Tolerating clear liquid diet with intermittent coughing.  Episodes of A. fib with RVR yesterday, received 2 doses of IV metoprolol.  This morning heart rate between 80-1 10.         Physical Exam:        Physical Exam   Temp:  [97.5  F (36.4  C)-98.3  F (36.8  C)] 98  F (36.7  C)  Pulse:  [] 72  Resp:  [12-38] 18  BP: (111-155)/() 144/89  FiO2 (%):  [80 %-90 %] 80 %  SpO2:  [95 %-98 %] 95 %    Intake/Output Summary (Last 24 hours) at 11/12/2020 1257  Last data filed at 11/12/2020 0400  Gross per 24 hour   Intake 600 ml   Output --   Net 600 ml       Admission Weight: 83.9 kg (185 lb)  Current Weight: 83.9 kg (185 lb)    PHYSICAL EXAM  GENERAL up and awake  HEART:Technically difficult auscultation given obesity  LUNGS: Decreased breath sounds due to body habitus  Respirations not labored.  NEURO: Moving all extremities.  EXTREMITIES: ++pedal edema.  SKIN: Warm, dry.  PSYCHIATRY awake        Medications:          aspirin  81 mg Oral Daily     atorvastatin  40 mg Oral At Bedtime     dexamethasone  6 mg Intravenous " Q24H     famotidine  20 mg Intravenous Q12H     fluticasone-vilanterol  1 puff Inhalation Daily     hypromellose-dextran  1 drop Left Eye 4x Daily     insulin aspart  1-6 Units Subcutaneous Q4H     iopamidol  78 mL Intravenous Once     levothyroxine  50 mcg Oral Daily     metoprolol  2.5 mg Intravenous Q6H     midazolam  0.5 mg Intravenous Once     PARoxetine  10 mg Oral QAM     polyethylene glycol  17 g Oral Daily     remdesivir  100 mg Intravenous Q24H     sodium chloride 0.9 %  100 mL Intravenous Once     sennosides  1 tablet Oral BID     sodium chloride  2 spray Both Nostrils TID     sodium chloride (PF)  10 mL Intravenous Once     sodium chloride (PF)  3 mL Intracatheter Q8H     sodium chloride (PF)  3 mL Intracatheter Q8H     umeclidinium  1 puff Inhalation Daily     acetaminophen, acetaminophen, albuterol, artificial tears ophthalmic solution, glucose **OR** dextrose **OR** glucagon, hydrALAZINE, ipratropium-albuterol, labetalol, lidocaine 4%, lidocaine 4%, lidocaine (buffered or not buffered), lidocaine (buffered or not buffered), - MEDICATION INSTRUCTIONS -, metoprolol, metoprolol, metoprolol, nitroGLYcerin, - MEDICATION INSTRUCTIONS -, ondansetron **OR** ondansetron, - MEDICATION INSTRUCTIONS -, polyethylene glycol, sodium chloride (PF), sodium chloride (PF)         Data:      All new lab and imaging data was reviewed.

## 2020-11-15 NOTE — PROGRESS NOTES
11/15/20 1138   General Information   Onset of Illness/Injury or Date of Surgery 11/11/20   Referring Physician Dr. Franklin   Behavioral Issues unable/difficult to assess   Patient/Family Therapy Goal Statement (SLP) Patient is thristy.   Pertinent History of Current Problem Carolina Frances is a 79 year old female with medical history significant for COPD/emphysema with chronic hypoxemic respiratory failure on 4 LPM NC O2, depression, chronic venous insufficiency, paroxysmal A. fib, hypothyroidism, oropharyngeal dysphagia admitted on 11/11/2020 from outside hospital due to lack of beds for further management.Carolina Frances is a 79 year old female with medical history significant for COPD/emphysema with chronic hypoxemic respiratory failure on 4 LPM NC O2, depression, chronic venous insufficiency, paroxysmal A. fib, hypothyroidism, oropharyngeal dysphagia admitted on 11/11/2020 from outside hospital due to lack of beds for further management.   General Observations Alert and confused.    Past History of Dysphagia Did not find documeneted history.   Type of Evaluation   Type of Evaluation Swallow Evaluation   Oral Motor   Oral Musculature unable to assess due to poor participation/comprehension   Dentition (Oral Motor)   Dentition (Oral Motor) dental appliance/dentures   Dental Appliance/Denture (Oral Motor) upper and lower   General Swallowing Observations   Current Diet/Method of Nutritional Intake (General Swallowing Observations, NIS) clear liquid diet   Respiratory Support (General Swallowing Observations) nasal cannula  (HFNC)   Swallowing Evaluation Clinical swallow evaluation   Clinical Swallow Evaluation   Feeding Assistance dependent   Clinical Swallow Evaluation Textures Trialed Thin Liquids;Nectar-Thick Liquids;Puree Textures   Clinical Swallow Eval: Thin Liquid Texture Trial   Mode of Presentation, Thin Liquids spoon;straw;fed by clinician   Volume of Liquid or Food Presented Ice chips and water    Oral Phase of Swallow Premature pharyngeal entry   Pharyngeal Phase of Swallow impaired;coughing/choking;reduction in laryngeal movement   Diagnostic Statement Sx of aspiration especially via the straw and large bolus size.    Clinical Swallow Evaluation: Nectar-Thick Liquid Texture Trial   Mode of Presentation, Nectar spoon;fed by clinician;straw   Volume of Nectar Presented 4 oz of juice   Oral Phase, Nectar Premature pharyngeal entry   Pharyngeal Phase, Nectar impaired;reduction in laryngeal movement;repeated swallows   Diagnostic Statement Increased risk for silent aspiration via the straw with large bolus size.    Clinical Swallow Evaluation: Puree Solid Texture Trial   Mode of Presentation, Puree spoon;fed by clinician   Volume of Puree Presented apple sauce   Oral Phase, Puree Poor AP movement;Premature pharyngeal entry   Pharyngeal Phase, Puree impaired;reduction in laryngeal movement;repeated swallows   Diagnostic Statement No overt Sx of aspiration with few teaspoon amounts at slow pace.    Swallowing Recommendations   Diet Consistency Recommendations clear liquid diet;nectar-thick liquids   Supervision Level for Intake 1:1 supervision needed   Mode of Delivery Recommendations bolus size, small;liquids via spoon only;no straws;no cups;slow rate of intake   Postural Recommendations none   Swallowing Maneuver Recommendations alternate food and liquid intake   Monitoring/Assistance Required (Eating/Swallowing) stop eating activities when fatigue is present;monitor for cough or change in vocal quality with intake   Recommended Feeding/Eating Techniques (Swallow Eval) maintain upright sitting position for eating;maintain upright posture during/after eating for 30 minutes   Medication Administration Recommendations, Swallowing (SLP) Crushed or if small whole in apple sauce.   General Therapy Interventions   Planned Therapy Interventions Dysphagia Treatment   Dysphagia treatment Modified diet  education;Instruction of safe swallow strategies   SLP Therapy Assessment/Plan   Criteria for Skilled Therapeutic Interventions Met (SLP Eval) yes   SLP Diagnosis Dysphagia   Rehab Potential (SLP Eval) fair, will monitor progress closely   Therapy Frequency (SLP Eval) 5 times/wk   Predicted Duration of Therapy Intervention (SLP Eval) 1 week   Comment, Therapy Assessment/Plan (SLP) Patient presents with moderate oral and pharyngeal dysphagia at bedside. Patient was on HFNC 65% at 35 LPM. She was cooperative but confused. She was given trials of ice chips, teapoon amounts of water and via the straw. Patient with premature entry and reduced laryngeal elevation and immediate cough response after trials of thin liquids via the straw. Increased SOB noted after the trials. She was better able to tolerate nectar thick liquids by the spoon and straw, but suspect penetration via the straw due to large bolus size increasing her risk for aspiration. Mildly decreased AP movement of a pureed bolus with adequate bolus extraction and clearing. Patient is at a high risk for silent aspiration given COPD and high oxygen needs. Monitor closely and if respiratory status declines hold diet.    Therapy Plan Review/Discharge Plan (SLP)   Therapy Plan Review (SLP) evaluation/treatment results reviewed;care plan/treatment goals reviewed;risks/benefits reviewed;current/potential barriers reviewed;participants included;patient   SLP Discharge Planning    SLP Discharge Recommendation (DC Rec) Transitional Care Facility   SLP Rationale for DC Rec Patient will need continued ST needs for dysphagia management for least retrictive diet.    SLP Brief overview of current status  Patient with moderate dysphagia with impulsive behaviors increasing her risk for aspiration. Recommend: 1. downgrade to clear nectar thick liquids by the spoon with 1:1 assistance, slow pacing and verify each swallow. Hold diet if respiratory status declines.     Total  Evaluation Time   Total Evaluation Time (Minutes) 30

## 2020-11-16 LAB
ALBUMIN SERPL-MCNC: 2.9 G/DL (ref 3.4–5)
ALP SERPL-CCNC: 63 U/L (ref 40–150)
ALT SERPL W P-5'-P-CCNC: 22 U/L (ref 0–50)
ANION GAP SERPL CALCULATED.3IONS-SCNC: 4 MMOL/L (ref 3–14)
AST SERPL W P-5'-P-CCNC: 24 U/L (ref 0–45)
BILIRUB SERPL-MCNC: 0.7 MG/DL (ref 0.2–1.3)
BUN SERPL-MCNC: 24 MG/DL (ref 7–30)
CALCIUM SERPL-MCNC: 8 MG/DL (ref 8.5–10.1)
CHLORIDE SERPL-SCNC: 99 MMOL/L (ref 94–109)
CO2 SERPL-SCNC: 36 MMOL/L (ref 20–32)
CREAT SERPL-MCNC: 0.55 MG/DL (ref 0.52–1.04)
ERYTHROCYTE [DISTWIDTH] IN BLOOD BY AUTOMATED COUNT: 13.7 % (ref 10–15)
GFR SERPL CREATININE-BSD FRML MDRD: 90 ML/MIN/{1.73_M2}
GLUCOSE BLDC GLUCOMTR-MCNC: 106 MG/DL (ref 70–99)
GLUCOSE BLDC GLUCOMTR-MCNC: 111 MG/DL (ref 70–99)
GLUCOSE BLDC GLUCOMTR-MCNC: 135 MG/DL (ref 70–99)
GLUCOSE BLDC GLUCOMTR-MCNC: 143 MG/DL (ref 70–99)
GLUCOSE BLDC GLUCOMTR-MCNC: 178 MG/DL (ref 70–99)
GLUCOSE BLDC GLUCOMTR-MCNC: 182 MG/DL (ref 70–99)
GLUCOSE SERPL-MCNC: 147 MG/DL (ref 70–99)
HCT VFR BLD AUTO: 37.1 % (ref 35–47)
HGB BLD-MCNC: 11.4 G/DL (ref 11.7–15.7)
INTERPRETATION ECG - MUSE: NORMAL
MCH RBC QN AUTO: 30.2 PG (ref 26.5–33)
MCHC RBC AUTO-ENTMCNC: 30.7 G/DL (ref 31.5–36.5)
MCV RBC AUTO: 98 FL (ref 78–100)
PLATELET # BLD AUTO: 338 10E9/L (ref 150–450)
POTASSIUM SERPL-SCNC: 3.6 MMOL/L (ref 3.4–5.3)
PROT SERPL-MCNC: 6.8 G/DL (ref 6.8–8.8)
RBC # BLD AUTO: 3.77 10E12/L (ref 3.8–5.2)
SODIUM SERPL-SCNC: 139 MMOL/L (ref 133–144)
UFH PPP CHRO-ACNC: 0.4 IU/ML
WBC # BLD AUTO: 10.1 10E9/L (ref 4–11)

## 2020-11-16 PROCEDURE — 85520 HEPARIN ASSAY: CPT | Performed by: HOSPITALIST

## 2020-11-16 PROCEDURE — 99233 SBSQ HOSP IP/OBS HIGH 50: CPT | Performed by: HOSPITALIST

## 2020-11-16 PROCEDURE — 250N000013 HC RX MED GY IP 250 OP 250 PS 637: Performed by: HOSPITALIST

## 2020-11-16 PROCEDURE — 85027 COMPLETE CBC AUTOMATED: CPT | Performed by: HOSPITALIST

## 2020-11-16 PROCEDURE — 120N000001 HC R&B MED SURG/OB

## 2020-11-16 PROCEDURE — 36415 COLL VENOUS BLD VENIPUNCTURE: CPT | Performed by: HOSPITALIST

## 2020-11-16 PROCEDURE — 120N000013 HC R&B IMCU

## 2020-11-16 PROCEDURE — 80053 COMPREHEN METABOLIC PANEL: CPT | Performed by: HOSPITALIST

## 2020-11-16 PROCEDURE — 999N000215 HC STATISTIC HFNC ADULT NON-CPAP

## 2020-11-16 PROCEDURE — 250N000011 HC RX IP 250 OP 636: Performed by: HOSPITALIST

## 2020-11-16 PROCEDURE — 999N001017 HC STATISTIC GLUCOSE BY METER IP

## 2020-11-16 PROCEDURE — 250N000009 HC RX 250: Performed by: HOSPITALIST

## 2020-11-16 RX ORDER — FUROSEMIDE 10 MG/ML
40 INJECTION INTRAMUSCULAR; INTRAVENOUS ONCE
Status: COMPLETED | OUTPATIENT
Start: 2020-11-16 | End: 2020-11-16

## 2020-11-16 RX ADMIN — FUROSEMIDE 40 MG: 10 INJECTION, SOLUTION INTRAVENOUS at 16:33

## 2020-11-16 RX ADMIN — FAMOTIDINE 20 MG: 10 INJECTION INTRAVENOUS at 20:28

## 2020-11-16 RX ADMIN — FLUTICASONE FUROATE AND VILANTEROL TRIFENATATE 1 PUFF: 200; 25 POWDER RESPIRATORY (INHALATION) at 08:15

## 2020-11-16 RX ADMIN — FAMOTIDINE 20 MG: 10 INJECTION INTRAVENOUS at 08:12

## 2020-11-16 RX ADMIN — DEXTRAN 70, GLYCERIN, HYPROMELLOSE 1 DROP: 1; 2; 3 SOLUTION/ DROPS OPHTHALMIC at 18:19

## 2020-11-16 RX ADMIN — INSULIN ASPART 1 UNITS: 100 INJECTION, SOLUTION INTRAVENOUS; SUBCUTANEOUS at 08:14

## 2020-11-16 RX ADMIN — METOPROLOL TARTRATE 2.5 MG: 5 INJECTION INTRAVENOUS at 18:19

## 2020-11-16 RX ADMIN — PAROXETINE 10 MG: 10 TABLET, FILM COATED ORAL at 08:12

## 2020-11-16 RX ADMIN — METOPROLOL TARTRATE 2.5 MG: 5 INJECTION INTRAVENOUS at 12:34

## 2020-11-16 RX ADMIN — DEXTRAN 70, GLYCERIN, HYPROMELLOSE 1 DROP: 1; 2; 3 SOLUTION/ DROPS OPHTHALMIC at 08:15

## 2020-11-16 RX ADMIN — INSULIN ASPART 1 UNITS: 100 INJECTION, SOLUTION INTRAVENOUS; SUBCUTANEOUS at 01:04

## 2020-11-16 RX ADMIN — METOPROLOL TARTRATE 2.5 MG: 5 INJECTION INTRAVENOUS at 06:56

## 2020-11-16 RX ADMIN — DEXTRAN 70, GLYCERIN, HYPROMELLOSE 1 DROP: 1; 2; 3 SOLUTION/ DROPS OPHTHALMIC at 20:28

## 2020-11-16 RX ADMIN — ASPIRIN 81 MG: 81 TABLET, CHEWABLE ORAL at 08:13

## 2020-11-16 RX ADMIN — Medication 2 SPRAY: at 16:34

## 2020-11-16 RX ADMIN — METOPROLOL TARTRATE 2.5 MG: 5 INJECTION INTRAVENOUS at 01:10

## 2020-11-16 RX ADMIN — Medication 2 SPRAY: at 08:15

## 2020-11-16 RX ADMIN — UMECLIDINIUM 1 PUFF: 62.5 AEROSOL, POWDER ORAL at 08:15

## 2020-11-16 RX ADMIN — LEVOTHYROXINE SODIUM 50 MCG: 50 TABLET ORAL at 06:56

## 2020-11-16 RX ADMIN — DEXTRAN 70, GLYCERIN, HYPROMELLOSE 1 DROP: 1; 2; 3 SOLUTION/ DROPS OPHTHALMIC at 12:35

## 2020-11-16 RX ADMIN — Medication 2 SPRAY: at 20:28

## 2020-11-16 RX ADMIN — DEXAMETHASONE SODIUM PHOSPHATE 6 MG: 4 INJECTION, SOLUTION INTRAMUSCULAR; INTRAVENOUS at 18:18

## 2020-11-16 RX ADMIN — HEPARIN SODIUM 600 UNITS/HR: 10000 INJECTION, SOLUTION INTRAVENOUS at 14:32

## 2020-11-16 RX ADMIN — ATORVASTATIN CALCIUM 40 MG: 40 TABLET, FILM COATED ORAL at 20:27

## 2020-11-16 ASSESSMENT — ACTIVITIES OF DAILY LIVING (ADL)
ADLS_ACUITY_SCORE: 22

## 2020-11-16 ASSESSMENT — MIFFLIN-ST. JEOR: SCORE: 1289.75

## 2020-11-16 NOTE — PROGRESS NOTES
[facility]  Hospitalist Progress Note   11/16/2020          Assessment and Plan:       Carolina Frances is a 79 year old female with medical history significant for COPD/emphysema with chronic hypoxemic respiratory failure on 4 LPM NC O2, depression, chronic venous insufficiency, paroxysmal A. fib, hypothyroidism, oropharyngeal dysphagia admitted on 11/11/2020 from outside hospital due to lack of beds for further management.    COVID 19 pneumonia  Acute hypoxic respiratory failure s/p noninvasive mechanical ventilation-multifactorial.  COPD/emphysema  Chronic hypoxemic respiratory failure on 4 LPM NC O2  Possible obstructive sleep apnea.  COVID-19 on 11/8 came back positive as per report. At nursing facility when staff went to check on her, O2 saturations were in 70s. EMS put her on 15 LPM O2 but was still hypoxic.  At [facility]  VBG showed pH 7.40, PCO2 44 and PO2 75.    Procalcitonin was 0.11.  WBC 4 lactic acid 1.1.  CRP 62.86  Fibrinogen 593 D-dimer 416  Ferritin 246    lactic acid 1.1.  Chest x-ray from outside hospital- chronic upper lobe scarring and bilateral interstitial opacities.  COVID-19 PCR positive from 11/10 from [facility].    Patient was progressively hypoxic, discussed with intensivist team on 11/13 for elective intubation, given acute shortage of beds and patient compensating recommended to continue BiPAP. Subsequently we were able to wean off BiPAP after almost 48 hours  to high flow nasal oxygen on 11/14.  Continue high flow nasal oxygen, wean off as tolerated. We will continue to monitor under IMC status.  If any decompensation noted patient must be intubated.    Continue IV remdesivir and IV dexamethasone ( 10/11).   Continue IV famotidine for GI prophylaxis while on steroids.  Procalcitonin repeat less than 0.05,  hold off antibiotics at this time.  Pulmonology following, recommend continue steroids and remdesivir.  ID following, in agreement with holding of antibiotics at this  time.  Appreciate ID, pulmonology input.  CT of the chest with contrast to evaluate for PE pending, [Patient already on IV heparin drip, to continue]  Continue PTA Advair and Incruse Ellipta  Continue PTA Combivent as needed for shortness of breath, wheezing.     Elevated troponin likely in the setting of hypoxia, NSTEMI versus type II demand ischemia.  New systolic heart failure with EF of 30 to 35%.  Troponin at outside ED slightly elevated per report. POC cardiac ultrasound showed no pericardial effusion. Troponin 0.036  > peaked to 2.0 and trending down to 1.038.  Echo basal right ventricle contracts normally but the remaining walls appear at least moderately hypokinetic. This study was extremely poor and wall motion cannot be assessed on this study. Assessment of LV ejection fraction would also be highly inaccurate. An EF of 30-35% is possibly present but this may be erroneous. Technically difficult, suboptimal study.  Continue telemetry monitoring.  Has been receiving IV Lasix from 20 mg IV on 11/12, 11/13.    On 11/14 patient received total of 100 mg IV Lasix.  proBNP improved from 14,786 > 6162.  On 40 mg IV Lasix twice daily on ( 11/15), will repeat 40 mg iv lasix today.  Check proBNP a.m.  Continue IV heparin drip.  Cardiology following, [have not seen patient over the weekend]. will await recommendations today. Appreciate comanagement.  Will repeat echocardiogram today  Continue PTA aspirin, simvastatin.  Strict input output monitoring, daily weights.    Paroxysmal A. Fib with intermittent RVR.  Hypertension, hyperlipidemia.  TSH within normal limits.  Potassium 3.9, magnesium 2.6.  Has been having intermittent episodes of A. fib with RVR, RRT's and receiving IV metoprolol.  On and off sinus rhythm.  Continue IV metoprolol 2.5 mg every 6 hours, as needed IV metoprolol ordered.  Continue PTA aspirin, IV heparin as above.  EP consult requested on 11/14.  awaiting recommendation.  Cardiology following, last  saw 11/13, await recommendations today. Appreciate co management   Will repeat echocardiogram today   Continue telemetry monitoring, monitor electrolytes closely.    Delirium from medical illness, hospitalization.  Concern for cognitive impairment.  Patient resident of senior care, per discussion with brother Todd he last met her few years back.  He is unclear about her baseline cognitive status.  Requested floor team to request access to care everywhere, per [facility] - ? no previous records.  Again requested floor team today to reach out to group home, obtain access to records.  During this admission patient has been having intermittent episodes of agitation, alert to self.  Mental status slightly improving, unclear if in the setting of acute medical illness, hospitalization and isolation.  Will need OT for cognitive evaluation once medical condition stabilizes.    Nutrition  Oropharyngeal dysphagia  Discussed with patient's brother Todd on 11/13 and he would like to start tube feeds if need to.  Speech Therapy following, started on liquid diet on 11/14, advance gradually as tolerated with aspiration precaution.    Physical deconditioning from ongoing medical illness, obesity.  Patient resident of nursing facility.  PT     Acute anemia.  Unclear baseline.  Hemoglobin at outside ED 11.5, dropped to 9.6>10.5  Monitor hemoglobin levels.    Hypothyroidism  Continue PTA levothyroxine   TSH within normal limits.    Depression  Continue PTA Paxil    Chronic venous insufficiency  ACE wraps prn and limb elevation     Obesity with a BMI of 29.86.  Will need lifestyle modification with diet and exercise as able to.  Will need to be evaluated for sleep studies as outpatient.    Chronic constipation.  As needed bowel regimen.    Goals of care discussion   Have discussed with patient's brother Todd on 11/13 [reports he makes medical decisions for Carolina] did discuss critical illness and prognosis in the setting of multiple medical  "comorbid condition.  Expressed understanding regarding current clinical situation and recommends \"to do what needs to be done to keep going\".       DVT Prophylaxis:  Lovenox SQ  Poon Catheter: not present  Code Status:   Full code, discussed with patient's brother.  Discharge disposition >3 days pending clinical improvement, continue IMC status.     Discussed with patient, charge RN  Time greater than 35 minutes out of which greater than 60% of the time was spent with direct patient care.     Laila Camacho MD        Interval History:      Lying in bed.  On high flow nasal oxygen  Apparently overnight intermittent episodes of agitation.  Intermittent episodes of confusion, but has been mostly been clearing up.  Denies any chest pain at this time.  Telemetry with sinus rhythm.  Continues to be on IV heparin.  Adequate urine output with intravenous Lasix.  Purewick catheter draining urine.  Continues to have shortness of breath.  Afebrile in the last 24 hours.         Physical Exam:        Physical Exam   Temp:  [97.8  F (36.6  C)-98.8  F (37.1  C)] 98.8  F (37.1  C)  Pulse:  [68-95] 68  Resp:  [10-27] 16  BP: (112-142)/(61-80) 124/72  FiO2 (%):  [65 %-70 %] 70 %  SpO2:  [87 %-98 %] 97 %    Intake/Output Summary (Last 24 hours) at 11/12/2020 1257  Last data filed at 11/12/2020 0400  Gross per 24 hour   Intake 600 ml   Output --   Net 600 ml       Admission Weight: 83.9 kg (185 lb)  Current Weight: 83.9 kg (185 lb)    PHYSICAL EXAM  GENERAL up and awake  HEART:Technically difficult auscultation given obesity  LUNGS: Technically difficult auscultation given obesity  Respirations not labored.  NEURO: Moving all extremities.  EXTREMITIES: ++ pedal edema.  SKIN: Warm, dry.  PSYCHIATRY awake        Medications:          aspirin  81 mg Oral Daily     atorvastatin  40 mg Oral At Bedtime     dexamethasone  6 mg Intravenous Q24H     famotidine  20 mg Intravenous Q12H     fluticasone-vilanterol  1 puff Inhalation Daily     " hypromellose-dextran  1 drop Left Eye 4x Daily     insulin aspart  1-6 Units Subcutaneous Q4H     iopamidol  78 mL Intravenous Once     levothyroxine  50 mcg Oral Daily     metoprolol  2.5 mg Intravenous Q6H     midazolam  0.5 mg Intravenous Once     PARoxetine  10 mg Oral QAM     polyethylene glycol  17 g Oral Daily     sodium chloride 0.9 %  100 mL Intravenous Once     sennosides  1 tablet Oral BID     sodium chloride  2 spray Both Nostrils TID     sodium chloride (PF)  10 mL Intravenous Once     sodium chloride (PF)  3 mL Intracatheter Q8H     sodium chloride (PF)  3 mL Intracatheter Q8H     umeclidinium  1 puff Inhalation Daily     acetaminophen, acetaminophen, albuterol, artificial tears ophthalmic solution, glucose **OR** dextrose **OR** glucagon, hydrALAZINE, ipratropium-albuterol, labetalol, lidocaine 4%, lidocaine 4%, lidocaine (buffered or not buffered), lidocaine (buffered or not buffered), - MEDICATION INSTRUCTIONS -, metoprolol, metoprolol, metoprolol, nitroGLYcerin, - MEDICATION INSTRUCTIONS -, ondansetron **OR** ondansetron, - MEDICATION INSTRUCTIONS -, polyethylene glycol, sodium chloride (PF), sodium chloride (PF)         Data:      All new lab and imaging data was reviewed.

## 2020-11-16 NOTE — CONSULTS
Care Management Initial Consult    General Information  Assessment completed with: Caregiver;VM-chart review, Carolina Garcia RN   Type of CM/SW Visit: Chart Assessment  Primary Care Provider verified and updated as needed: Yes(Dr. Daly)   Readmission within the last 30 days: no previous admission in last 30 days      Reason for Consult: care coordination/care conference  Advance Care Planning: Advance Care Planning Reviewed: other (comment)(discussed with care facility RN, no documents there either)          Communication Assessment  Patient's communication style: spoken language (English or Bilingual)    Hearing Difficulty or Deaf: no   Wear Glasses or Blind: no    Cognitive  Cognitive/Neuro/Behavioral: (did not assess disoreinted by chart review,)  Level of Consciousness: confused  Arousal Level: opens eyes spontaneously  Orientation: disoriented to;situation  Mood/Behavior: restless  Best Language: 1 - Mild to moderate  Speech: spontaneous    Living Environment:   People in home: facility resident     Current living Arrangements: extended care facility(per Carolina RN , lives in Long Term Care)      Able to return to prior arrangements: yes       Family/Social Support:  Care provided by: other (see comments)  Provides care for: no one, unable/limited ability to care for self(per staff at [place]'s patient is total care, christo lift)     Sibling(s)(Patrice brother is contact)          Description of Support System:           Current Resources:   Skilled Home Care Services:    Community Resources:    Equipment currently used at home: lift device;hospital bed;grab bar, tub/shower;dressing device;walker, standard  Supplies currently used at home:      Employment/Financial:  Employment Status: other (see comments)(lives in care facility)        Financial Concerns:             Lifestyle & Psychosocial Needs:        Socioeconomic History     Marital status: Single     Spouse name: Not on file     Number of  children: Not on file     Years of education: Not on file     Highest education level: Not on file          Functional Status:  Prior to admission patient needed assistance:              Mental Health Status:          Chemical Dependency Status:                Values/Beliefs:  Spiritual, Cultural Beliefs, Christian Practices, Values that affect care: other (see comments)(unable to assess)               Additional Information:  Anticipated to return to her care facility at discharge. Attempting to obtain more medical records per MD request.     Linda Arora RN   [facility]   Phone [phone]

## 2020-11-16 NOTE — PLAN OF CARE
Disoriented to time and place, agitated with cares, very forgetful, [place]. VSS ex High- Flow oxygen 35L 70%. LS with expiratory wheezing and coarse, frequent congested cough. Tele SR. Brace to RUE, bilateral hands contracted. L hand wound and coccyx foam CDI. Total care, A2 t/r q2hrs. Purewick in place with adequate UOP. Clear nectar diet, total care. IVF Hep gtt and one SL. BG q4, 178, 135. Discharge pending 3-5 days. Continue to monitor.

## 2020-11-16 NOTE — PROGRESS NOTES
Called patient's brother Patrice to discuss GISELLA for care everywhere. Patient gave verbal consent to do query with [facility] facilities. Co witness was Rebecca Woody RN. Patient also gave me verbal consent to discuss with staff at [facility] regarding her care and previous level of activity.   Call out to [facility] and unable to take call now will call back.     5413 I called and spoke to Carolina Garcia RN at [facility] at phone [phone]. Per Carolina, patient is normally appropriate and conversant to the situation. She feels can be sometimes forgetful and some slight paranoia. At baseline patient is at 2L of oxygen, total cares and uses christo transfer. Carolina states she does not get out of bed much. For further medical records from [facility] ok to call Marie at [phone]. Primary care MD Dr. Daly's office is [phone].     I called [facility] medical records and IT to help with care everywhere query. Awaiting call back from IT.

## 2020-11-16 NOTE — PROGRESS NOTES
"PULMONOLOGY PROGRESS NOTE    Date of Admission: 11/11/2020    CC/Reason for Hospital visit: COPD, COVID-19 pneumonia  SUBJECTIVE      No new events overnight,  Afebrile. No worsening respiratory complaints at this time. Patient on HFNC overnight.    ROS: A Problem Pertinent review of systems was negative except for items noted in HPI.  Past Medical, Family, and Social/Substance History has been reviewed: No interval changes.    OBJECTIVE   Vital signs:  Temp: 98.8  F (37.1  C) Temp src: Oral BP: 124/72 Pulse: 68   Resp: 16 SpO2: 97 % O2 Device: High Flow Nasal Cannula (HFNC) Oxygen Delivery: 35 LPM Height: 167.6 cm (5' 6\") Weight: 78.8 kg (173 lb 11.6 oz)  Estimated body mass index is 28.04 kg/m  as calculated from the following:    Height as of this encounter: 1.676 m (5' 6\").    Weight as of this encounter: 78.8 kg (173 lb 11.6 oz).      I/O last 3 completed shifts:  In: -   Out: 750 [Urine:750]      *Due to the current COVID-19 pandemic, with need to conserve PPE and minimize non-essential exposure to patients diagnosed or under investigation, a limited examination was performed on this patient. Interview was done via patient's hospital room telephone, and patient was visualized through hospital door window. Attending provider's physical exam findings were noted*      LABORATORY ASSESSMENT    Arterial Blood Gas  Recent Labs   Lab 11/13/20  0653 11/12/20  1330   PH  --  7.22*   PCO2  --  69*   PO2  --  71*   HCO3  --  28   O2PER 60 percent  50%     CBC  Recent Labs   Lab 11/16/20  0734 11/15/20  0630 11/14/20  0528 11/13/20  1153   WBC 10.1 7.0 7.9 5.7   RBC 3.77* 3.48* 3.75* 3.25*   HGB 11.4* 10.5* 11.3* 9.6*   HCT 37.1 34.3* 36.4 32.4*   MCV 98 99 97 100   MCH 30.2 30.2 30.1 29.5   MCHC 30.7* 30.6* 31.0* 29.6*   RDW 13.7 13.8 14.0 14.1    297 307 217     BMP  Recent Labs   Lab 11/16/20  0734 11/15/20  0630 11/14/20  0528 11/14/20  0133    144 141 143   POTASSIUM 3.6 3.5 3.9 3.5   CHLORIDE 99 106 104 " 108   YAMINI 8.0* 8.3* 8.8 7.8*   CO2 36* 34* 34* 30   BUN 24 28 29 28   CR 0.55 0.52 0.68 0.61   * 149* 183* 183*     INR  Recent Labs   Lab 11/15/20  0630 11/14/20  0528 11/13/20  0653 11/12/20  0630   INR 1.17* 1.18* 1.11 1.01      BNPNo lab results found in last 7 days.  VENOUS BLOOD GASES  Recent Labs   Lab 11/13/20  0653   PHV 7.35   PCO2V 58*   PO2V 44   HCO3V 32*   ENRIQUE 4.9       Additional labs and/or comments:    IMAGING      CXR 11/12 -  IMPRESSION: Coarse interstitial infiltrates in the upper lungs and  left lower lobe. Without a comparison examination, difficult to know  how much of the opacity is acute versus chronic. Lungs appear  hyperinflated. Heart size normal. No pleural effusion or pneumothorax.  Chronic fracture deformity of the right humerus partially visualized.    PFT & OTHER TESTING       ASSESSMENT / PLAN      Pulmonary Diagnoses:  Abnl CT/CXR R91.8  COPD J44.9  COVID-19 U07.1  Hypoxemia R09.02  Resp fail acute J96.00  Resp fail chronic J96.10     Additional COVID-19 diagnoses:  Pneumonia due to confirmed COVID-19 J12.89, U07.1    ASSESSMENT: 79-year-old obese female nursing home resident with multiple medical problems including severe oxygen dependent COPD admitted for worsening hypoxemia secondary to COVID-19 pneumonia.  Chest x-ray on admission showed coarse interstitial infiltrates in the upper lobes and left lower lobe with hyperinflated lung fields.  Patient was treated with BiPAP and started on remdesivir and Decadron.  Patient has been transitioned to high flow nasal cannula.  Would continue present supportive treatment for now.    PLAN:  1. Adjust oxygen, keep SaO2 > 90%  2. BiPAP prn.  3. Bronchodilators - Breo 200/25, Incruse  4. Remdesivir per ID.  5. Steroids - Continue Decadron.  6. Physical activity as tolerated  7. We will continue to follow  8. The patient's room phone number is [phone]  9. Please call if questions    Romaine Torres M.D.  Pulmonary, Critical Care  and Sleep Medicine  [facility]  Office:[phone]

## 2020-11-16 NOTE — PLAN OF CARE
COVID +, Disoriented to time & place. Very forgetful. VSS ex on HFNC @ 80% & 40L. Lung sounds diminished/ crackles. Congested nonproductive cough. Bowel sounds active, +large BM. Purewick in place w/ adequate urine output. Right arm contracted, brace in place. Brice on R hand, or else pulls off O2. Assist x2 turn @ repo. Speech evaluated today tolerating nectar thicken liquids. Denies pain. Heparin gtt running @ 600 units. Q4hrs blood sugars.

## 2020-11-17 ENCOUNTER — APPOINTMENT (OUTPATIENT)
Dept: CARDIOLOGY | Facility: CLINIC | Age: 80
DRG: 177 | End: 2020-11-17
Attending: HOSPITALIST
Payer: COMMERCIAL

## 2020-11-17 ENCOUNTER — APPOINTMENT (OUTPATIENT)
Dept: SPEECH THERAPY | Facility: CLINIC | Age: 80
DRG: 177 | End: 2020-11-17
Attending: INTERNAL MEDICINE
Payer: COMMERCIAL

## 2020-11-17 LAB
ANION GAP SERPL CALCULATED.3IONS-SCNC: 4 MMOL/L (ref 3–14)
BUN SERPL-MCNC: 22 MG/DL (ref 7–30)
CALCIUM SERPL-MCNC: 7.9 MG/DL (ref 8.5–10.1)
CHLORIDE SERPL-SCNC: 95 MMOL/L (ref 94–109)
CO2 SERPL-SCNC: 36 MMOL/L (ref 20–32)
CREAT SERPL-MCNC: 0.49 MG/DL (ref 0.52–1.04)
GFR SERPL CREATININE-BSD FRML MDRD: >90 ML/MIN/{1.73_M2}
GLUCOSE BLDC GLUCOMTR-MCNC: 133 MG/DL (ref 70–99)
GLUCOSE BLDC GLUCOMTR-MCNC: 137 MG/DL (ref 70–99)
GLUCOSE BLDC GLUCOMTR-MCNC: 158 MG/DL (ref 70–99)
GLUCOSE BLDC GLUCOMTR-MCNC: 176 MG/DL (ref 70–99)
GLUCOSE BLDC GLUCOMTR-MCNC: 201 MG/DL (ref 70–99)
GLUCOSE SERPL-MCNC: 145 MG/DL (ref 70–99)
INR PPP: 1.13 (ref 0.86–1.14)
NT-PROBNP SERPL-MCNC: 1301 PG/ML (ref 0–1800)
POTASSIUM SERPL-SCNC: 3.8 MMOL/L (ref 3.4–5.3)
SODIUM SERPL-SCNC: 135 MMOL/L (ref 133–144)
TROPONIN I SERPL-MCNC: 0.07 UG/L (ref 0–0.04)
UFH PPP CHRO-ACNC: 0.23 IU/ML
UFH PPP CHRO-ACNC: 0.8 IU/ML

## 2020-11-17 PROCEDURE — 92526 ORAL FUNCTION THERAPY: CPT | Mod: GN

## 2020-11-17 PROCEDURE — 80048 BASIC METABOLIC PNL TOTAL CA: CPT | Performed by: HOSPITALIST

## 2020-11-17 PROCEDURE — 36415 COLL VENOUS BLD VENIPUNCTURE: CPT | Performed by: HOSPITALIST

## 2020-11-17 PROCEDURE — 258N000003 HC RX IP 258 OP 636: Performed by: HOSPITALIST

## 2020-11-17 PROCEDURE — 85520 HEPARIN ASSAY: CPT | Performed by: HOSPITALIST

## 2020-11-17 PROCEDURE — 250N000009 HC RX 250: Performed by: HOSPITALIST

## 2020-11-17 PROCEDURE — 93308 TTE F-UP OR LMTD: CPT | Mod: 26 | Performed by: INTERNAL MEDICINE

## 2020-11-17 PROCEDURE — 250N000011 HC RX IP 250 OP 636: Performed by: HOSPITALIST

## 2020-11-17 PROCEDURE — 999N001017 HC STATISTIC GLUCOSE BY METER IP

## 2020-11-17 PROCEDURE — 999N000157 HC STATISTIC RCP TIME EA 10 MIN

## 2020-11-17 PROCEDURE — 93325 DOPPLER ECHO COLOR FLOW MAPG: CPT | Mod: 26 | Performed by: INTERNAL MEDICINE

## 2020-11-17 PROCEDURE — 85610 PROTHROMBIN TIME: CPT | Performed by: HOSPITALIST

## 2020-11-17 PROCEDURE — 94799 UNLISTED PULMONARY SVC/PX: CPT

## 2020-11-17 PROCEDURE — 99233 SBSQ HOSP IP/OBS HIGH 50: CPT | Performed by: INTERNAL MEDICINE

## 2020-11-17 PROCEDURE — 83880 ASSAY OF NATRIURETIC PEPTIDE: CPT | Performed by: HOSPITALIST

## 2020-11-17 PROCEDURE — 999N000215 HC STATISTIC HFNC ADULT NON-CPAP

## 2020-11-17 PROCEDURE — 36415 COLL VENOUS BLD VENIPUNCTURE: CPT | Performed by: INTERNAL MEDICINE

## 2020-11-17 PROCEDURE — 250N000013 HC RX MED GY IP 250 OP 250 PS 637: Performed by: HOSPITALIST

## 2020-11-17 PROCEDURE — 93325 DOPPLER ECHO COLOR FLOW MAPG: CPT

## 2020-11-17 PROCEDURE — 255N000002 HC RX 255 OP 636: Performed by: HOSPITALIST

## 2020-11-17 PROCEDURE — 85520 HEPARIN ASSAY: CPT | Performed by: INTERNAL MEDICINE

## 2020-11-17 PROCEDURE — 93321 DOPPLER ECHO F-UP/LMTD STD: CPT | Mod: 26 | Performed by: INTERNAL MEDICINE

## 2020-11-17 PROCEDURE — 84484 ASSAY OF TROPONIN QUANT: CPT | Performed by: HOSPITALIST

## 2020-11-17 PROCEDURE — 120N000001 HC R&B MED SURG/OB

## 2020-11-17 PROCEDURE — 120N000013 HC R&B IMCU

## 2020-11-17 RX ADMIN — DEXAMETHASONE SODIUM PHOSPHATE 6 MG: 4 INJECTION, SOLUTION INTRAMUSCULAR; INTRAVENOUS at 19:01

## 2020-11-17 RX ADMIN — Medication 2 SPRAY: at 15:48

## 2020-11-17 RX ADMIN — FLUTICASONE FUROATE AND VILANTEROL TRIFENATATE 1 PUFF: 200; 25 POWDER RESPIRATORY (INHALATION) at 09:21

## 2020-11-17 RX ADMIN — METOPROLOL TARTRATE 2.5 MG: 5 INJECTION INTRAVENOUS at 06:04

## 2020-11-17 RX ADMIN — DEXTRAN 70, GLYCERIN, HYPROMELLOSE 1 DROP: 1; 2; 3 SOLUTION/ DROPS OPHTHALMIC at 22:14

## 2020-11-17 RX ADMIN — PAROXETINE 10 MG: 10 TABLET, FILM COATED ORAL at 09:10

## 2020-11-17 RX ADMIN — HUMAN ALBUMIN MICROSPHERES AND PERFLUTREN 9 ML: 10; .22 INJECTION, SOLUTION INTRAVENOUS at 12:13

## 2020-11-17 RX ADMIN — ASPIRIN 81 MG: 81 TABLET, CHEWABLE ORAL at 09:10

## 2020-11-17 RX ADMIN — Medication 2 SPRAY: at 09:23

## 2020-11-17 RX ADMIN — INSULIN ASPART 1 UNITS: 100 INJECTION, SOLUTION INTRAVENOUS; SUBCUTANEOUS at 03:53

## 2020-11-17 RX ADMIN — INSULIN ASPART 1 UNITS: 100 INJECTION, SOLUTION INTRAVENOUS; SUBCUTANEOUS at 09:11

## 2020-11-17 RX ADMIN — METOPROLOL TARTRATE 2.5 MG: 5 INJECTION INTRAVENOUS at 11:58

## 2020-11-17 RX ADMIN — METOPROLOL TARTRATE 2.5 MG: 5 INJECTION INTRAVENOUS at 01:24

## 2020-11-17 RX ADMIN — FAMOTIDINE 20 MG: 10 INJECTION INTRAVENOUS at 19:03

## 2020-11-17 RX ADMIN — HEPARIN SODIUM 450 UNITS/HR: 10000 INJECTION, SOLUTION INTRAVENOUS at 16:47

## 2020-11-17 RX ADMIN — INSULIN ASPART 1 UNITS: 100 INJECTION, SOLUTION INTRAVENOUS; SUBCUTANEOUS at 20:49

## 2020-11-17 RX ADMIN — ATORVASTATIN CALCIUM 40 MG: 40 TABLET, FILM COATED ORAL at 22:13

## 2020-11-17 RX ADMIN — METOPROLOL TARTRATE 2.5 MG: 5 INJECTION INTRAVENOUS at 18:58

## 2020-11-17 RX ADMIN — DEXTRAN 70, GLYCERIN, HYPROMELLOSE 1 DROP: 1; 2; 3 SOLUTION/ DROPS OPHTHALMIC at 09:19

## 2020-11-17 RX ADMIN — INSULIN ASPART 2 UNITS: 100 INJECTION, SOLUTION INTRAVENOUS; SUBCUTANEOUS at 01:24

## 2020-11-17 RX ADMIN — LEVOTHYROXINE SODIUM 50 MCG: 50 TABLET ORAL at 09:09

## 2020-11-17 RX ADMIN — Medication 2 SPRAY: at 22:14

## 2020-11-17 RX ADMIN — FAMOTIDINE 20 MG: 10 INJECTION INTRAVENOUS at 09:17

## 2020-11-17 RX ADMIN — DEXTRAN 70, GLYCERIN, HYPROMELLOSE 1 DROP: 1; 2; 3 SOLUTION/ DROPS OPHTHALMIC at 12:08

## 2020-11-17 RX ADMIN — HEPARIN SODIUM 750 UNITS/HR: 10000 INJECTION, SOLUTION INTRAVENOUS at 09:07

## 2020-11-17 RX ADMIN — SODIUM CHLORIDE: 9 INJECTION, SOLUTION INTRAVENOUS at 09:10

## 2020-11-17 RX ADMIN — UMECLIDINIUM 1 PUFF: 62.5 AEROSOL, POWDER ORAL at 09:22

## 2020-11-17 RX ADMIN — STANDARDIZED SENNA CONCENTRATE 1 TABLET: 8.6 TABLET ORAL at 22:13

## 2020-11-17 RX ADMIN — DEXTRAN 70, GLYCERIN, HYPROMELLOSE 1 DROP: 1; 2; 3 SOLUTION/ DROPS OPHTHALMIC at 18:58

## 2020-11-17 ASSESSMENT — ACTIVITIES OF DAILY LIVING (ADL)
ADLS_ACUITY_SCORE: 20

## 2020-11-17 NOTE — PLAN OF CARE
A&Ox2-3. Forgetful. VSS on HFNC. Denies pain, SOB. SIEGEL noted. LS dim. Tele SR. T/R q2h. , 158. Covered as ordered. Heparin gtt at 600 units/hr. Recheck this AM. Incontinent of Bowel and bladder. Purewick in place. Discharge pending progress.

## 2020-11-17 NOTE — PROGRESS NOTES
Nursing [id number]: Uneventful shift. No acute changes. Drowsy for most part. Arousal to voice. Denies pain. Inc of B&B. BGMs stable. Heparin infusing @ 600 units/hr. No s&s of bleeding. Still on supplemental HFNC oxygen @ 70%. Lungs diminished. No wheezing. No fever. Tele: NSR.

## 2020-11-17 NOTE — PROGRESS NOTES
[facility]  Infectious Disease Progress Note          Assessment and Plan:   IMPRESSION:   1.  A 79-year-old female with acute COVID-19 pneumonia, progressive respiratory insufficiency, no clear secondary or other infection.   2.  Respiratory failure due to COVID-19 despite interventions.   3.  Obesity.   4.  Chronic venous stasis disease without obvious cellulitis.   5.  Chronic obstructive lung disease.      RECOMMENDATIONS:   1.  Continue remdesivir, on day 5, steroids, anticoagulation and nonspecific respiratory and other treatments.   2.  Convalescent plasma both not available and out of favor at present, as multiple progressive studies and data suggest no benefits and currently no supply anyway, not indicated at this point.  No indication for intervening with tocilizumab based on lab profile.   3.  Agree no antibiotics for now, but low threshold to add, especially given underlying COPD, but procalcitonin low and no obvious infection.   Conceivably extend remdesivir although labs etc suggest maybe mostly at lung damage rather than active viral issue, discontinue remdesivir   still sleepy, ill, high flow O2        Interval History:   no new complaints confused, no fevr no + cxs, CRp stable  LFTs and creat OK  Needing high flow O2 still   crp all the way down to 17               Medications:       aspirin  81 mg Oral Daily     atorvastatin  40 mg Oral At Bedtime     dexamethasone  6 mg Intravenous Q24H     famotidine  20 mg Intravenous Q12H     fluticasone-vilanterol  1 puff Inhalation Daily     hypromellose-dextran  1 drop Left Eye 4x Daily     insulin aspart  1-6 Units Subcutaneous Q4H     iopamidol  78 mL Intravenous Once     levothyroxine  50 mcg Oral Daily     metoprolol  2.5 mg Intravenous Q6H     midazolam  0.5 mg Intravenous Once     PARoxetine  10 mg Oral QAM     polyethylene glycol  17 g Oral Daily     sodium chloride 0.9 %  100 mL Intravenous Once     sennosides  1 tablet Oral  "BID     sodium chloride  2 spray Both Nostrils TID     sodium chloride (PF)  10 mL Intravenous Once     sodium chloride (PF)  3 mL Intracatheter Q8H     sodium chloride (PF)  3 mL Intracatheter Q8H     umeclidinium  1 puff Inhalation Daily                  Physical Exam:   Blood pressure 108/54, pulse 81, temperature 98.2  F (36.8  C), temperature source Oral, resp. rate 17, height 1.676 m (5' 6\"), weight 78.8 kg (173 lb 11.6 oz), SpO2 (!) 88 %.  Wt Readings from Last 2 Encounters:   11/16/20 78.8 kg (173 lb 11.6 oz)     Vital Signs with Ranges  Temp:  [98.2  F (36.8  C)-98.4  F (36.9  C)] 98.2  F (36.8  C)  Pulse:  [65-81] 81  Resp:  [10-25] 17  BP: (103-135)/(53-76) 108/54  FiO2 (%):  [70 %] 70 %  SpO2:  [88 %-100 %] 88 %    Constitutional: Awake, alert, confusede, no apparent distress   Lungs: Congestion to auscultation bilaterally, no crackles or wheezing   Cardiovascular: Regular rate and rhythm, normal S1 and S2, and no murmur noted   Abdomen: Normal bowel sounds, soft, non-distended, non-tender   Skin: No rashes, no cyanosis, no edema   Other:           Data:   All microbiology laboratory data reviewed.  Recent Labs   Lab Test 11/16/20  0734 11/15/20  0630 11/14/20  0528   WBC 10.1 7.0 7.9   HGB 11.4* 10.5* 11.3*   HCT 37.1 34.3* 36.4   MCV 98 99 97    297 307     Recent Labs   Lab Test 11/17/20  0749 11/16/20  0734 11/15/20  0630   CR 0.49* 0.55 0.52     No lab results found.  No lab results found.    Invalid input(s):     "

## 2020-11-17 NOTE — PLAN OF CARE
Alert in she can answer all the questions but is confused at times. Turn and repo q2. VSS on high flow oxygen. Tele NSR. Blood glucose 143, 111, 109. Up with lift. Incontinent of bowel and bladder. Tolerating diet. Plan to continue to monitor tonight.

## 2020-11-17 NOTE — PROGRESS NOTES
[facility]  Hospitalist Progress Note   11/17/2020          Assessment and Plan:       Carolina Frances is a 79 year old female with medical history significant for COPD/emphysema with chronic hypoxemic respiratory failure on 4 LPM NC O2, depression, chronic venous insufficiency, paroxysmal A. fib, hypothyroidism, oropharyngeal dysphagia admitted on 11/11/2020 from outside hospital due to lack of beds for further management.    COVID 19 pneumonia  Acute hypoxic respiratory failure s/p noninvasive mechanical ventilation-multifactorial.  COPD/emphysema  Chronic hypoxemic respiratory failure on 4 LPM NC O2  Possible obstructive sleep apnea.  COVID-19 on 11/8 came back positive as per report. At nursing facility O2 sats ~70%'s. EMS put her on 15 LPM O2 but was still hypoxic. At [facility]  VBG showed pH 7.40, PCO2 44 and PO2 75.  Procalcitonin was 0.11.  WBC 4. CRP 62.86. Fibrinogen 593. D-dimer 416  Ferritin 246    lactic acid 1.1. Chest x-ray from outside hospital- chronic upper lobe scarring and bilateral interstitial opacities.  COVID-19 PCR positive from 11/10 from [facility].  Considered intubation 2/2 progressive hypoxia but able to maintain on BiPAP, weaned off BiPAP after 48 hours  to high flow nasal oxygen on 11/14.  - Continue HFNC, wean off as tolerated.   - [facility] status  - dexamethasone 6 mg IV daily  - s/p remdesivir course  - IV famotidine for GI ppx with steroids  - hold abx, low threshold to start  - ID and Pulmonary following, appreciate assistance   - continue Breo ellipta, incruse ellipta, prn combivent prn  - CT PE ordered/ pending (already on heparin gtt)    Elevated troponin likely in the setting of hypoxia, NSTEMI most likely type II demand ischemia.  New systolic heart failure with EF of 30 to 35%.  Troponin at outside ED slightly elevated per report. POC cardiac ultrasound showed no pericardial effusion.   *Echo basal right ventricle contracts normally but the remaining walls appear at  least moderately hypokinetic. This study was extremely poor and wall motion cannot be assessed on this study. Assessment of LV ejection fraction would also be highly inaccurate. An EF of 30-35% is possibly present but this may be erroneous. Technically difficult, suboptimal study.  - Troponin 0.036 initial, peaked to 2.0 and trended down to 0.066.  - telemetry  - s/p lasix iv, see MAR, last received on 11/16. BNP normalized  - continue heparin gtt  - Cardiology following, await further recs  - repeat echo 11/17 given poor quality of previous  - continue ASA 81 mg daily, atorvastatin 40 mg at HS, metoprolol 2.5 mg IV q6 hours  - I/O's, daily weights    Paroxysmal A. Fib with intermittent RVR.  Hypertension, hyperlipidemia.  TSH within normal limits.  Potassium 3.9, magnesium 2.6.  Has been having intermittent episodes of A. fib with RVR, RRT's and receiving IV metoprolol.  On and off sinus rhythm.  - Continue IV metoprolol 2.5 mg every 6 hours, prn metoprolol available  - heparin gtt  - EP following  Cardiology following, last saw 11/13, await recommendations today. Appreciate co management   - telemetry as above  - repeat echo as above    Delirium from medical illness, hospitalization.  Concern for cognitive impairment.  [facility], per discussion with brother Todd he last met her few years back.  He is unclear about her baseline cognitive status.  Requested floor team to request access to care everywhere, per [facility] - ? no previous records.  Again requested floor team today to reach out to group home, obtain access to records.  During this admission patient has been having intermittent episodes of agitation, alert to self.  Mental status slightly improving, unclear if in the setting of acute medical illness, hospitalization and isolation.  Will need OT for cognitive evaluation once medical condition stabilizes.    Nutrition  Oropharyngeal dysphagia  Discussed with patient's brother Todd on  "11/13 and he would like to start tube feeds if need to.  - seen by SLP, rec nectar thick liquids by spoon    Physical deconditioning from ongoing medical illness, obesity.  Patient resident of nursing facility.  - PT/OT when able    Acute anemia.  Unclear baseline.  Hemoglobin at outside ED 11.5, in hospital 9-11  - trend    Hypothyroidism  Continue PTA levothyroxine   TSH within normal limits.    Depression  Continue PTA Paxil    Chronic venous insufficiency  ACE wraps prn and limb elevation     Obesity with a BMI of 29.86.  Will need lifestyle modification with diet and exercise as able to.  Will need to be evaluated for sleep studies as outpatient.    Chronic constipation.  As needed bowel regimen.    Goals of care discussion   Discussed with patient's brother Todd on 11/13 [reports he makes medical decisions for Carolina] did discuss critical illness and prognosis in the setting of multiple medical comorbid condition.  Expressed understanding regarding current clinical situation and recommends \"to do what needs to be done to keep going\".       DVT Prophylaxis:  Lovenox SQ  Poon Catheter: not present  Code Status:   Full code, discussed with patient's brother.  Discharge disposition >3 days pending clinical improvement, continue IM status.     Discussed with patient, RN    Alexander Dow M.D.  Hospitalist  Pager [phone]  Text Page          Interval History:      Overnight events reviewed. Appears more alert today, knows month/year. Denies living in group home. No cp, states breathing is ok.          Physical Exam:        Physical Exam   Temp:  [98.2  F (36.8  C)-98.4  F (36.9  C)] 98.2  F (36.8  C)  Pulse:  [65-81] 81  Resp:  [10-25] 17  BP: (103-135)/(53-76) 108/54  FiO2 (%):  [70 %] 70 %  SpO2:  [88 %-100 %] 88 %    Intake/Output Summary (Last 24 hours) at 11/12/2020 1257  Last data filed at 11/12/2020 0400  Gross per 24 hour   Intake 600 ml   Output --   Net 600 ml       Admission Weight: 83.9 kg (185 " lb)  Current Weight: 83.9 kg (185 lb)    PHYSICAL EXAM  GENERAL up and awake  HEART: RRR, difficult exam with HFNC and body habitus  LUNGS: clear anteriorly but very limited exam, non-laboured  NEURO: Moving all extremities.  EXTREMITIES: ++ pedal edema.  SKIN: Warm, dry.  PSYCHIATRY alert, oriented to month and year       Medications:          aspirin  81 mg Oral Daily     atorvastatin  40 mg Oral At Bedtime     dexamethasone  6 mg Intravenous Q24H     famotidine  20 mg Intravenous Q12H     fluticasone-vilanterol  1 puff Inhalation Daily     hypromellose-dextran  1 drop Left Eye 4x Daily     insulin aspart  1-6 Units Subcutaneous Q4H     iopamidol  78 mL Intravenous Once     levothyroxine  50 mcg Oral Daily     metoprolol  2.5 mg Intravenous Q6H     midazolam  0.5 mg Intravenous Once     PARoxetine  10 mg Oral QAM     polyethylene glycol  17 g Oral Daily     sodium chloride 0.9 %  100 mL Intravenous Once     sennosides  1 tablet Oral BID     sodium chloride  2 spray Both Nostrils TID     sodium chloride (PF)  10 mL Intravenous Once     sodium chloride (PF)  3 mL Intracatheter Q8H     sodium chloride (PF)  3 mL Intracatheter Q8H     umeclidinium  1 puff Inhalation Daily     acetaminophen, acetaminophen, albuterol, artificial tears ophthalmic solution, glucose **OR** dextrose **OR** glucagon, hydrALAZINE, ipratropium-albuterol, labetalol, lidocaine 4%, lidocaine 4%, lidocaine (buffered or not buffered), lidocaine (buffered or not buffered), - MEDICATION INSTRUCTIONS -, metoprolol, metoprolol, metoprolol, nitroGLYcerin, - MEDICATION INSTRUCTIONS -, ondansetron **OR** ondansetron, - MEDICATION INSTRUCTIONS -, polyethylene glycol, sodium chloride (PF), sodium chloride (PF)         Data:      All new lab and imaging data was reviewed.

## 2020-11-17 NOTE — PLAN OF CARE
PT: Cx, per discussion with RN, pt's baseline is not well known but RN notes pt is very contracted in extremities; RN is unsure if pt would need PT at present and unsure of pt's baseline status but noted she was going to receive more information from pt's group home soon. Per RN pt is not medically stable to tolerate much PT, uncertain if pt would tolerate edge of bed sitting, concerned about pt's high flow O2 needs, so requested PT hold today. RN noted she would look further into pt's baseline to further discuss if appropriate tomorrow.

## 2020-11-18 ENCOUNTER — APPOINTMENT (OUTPATIENT)
Dept: SPEECH THERAPY | Facility: CLINIC | Age: 80
DRG: 177 | End: 2020-11-18
Attending: INTERNAL MEDICINE
Payer: COMMERCIAL

## 2020-11-18 LAB
ALBUMIN SERPL-MCNC: 2.7 G/DL (ref 3.4–5)
ALP SERPL-CCNC: 72 U/L (ref 40–150)
ALT SERPL W P-5'-P-CCNC: 23 U/L (ref 0–50)
ANION GAP SERPL CALCULATED.3IONS-SCNC: 3 MMOL/L (ref 3–14)
AST SERPL W P-5'-P-CCNC: 26 U/L (ref 0–45)
BASOPHILS # BLD AUTO: 0 10E9/L (ref 0–0.2)
BASOPHILS NFR BLD AUTO: 0.1 %
BILIRUB SERPL-MCNC: 0.5 MG/DL (ref 0.2–1.3)
BUN SERPL-MCNC: 19 MG/DL (ref 7–30)
CALCIUM SERPL-MCNC: 8.4 MG/DL (ref 8.5–10.1)
CHLORIDE SERPL-SCNC: 100 MMOL/L (ref 94–109)
CO2 SERPL-SCNC: 36 MMOL/L (ref 20–32)
CREAT SERPL-MCNC: 0.57 MG/DL (ref 0.52–1.04)
CRP SERPL-MCNC: 9.4 MG/L (ref 0–8)
DIFFERENTIAL METHOD BLD: ABNORMAL
EOSINOPHIL # BLD AUTO: 0 10E9/L (ref 0–0.7)
EOSINOPHIL NFR BLD AUTO: 0 %
ERYTHROCYTE [DISTWIDTH] IN BLOOD BY AUTOMATED COUNT: 13.5 % (ref 10–15)
ERYTHROCYTE [SEDIMENTATION RATE] IN BLOOD BY WESTERGREN METHOD: 33 MM/H (ref 0–30)
GFR SERPL CREATININE-BSD FRML MDRD: 89 ML/MIN/{1.73_M2}
GLUCOSE BLDC GLUCOMTR-MCNC: 155 MG/DL (ref 70–99)
GLUCOSE BLDC GLUCOMTR-MCNC: 165 MG/DL (ref 70–99)
GLUCOSE BLDC GLUCOMTR-MCNC: 174 MG/DL (ref 70–99)
GLUCOSE BLDC GLUCOMTR-MCNC: 220 MG/DL (ref 70–99)
GLUCOSE SERPL-MCNC: 153 MG/DL (ref 70–99)
HCT VFR BLD AUTO: 38.8 % (ref 35–47)
HGB BLD-MCNC: 11.8 G/DL (ref 11.7–15.7)
IMM GRANULOCYTES # BLD: 0.1 10E9/L (ref 0–0.4)
IMM GRANULOCYTES NFR BLD: 1.3 %
LYMPHOCYTES # BLD AUTO: 0.8 10E9/L (ref 0.8–5.3)
LYMPHOCYTES NFR BLD AUTO: 8.1 %
MCH RBC QN AUTO: 29.9 PG (ref 26.5–33)
MCHC RBC AUTO-ENTMCNC: 30.4 G/DL (ref 31.5–36.5)
MCV RBC AUTO: 99 FL (ref 78–100)
MONOCYTES # BLD AUTO: 0.4 10E9/L (ref 0–1.3)
MONOCYTES NFR BLD AUTO: 4.3 %
NEUTROPHILS # BLD AUTO: 8.8 10E9/L (ref 1.6–8.3)
NEUTROPHILS NFR BLD AUTO: 86.2 %
NRBC # BLD AUTO: 0 10*3/UL
NRBC BLD AUTO-RTO: 0 /100
PLATELET # BLD AUTO: 350 10E9/L (ref 150–450)
POTASSIUM SERPL-SCNC: 4 MMOL/L (ref 3.4–5.3)
PROT SERPL-MCNC: 6.4 G/DL (ref 6.8–8.8)
RBC # BLD AUTO: 3.94 10E12/L (ref 3.8–5.2)
SODIUM SERPL-SCNC: 139 MMOL/L (ref 133–144)
UFH PPP CHRO-ACNC: 0.22 IU/ML
UFH PPP CHRO-ACNC: 0.25 IU/ML
UFH PPP CHRO-ACNC: 0.34 IU/ML
WBC # BLD AUTO: 10.2 10E9/L (ref 4–11)

## 2020-11-18 PROCEDURE — 85520 HEPARIN ASSAY: CPT | Performed by: INTERNAL MEDICINE

## 2020-11-18 PROCEDURE — 250N000013 HC RX MED GY IP 250 OP 250 PS 637: Performed by: INTERNAL MEDICINE

## 2020-11-18 PROCEDURE — 85520 HEPARIN ASSAY: CPT | Performed by: HOSPITALIST

## 2020-11-18 PROCEDURE — 250N000011 HC RX IP 250 OP 636: Performed by: HOSPITALIST

## 2020-11-18 PROCEDURE — 999N001017 HC STATISTIC GLUCOSE BY METER IP

## 2020-11-18 PROCEDURE — 85652 RBC SED RATE AUTOMATED: CPT | Performed by: INTERNAL MEDICINE

## 2020-11-18 PROCEDURE — 36415 COLL VENOUS BLD VENIPUNCTURE: CPT | Performed by: HOSPITALIST

## 2020-11-18 PROCEDURE — 36415 COLL VENOUS BLD VENIPUNCTURE: CPT | Performed by: INTERNAL MEDICINE

## 2020-11-18 PROCEDURE — 92526 ORAL FUNCTION THERAPY: CPT | Mod: GN | Performed by: SPEECH-LANGUAGE PATHOLOGIST

## 2020-11-18 PROCEDURE — 250N000013 HC RX MED GY IP 250 OP 250 PS 637: Performed by: HOSPITALIST

## 2020-11-18 PROCEDURE — 86140 C-REACTIVE PROTEIN: CPT | Performed by: INTERNAL MEDICINE

## 2020-11-18 PROCEDURE — 999N000147 HC STATISTIC PT IP EVAL DEFER: Performed by: PHYSICAL THERAPIST

## 2020-11-18 PROCEDURE — 120N000001 HC R&B MED SURG/OB

## 2020-11-18 PROCEDURE — 80053 COMPREHEN METABOLIC PANEL: CPT | Performed by: INTERNAL MEDICINE

## 2020-11-18 PROCEDURE — 250N000009 HC RX 250: Performed by: HOSPITALIST

## 2020-11-18 PROCEDURE — 85025 COMPLETE CBC W/AUTO DIFF WBC: CPT | Performed by: INTERNAL MEDICINE

## 2020-11-18 PROCEDURE — 120N000013 HC R&B IMCU

## 2020-11-18 PROCEDURE — 99233 SBSQ HOSP IP/OBS HIGH 50: CPT | Performed by: INTERNAL MEDICINE

## 2020-11-18 RX ORDER — HEPARIN SODIUM 10000 [USP'U]/100ML
0-5000 INJECTION, SOLUTION INTRAVENOUS CONTINUOUS
Status: ACTIVE | OUTPATIENT
Start: 2020-11-18 | End: 2020-11-18

## 2020-11-18 RX ADMIN — INSULIN ASPART 1 UNITS: 100 INJECTION, SOLUTION INTRAVENOUS; SUBCUTANEOUS at 17:57

## 2020-11-18 RX ADMIN — DEXTRAN 70, GLYCERIN, HYPROMELLOSE 1 DROP: 1; 2; 3 SOLUTION/ DROPS OPHTHALMIC at 08:57

## 2020-11-18 RX ADMIN — METOPROLOL TARTRATE 2.5 MG: 5 INJECTION INTRAVENOUS at 11:42

## 2020-11-18 RX ADMIN — INSULIN ASPART 1 UNITS: 100 INJECTION, SOLUTION INTRAVENOUS; SUBCUTANEOUS at 08:54

## 2020-11-18 RX ADMIN — FAMOTIDINE 20 MG: 10 INJECTION INTRAVENOUS at 08:50

## 2020-11-18 RX ADMIN — APIXABAN 5 MG: 5 TABLET, FILM COATED ORAL at 21:16

## 2020-11-18 RX ADMIN — INSULIN ASPART 2 UNITS: 100 INJECTION, SOLUTION INTRAVENOUS; SUBCUTANEOUS at 21:17

## 2020-11-18 RX ADMIN — DEXTRAN 70, GLYCERIN, HYPROMELLOSE 1 DROP: 1; 2; 3 SOLUTION/ DROPS OPHTHALMIC at 21:17

## 2020-11-18 RX ADMIN — INSULIN ASPART 1 UNITS: 100 INJECTION, SOLUTION INTRAVENOUS; SUBCUTANEOUS at 11:46

## 2020-11-18 RX ADMIN — LEVOTHYROXINE SODIUM 50 MCG: 50 TABLET ORAL at 07:17

## 2020-11-18 RX ADMIN — INSULIN ASPART 1 UNITS: 100 INJECTION, SOLUTION INTRAVENOUS; SUBCUTANEOUS at 04:42

## 2020-11-18 RX ADMIN — UMECLIDINIUM 1 PUFF: 62.5 AEROSOL, POWDER ORAL at 08:53

## 2020-11-18 RX ADMIN — FAMOTIDINE 20 MG: 10 INJECTION INTRAVENOUS at 21:16

## 2020-11-18 RX ADMIN — HEPARIN SODIUM 600 UNITS/HR: 10000 INJECTION, SOLUTION INTRAVENOUS at 01:11

## 2020-11-18 RX ADMIN — Medication 2 SPRAY: at 17:57

## 2020-11-18 RX ADMIN — Medication 2 SPRAY: at 08:58

## 2020-11-18 RX ADMIN — DEXAMETHASONE SODIUM PHOSPHATE 6 MG: 4 INJECTION, SOLUTION INTRAMUSCULAR; INTRAVENOUS at 18:00

## 2020-11-18 RX ADMIN — INSULIN ASPART 2 UNITS: 100 INJECTION, SOLUTION INTRAVENOUS; SUBCUTANEOUS at 00:57

## 2020-11-18 RX ADMIN — FLUTICASONE FUROATE AND VILANTEROL TRIFENATATE 1 PUFF: 200; 25 POWDER RESPIRATORY (INHALATION) at 08:53

## 2020-11-18 RX ADMIN — DEXTRAN 70, GLYCERIN, HYPROMELLOSE 1 DROP: 1; 2; 3 SOLUTION/ DROPS OPHTHALMIC at 17:57

## 2020-11-18 RX ADMIN — METOPROLOL TARTRATE 12.5 MG: 25 TABLET, FILM COATED ORAL at 21:16

## 2020-11-18 RX ADMIN — METOPROLOL TARTRATE 2.5 MG: 5 INJECTION INTRAVENOUS at 01:14

## 2020-11-18 RX ADMIN — PAROXETINE 10 MG: 10 TABLET, FILM COATED ORAL at 08:43

## 2020-11-18 RX ADMIN — ASPIRIN 81 MG: 81 TABLET, CHEWABLE ORAL at 08:43

## 2020-11-18 RX ADMIN — Medication 2 SPRAY: at 21:17

## 2020-11-18 RX ADMIN — DEXTRAN 70, GLYCERIN, HYPROMELLOSE 1 DROP: 1; 2; 3 SOLUTION/ DROPS OPHTHALMIC at 13:31

## 2020-11-18 RX ADMIN — METOPROLOL TARTRATE 2.5 MG: 5 INJECTION INTRAVENOUS at 07:16

## 2020-11-18 RX ADMIN — HEPARIN SODIUM 600 UNITS/HR: 10000 INJECTION, SOLUTION INTRAVENOUS at 10:34

## 2020-11-18 RX ADMIN — ATORVASTATIN CALCIUM 40 MG: 40 TABLET, FILM COATED ORAL at 21:16

## 2020-11-18 ASSESSMENT — ACTIVITIES OF DAILY LIVING (ADL)
ADLS_ACUITY_SCORE: 20
ADLS_ACUITY_SCORE: 24
ADLS_ACUITY_SCORE: 20

## 2020-11-18 NOTE — PLAN OF CARE
COVID +, Disoriented to time & place. Very forgetful, however seemed to follow directions better today. VSS ex on HFNC @ 70% & 35L. Lung sounds diminished/ crackles. Congested nonproductive cough. Bowel sounds active,  Purewick in place w/ adequate urine output. Right arm contracted, brace in place. Assist x2 turn @ repo. DD1 nectar thicken liquid diet. Pt tolerating and ate most of meals. Total feed. Denies pain. Heparin gtt running @ 450 units. Q4hrs blood sugars. Tele: NSR w/ PVC

## 2020-11-18 NOTE — PLAN OF CARE
"Spoke with PT along with Dr. Dow regarding pt's baseline & therapy needs. Due to pt's baseline limited ability to sit up and \"bend\" decided there is no need for PT at this time. However, would benefit from OT, as pt is requiring total feed at this time and baseline feeds herself with occasional assist.   ---OT ordered.   "

## 2020-11-18 NOTE — PROGRESS NOTES
"PULMONOLOGY PROGRESS NOTE    Date of Admission: 11/11/2020    CC/Reason for Hospital visit: COPD, COVID-19 pneumonia  SUBJECTIVE      More alert today,Says her breathing feels much better, continues to have mild cough,  Afebrile. No worsening respiratory complaints at this time. Feels breathing is improving    ROS: A Problem Pertinent review of systems was negative except for items noted in HPI.  Past Medical, Family, and Social/Substance History has been reviewed: No interval changes.    OBJECTIVE   Vital signs:  Temp: 98  F (36.7  C) Temp src: Oral BP: 118/75 Pulse: 66   Resp: 17 SpO2: 100 %   Oxygen Delivery: 35 LPM Height: 167.6 cm (5' 6\") Weight: 78.8 kg (173 lb 11.6 oz)  Estimated body mass index is 28.04 kg/m  as calculated from the following:    Height as of this encounter: 1.676 m (5' 6\").    Weight as of this encounter: 78.8 kg (173 lb 11.6 oz).      No intake/output data recorded.      *Due to the current COVID-19 pandemic, with need to conserve PPE and minimize non-essential exposure to patients diagnosed or under investigation, a limited examination was performed on this patient. Interview was done via patient's hospital room telephone, and patient was visualized through hospital door window. Attending provider's physical exam findings were noted*      LABORATORY ASSESSMENT    Arterial Blood Gas  Recent Labs   Lab 11/13/20  0653 11/12/20  1330   PH  --  7.22*   PCO2  --  69*   PO2  --  71*   HCO3  --  28   O2PER 60 percent  50%     CBC  Recent Labs   Lab 11/18/20  0651 11/16/20  0734 11/15/20  0630 11/14/20  0528   WBC 10.2 10.1 7.0 7.9   RBC 3.94 3.77* 3.48* 3.75*   HGB 11.8 11.4* 10.5* 11.3*   HCT 38.8 37.1 34.3* 36.4   MCV 99 98 99 97   MCH 29.9 30.2 30.2 30.1   MCHC 30.4* 30.7* 30.6* 31.0*   RDW 13.5 13.7 13.8 14.0    338 297 307     BMP  Recent Labs   Lab 11/18/20  0651 11/17/20  0749 11/16/20  0734 11/15/20  0630    135 139 144   POTASSIUM 4.0 3.8 3.6 3.5   CHLORIDE 100 95 99 106 "   YAMINI 8.4* 7.9* 8.0* 8.3*   CO2 36* 36* 36* 34*   BUN 19 22 24 28   CR 0.57 0.49* 0.55 0.52   * 145* 147* 149*     INR  Recent Labs   Lab 11/17/20  0749 11/15/20  0630 11/14/20  0528 11/13/20  0653   INR 1.13 1.17* 1.18* 1.11      BNPNo lab results found in last 7 days.  VENOUS BLOOD GASES  Recent Labs   Lab 11/13/20  0653   PHV 7.35   PCO2V 58*   PO2V 44   HCO3V 32*   ENRIQUE 4.9       Additional labs and/or comments:    IMAGING      CXR 11/12 -  IMPRESSION: Coarse interstitial infiltrates in the upper lungs and  left lower lobe. Without a comparison examination, difficult to know  how much of the opacity is acute versus chronic. Lungs appear  hyperinflated. Heart size normal. No pleural effusion or pneumothorax.  Chronic fracture deformity of the right humerus partially visualized.    PFT & OTHER TESTING       ASSESSMENT / PLAN      Pulmonary Diagnoses:  Abnl CT/CXR R91.8  COPD J44.9  COVID-19 U07.1  Hypoxemia R09.02  Resp fail acute J96.00  Resp fail chronic J96.10     Additional COVID-19 diagnoses:  Pneumonia due to confirmed COVID-19 J12.89, U07.1    ASSESSMENT: 79-year-old obese female nursing home resident with multiple medical problems including severe oxygen dependent COPD admitted for worsening hypoxemia secondary to COVID-19 pneumonia.  Chest x-ray on admission showed coarse interstitial infiltrates in the upper lobes and left lower lobe with hyperinflated lung fields.  Patient was treated with BiPAP and started on remdesivir and Decadron.  Patient has been transitioned to high flow nasal cannula. Agree with continuing support,  PLAN:  1. Adjust oxygen, keep SaO2 > 90%  2. Will likely benefit from physical therapy  3. BiPAP With every sleep opportunity  4. Bronchodilators - Breo 200/25, Incruse  5. Remdesivir per ID   6. Steroids - Continue Decadron.  7. Physical activity as tolerated  8. The patient's room phone number is [phone]  9. Please call if questions    Romaine Torres M.D.  Pulmonary,  Critical Care and Sleep Medicine  [facility]  Office:[phone]

## 2020-11-18 NOTE — PROGRESS NOTES
[facility]  Hospitalist Progress Note   11/18/2020          Assessment and Plan:       Carolina Frances is a 79 year old female with medical history significant for COPD/emphysema with chronic hypoxemic respiratory failure on 4 LPM NC O2, depression, chronic venous insufficiency, paroxysmal A. fib, hypothyroidism, oropharyngeal dysphagia admitted on 11/11/2020 from outside hospital due to lack of beds for further management.    COVID 19 pneumonia  Acute hypoxic respiratory failure s/p noninvasive mechanical ventilation-multifactorial.  COPD/emphysema  Chronic hypoxemic respiratory failure on 2 LPM NC O2  Possible obstructive sleep apnea.  COVID-19 on 11/8 came back positive as per report. At nursing facility O2 sats ~70%'s. EMS put her on 15 LPM O2 but was still hypoxic. Procalcitonin was 0.11. WBC 4. CRP 62.86.  D-dimer 416, lactic acid 1.1. Chest x-ray from outside hospital- chronic upper lobe scarring and bilateral interstitial opacities.  COVID-19 PCR positive from 11/10. Considered intubation 2/2 progressive hypoxia but able to maintain on BiPAP, weaned off BiPAP after 48 hours  to high flow nasal oxygen on 11/14.  - Continue HFNC, wean off as tolerated, sats good 11/18  - [facility] status  - dexamethasone 6 mg IV daily  - s/p remdesivir course, completed 11/15  - IV famotidine for GI ppx with steroids  - hold abx, low threshold to start  - ID and Pulmonary following, appreciate assistance   - continue Breo ellipta, incruse ellipta, prn combivent prn  - CT PE on hold as cannot transport to radiology with high flow O2, hold for now    NSTEMI, likely type II demand ischemia.  Acute HFrEF (unknown chronicity)  Troponin at outside ED slightly elevated per report. POC cardiac ultrasound showed no pericardial effusion.   *echo (repeat) 11/17 with EF 45-50, could not assess RWMA. RV fn appeared mild-mod reduced (previous echo 30-35% but unclear and very poor quality)  - Troponin 0.036 initial, peaked to 2.0  and trended down to 0.066.  - telemetry  - s/p lasix iv, see MAR, last received on 11/16.   - discontinue heparin (per cards recs), change to eliquis 5 mg BId  - Cardiology following, await further recs  - repeat echo 11/17 as above, EF 45-50%, unable to assess WMA  - BNP 1301 (normal) 11/17  - continue ASA 81 mg daily, atorvastatin 40 mg at HS, metoprolol 2.5 mg IV q6 hours  - I/O's, daily weights (stable 11/18)    Paroxysmal A. Fib with intermittent RVR.  Hypertension  HLD  TSH within normal limits.  Potassium 3.9, magnesium 2.6.  Has been having intermittent episodes of A. fib with RVR, RRT's and receiving IV metoprolol.  On and off sinus rhythm.  - change metoprolol to 12.5 mg BID  - stop heparin, start eliquis 5 mg BID  - telemetry, NSR 11/18  - repeat echo 11/17 as above    Delirium from medical illness, hospitalization.  likely cognitive impairment.  Patient resident of [facility], per discussion with brother Todd he last met her few years back.  He is unclear about her baseline cognitive status. Intermittent agitation during hospital stay, now improved  - states she lives by self in apartment with help. Ambulates well, occasionally uses walker (none of this is correct)  - OT consult    Nutrition  Oropharyngeal dysphagia  Discussed with patient's brother Todd on 11/13 and he would like to start tube feeds if need to.  - seen by SLP, rec nectar thick liquids by spoon    Physical deconditioning from ongoing medical illness, obesity.  Patient resident of nursing facility.  - OT consult    Acute anemia.  Unclear baseline.  Hemoglobin at outside ED 11.5, in hospital 9-11  - trend, hgb 11.8 stable 11/18    Hypothyroidism  Continue PTA levothyroxine   TSH within normal limits.    Depression  Continue PTA Paxil    Chronic venous insufficiency  ACE wraps prn and limb elevation     Obesity with a BMI of 29.86.  Will need lifestyle modification with diet and exercise as able to.  Will need to be evaluated for sleep  "studies as outpatient.    Chronic constipation.  As needed bowel regimen.    Goals of care discussion   Discussed with patient's brother Todd on 11/13 [reports he makes medical decisions for [place]] did discuss critical illness and prognosis in the setting of multiple medical comorbid condition.  Expressed understanding regarding current clinical situation and recommends \"to do what needs to be done to keep going\".       DVT Prophylaxis:  heparin gtt  Poon Catheter: not present  Code Status:   Full code, discussed with patient's brother.  Discharge disposition >3 days pending clinical improvement, continue IMC status.     Discussed with patient, RN    Alexander Dow M.D.  Hospitalist  Pager [phone]  Text Page          Interval History:      Overnight events reviewed. Doing well. States breathing better. Denies chest pain. Eating well. No abdominal pain         Physical Exam:        Physical Exam   Temp:  [97.4  F (36.3  C)-98.2  F (36.8  C)] 98  F (36.7  C)  Pulse:  [62-77] 73  Resp:  [12-23] 21  BP: (108-149)/(51-75) 122/58  FiO2 (%):  [70 %] 70 %  SpO2:  [91 %-100 %] 99 %    Intake/Output Summary (Last 24 hours) at 11/12/2020 1257  Last data filed at 11/12/2020 0400  Gross per 24 hour   Intake 600 ml   Output --   Net 600 ml       Admission Weight: 83.9 kg (185 lb)  Current Weight: 83.9 kg (185 lb)    PHYSICAL EXAM  GENERAL up and awake  HEART: RRR, difficult exam with HFNC   LUNGS: clear anteriorly but very limited exam, non-laboured  NEURO: Moving all extremities.  EXTREMITIES: no presacral edema  SKIN: Warm, dry.         Medications:          aspirin  81 mg Oral Daily     atorvastatin  40 mg Oral At Bedtime     dexamethasone  6 mg Intravenous Q24H     famotidine  20 mg Intravenous Q12H     fluticasone-vilanterol  1 puff Inhalation Daily     hypromellose-dextran  1 drop Left Eye 4x Daily     insulin aspart  1-6 Units Subcutaneous Q4H     iopamidol  78 mL Intravenous Once     levothyroxine  50 mcg Oral " Daily     metoprolol  2.5 mg Intravenous Q6H     midazolam  0.5 mg Intravenous Once     PARoxetine  10 mg Oral QAM     polyethylene glycol  17 g Oral Daily     sodium chloride 0.9 %  100 mL Intravenous Once     sennosides  1 tablet Oral BID     sodium chloride  2 spray Both Nostrils TID     sodium chloride (PF)  10 mL Intravenous Once     sodium chloride (PF)  3 mL Intracatheter Q8H     sodium chloride (PF)  3 mL Intracatheter Q8H     umeclidinium  1 puff Inhalation Daily     acetaminophen, acetaminophen, albuterol, artificial tears ophthalmic solution, glucose **OR** dextrose **OR** glucagon, hydrALAZINE, ipratropium-albuterol, labetalol, lidocaine 4%, lidocaine 4%, lidocaine (buffered or not buffered), lidocaine (buffered or not buffered), - MEDICATION INSTRUCTIONS -, metoprolol, metoprolol, metoprolol, nitroGLYcerin, - MEDICATION INSTRUCTIONS -, ondansetron **OR** ondansetron, - MEDICATION INSTRUCTIONS -, polyethylene glycol, sodium chloride (PF), sodium chloride (PF)         Data:      All new lab and imaging data was reviewed.

## 2020-11-18 NOTE — PROGRESS NOTES
[facility] Nurse Inpatient Wound Assessment   .  Unable to see patient x 2 due to need more than one person to turn. Approached nurse and we had a plan and was unable to return   Patient History  According to provider note(s):  Carolina Frances is a 79 year old female with medical history significant for COPD/emphysema with chronic hypoxemic respiratory failure on 4 LPM NC O2, depression, chronic venous insufficiency, paroxysmal A. fib, hypothyroidism, oropharyngeal dysphagia admitted on 11/11/2020 from outside hospital due to lack of beds for further management.  Patient was treated with BiPAP and started on remdesivir and Decadron.  Patient has been transitioned to high flow nasal cannula.  Would continue present supportive treatment for now.

## 2020-11-18 NOTE — PROGRESS NOTES
"BRIEF NUTRITION ASSESSMENT      REASON FOR ASSESSMENT:  Carolina Frances is a 79 year old female assessed by Registered Dietitian for LOS      CURRENT DIET AND INTAKE:  Diet:  (11/17) DD1, NTL               Chart reviewed  Pt is a LTC resident   Disoriented to time & place. Very forgetful.  Pt currently in COVID isolation - unable to visit  Note she is a total feed for meals  Per RN note - pt ate most of her meals yesterday  Breakfast order today - Qatari toast, sausage, oatmeal, peaches, pudding      ANTHROPOMETRICS:  Height: 5' 6\"  Weight:(11/16) 78.8 kg /  173 lbs 11.56 oz  Body mass index is 28.04 kg/m .   Weight Status: Overweight BMI 25-29.9  IBW:  59.1 kg  %IBW: 133%  Weight History:   Wt Readings from Last 10 Encounters:   11/16/20 78.8 kg (173 lb 11.6 oz)         LABS:  Labs noted    MALNUTRITION:  Visual Nutrition Focused Physical Assessment (NFPA) not completed due to restrictions on face-to-face patient care during COVID-19 Pandemic.   Do not suspect muscle/fat losses - no wt loss was noted  Patient does not meet two of the following criteria necessary for diagnosing malnutrition.     % Weight Loss:  None noted  % Intake:  </= 50% for >/= 5 days (severe malnutrition) - pt NPO/clear liquid diet  Subcutaneous Fat Loss:  Deferred at this time  Muscle Loss:  Deferred at this time  Fluid Retention:  None noted    NUTRITION INTERVENTION:  Nutrition Diagnosis:  No nutrition diagnosis at this time.    Implementation:  Nutrition Education ---> not appropriate at this time due to patient condition  Will send a thickened Boost supplement with meals for added nutrition    FOLLOW UP/MONITORING:   Will re-evaluate in 7 - 10 days, or sooner, if re-consulted.          "

## 2020-11-18 NOTE — PROGRESS NOTES
[facility]  Infectious Disease Progress Note          Assessment and Plan:   IMPRESSION:   1.  A 79-year-old female with acute COVID-19 pneumonia, progressive respiratory insufficiency, no clear secondary or other infection.   2.  Respiratory failure due to COVID-19 despite interventions.   3.  Obesity.   4.  Chronic venous stasis disease without obvious cellulitis.   5.  Chronic obstructive lung disease.      RECOMMENDATIONS:   1.  Continue remdesivir, on day 5, steroids, anticoagulation and nonspecific respiratory and other treatments.   2.  Convalescent plasma both not available and out of favor at present, as multiple progressive studies and data suggest no benefits and currently no supply anyway, not indicated at this point.  No indication for intervening with tocilizumab based on lab profile.   3.  Agree no antibiotics for now, but low threshold to add, especially given underlying COPD, but procalcitonin low and no obvious infection.   Conceivably extend remdesivir although labs etc suggest maybe mostly at lung damage rather than active viral issue, discontinue remdesivir, further the case CRP now near nl    still sleepy, ill, high flow O2 still needed  CT chest pending        Interval History:   no new complaints confused, no fever no + cxs,  LFTs and creat OK  Needing high flow O2 still   crp all the way down to 9               Medications:       aspirin  81 mg Oral Daily     atorvastatin  40 mg Oral At Bedtime     dexamethasone  6 mg Intravenous Q24H     famotidine  20 mg Intravenous Q12H     fluticasone-vilanterol  1 puff Inhalation Daily     hypromellose-dextran  1 drop Left Eye 4x Daily     insulin aspart  1-6 Units Subcutaneous Q4H     iopamidol  78 mL Intravenous Once     levothyroxine  50 mcg Oral Daily     metoprolol  2.5 mg Intravenous Q6H     midazolam  0.5 mg Intravenous Once     PARoxetine  10 mg Oral QAM     polyethylene glycol  17 g Oral Daily     sodium chloride 0.9 %   "100 mL Intravenous Once     sennosides  1 tablet Oral BID     sodium chloride  2 spray Both Nostrils TID     sodium chloride (PF)  10 mL Intravenous Once     sodium chloride (PF)  3 mL Intracatheter Q8H     sodium chloride (PF)  3 mL Intracatheter Q8H     umeclidinium  1 puff Inhalation Daily                  Physical Exam:   Blood pressure 118/75, pulse 66, temperature 98  F (36.7  C), temperature source Oral, resp. rate 17, height 1.676 m (5' 6\"), weight 78.8 kg (173 lb 11.6 oz), SpO2 100 %.  Wt Readings from Last 2 Encounters:   11/16/20 78.8 kg (173 lb 11.6 oz)     Vital Signs with Ranges  Temp:  [97.4  F (36.3  C)-98.2  F (36.8  C)] 98  F (36.7  C)  Pulse:  [63-81] 66  Resp:  [12-23] 17  BP: (108-149)/(51-77) 118/75  FiO2 (%):  [70 %] 70 %  SpO2:  [88 %-100 %] 100 %    Constitutional: Awake, alert, confusede, no apparent distress   Lungs: Congestion to auscultation bilaterally, no crackles or wheezing   Cardiovascular: Regular rate and rhythm, normal S1 and S2, and no murmur noted   Abdomen: Normal bowel sounds, soft, non-distended, non-tender   Skin: No rashes, no cyanosis, no edema   Other:           Data:   All microbiology laboratory data reviewed.  Recent Labs   Lab Test 11/18/20  0651 11/16/20  0734 11/15/20  0630   WBC 10.2 10.1 7.0   HGB 11.8 11.4* 10.5*   HCT 38.8 37.1 34.3*   MCV 99 98 99    338 297     Recent Labs   Lab Test 11/18/20  0651 11/17/20  0749 11/16/20  0734   CR 0.57 0.49* 0.55     Recent Labs   Lab Test 11/18/20  0651   SED 33*     No lab results found.    Invalid input(s): JED    "

## 2020-11-18 NOTE — PLAN OF CARE
Forgetful to time & situation, easily re-oriented. Cooperative. VSS on HFNC at 35L with Fi02 70%. Sat in the high 90%. Tele SR. Heparin infusing as ordered. Adequate UOP via pure wick. Turned/repositioned Q2hrs. Denies pain. Plans to start weaning down oxygen, discharge needs pending progress.

## 2020-11-18 NOTE — PLAN OF CARE
"PT: Orders received. Chart reviewed and discussed with care team. Therapist phoned pt's site RN Carolina (# in care coordinator note) and learned that at baseline pt can feed self but sometimes intermittently requires assist (though not full) and is christo dependent but generally refuses to sit up (resists per RN and \"doesn't bend well\" per RN) and does not sit up in a WC at baseline. Discussed this with pt's RN as it does not appear pt has any skilled PT needs.  Will complete orders.    "

## 2020-11-19 LAB
ANION GAP SERPL CALCULATED.3IONS-SCNC: 3 MMOL/L (ref 3–14)
BASOPHILS # BLD AUTO: 0 10E9/L (ref 0–0.2)
BASOPHILS NFR BLD AUTO: 0.1 %
BUN SERPL-MCNC: 18 MG/DL (ref 7–30)
CALCIUM SERPL-MCNC: 8.6 MG/DL (ref 8.5–10.1)
CHLORIDE SERPL-SCNC: 100 MMOL/L (ref 94–109)
CO2 SERPL-SCNC: 33 MMOL/L (ref 20–32)
CREAT SERPL-MCNC: 0.47 MG/DL (ref 0.52–1.04)
CRP SERPL-MCNC: 6.9 MG/L (ref 0–8)
DIFFERENTIAL METHOD BLD: ABNORMAL
EOSINOPHIL # BLD AUTO: 0 10E9/L (ref 0–0.7)
EOSINOPHIL NFR BLD AUTO: 0 %
ERYTHROCYTE [DISTWIDTH] IN BLOOD BY AUTOMATED COUNT: 13.6 % (ref 10–15)
GFR SERPL CREATININE-BSD FRML MDRD: >90 ML/MIN/{1.73_M2}
GLUCOSE BLDC GLUCOMTR-MCNC: 115 MG/DL (ref 70–99)
GLUCOSE BLDC GLUCOMTR-MCNC: 133 MG/DL (ref 70–99)
GLUCOSE BLDC GLUCOMTR-MCNC: 148 MG/DL (ref 70–99)
GLUCOSE BLDC GLUCOMTR-MCNC: 163 MG/DL (ref 70–99)
GLUCOSE BLDC GLUCOMTR-MCNC: 233 MG/DL (ref 70–99)
GLUCOSE SERPL-MCNC: 129 MG/DL (ref 70–99)
HCT VFR BLD AUTO: 33.6 % (ref 35–47)
HGB BLD-MCNC: 10.2 G/DL (ref 11.7–15.7)
IMM GRANULOCYTES # BLD: 0.2 10E9/L (ref 0–0.4)
IMM GRANULOCYTES NFR BLD: 1.5 %
LYMPHOCYTES # BLD AUTO: 0.9 10E9/L (ref 0.8–5.3)
LYMPHOCYTES NFR BLD AUTO: 6.7 %
MCH RBC QN AUTO: 29.6 PG (ref 26.5–33)
MCHC RBC AUTO-ENTMCNC: 30.4 G/DL (ref 31.5–36.5)
MCV RBC AUTO: 97 FL (ref 78–100)
MONOCYTES # BLD AUTO: 0.8 10E9/L (ref 0–1.3)
MONOCYTES NFR BLD AUTO: 5.8 %
NEUTROPHILS # BLD AUTO: 11.1 10E9/L (ref 1.6–8.3)
NEUTROPHILS NFR BLD AUTO: 85.9 %
PLATELET # BLD AUTO: 313 10E9/L (ref 150–450)
POTASSIUM SERPL-SCNC: 4 MMOL/L (ref 3.4–5.3)
RBC # BLD AUTO: 3.45 10E12/L (ref 3.8–5.2)
SODIUM SERPL-SCNC: 136 MMOL/L (ref 133–144)
WBC # BLD AUTO: 13 10E9/L (ref 4–11)

## 2020-11-19 PROCEDURE — 120N000013 HC R&B IMCU

## 2020-11-19 PROCEDURE — 86140 C-REACTIVE PROTEIN: CPT | Performed by: INTERNAL MEDICINE

## 2020-11-19 PROCEDURE — 120N000001 HC R&B MED SURG/OB

## 2020-11-19 PROCEDURE — 80048 BASIC METABOLIC PNL TOTAL CA: CPT | Performed by: INTERNAL MEDICINE

## 2020-11-19 PROCEDURE — 999N000157 HC STATISTIC RCP TIME EA 10 MIN

## 2020-11-19 PROCEDURE — 250N000012 HC RX MED GY IP 250 OP 636 PS 637: Performed by: INTERNAL MEDICINE

## 2020-11-19 PROCEDURE — 99232 SBSQ HOSP IP/OBS MODERATE 35: CPT | Performed by: INTERNAL MEDICINE

## 2020-11-19 PROCEDURE — 250N000013 HC RX MED GY IP 250 OP 250 PS 637: Performed by: HOSPITALIST

## 2020-11-19 PROCEDURE — 85025 COMPLETE CBC W/AUTO DIFF WBC: CPT | Performed by: INTERNAL MEDICINE

## 2020-11-19 PROCEDURE — 250N000009 HC RX 250: Performed by: HOSPITALIST

## 2020-11-19 PROCEDURE — G0463 HOSPITAL OUTPT CLINIC VISIT: HCPCS

## 2020-11-19 PROCEDURE — 250N000013 HC RX MED GY IP 250 OP 250 PS 637: Performed by: INTERNAL MEDICINE

## 2020-11-19 PROCEDURE — 36415 COLL VENOUS BLD VENIPUNCTURE: CPT | Performed by: INTERNAL MEDICINE

## 2020-11-19 PROCEDURE — 999N001017 HC STATISTIC GLUCOSE BY METER IP

## 2020-11-19 RX ORDER — PANTOPRAZOLE SODIUM 40 MG/1
40 TABLET, DELAYED RELEASE ORAL
Status: DISCONTINUED | OUTPATIENT
Start: 2020-11-20 | End: 2020-11-24

## 2020-11-19 RX ADMIN — ATORVASTATIN CALCIUM 40 MG: 40 TABLET, FILM COATED ORAL at 21:22

## 2020-11-19 RX ADMIN — DEXAMETHASONE 6 MG: 2 TABLET ORAL at 21:09

## 2020-11-19 RX ADMIN — DEXTRAN 70, GLYCERIN, HYPROMELLOSE 1 DROP: 1; 2; 3 SOLUTION/ DROPS OPHTHALMIC at 08:37

## 2020-11-19 RX ADMIN — UMECLIDINIUM 1 PUFF: 62.5 AEROSOL, POWDER ORAL at 08:37

## 2020-11-19 RX ADMIN — FAMOTIDINE 20 MG: 10 INJECTION INTRAVENOUS at 08:38

## 2020-11-19 RX ADMIN — INSULIN ASPART 1 UNITS: 100 INJECTION, SOLUTION INTRAVENOUS; SUBCUTANEOUS at 11:00

## 2020-11-19 RX ADMIN — APIXABAN 5 MG: 5 TABLET, FILM COATED ORAL at 08:34

## 2020-11-19 RX ADMIN — LEVOTHYROXINE SODIUM 50 MCG: 50 TABLET ORAL at 06:34

## 2020-11-19 RX ADMIN — Medication 2 SPRAY: at 21:21

## 2020-11-19 RX ADMIN — DEXTRAN 70, GLYCERIN, HYPROMELLOSE 1 DROP: 1; 2; 3 SOLUTION/ DROPS OPHTHALMIC at 21:21

## 2020-11-19 RX ADMIN — STANDARDIZED SENNA CONCENTRATE 1 TABLET: 8.6 TABLET ORAL at 08:34

## 2020-11-19 RX ADMIN — INSULIN ASPART 1 UNITS: 100 INJECTION, SOLUTION INTRAVENOUS; SUBCUTANEOUS at 16:28

## 2020-11-19 RX ADMIN — Medication 2 SPRAY: at 08:38

## 2020-11-19 RX ADMIN — APIXABAN 5 MG: 5 TABLET, FILM COATED ORAL at 21:09

## 2020-11-19 RX ADMIN — FLUTICASONE FUROATE AND VILANTEROL TRIFENATATE 1 PUFF: 200; 25 POWDER RESPIRATORY (INHALATION) at 08:37

## 2020-11-19 RX ADMIN — ASPIRIN 81 MG: 81 TABLET, CHEWABLE ORAL at 08:34

## 2020-11-19 RX ADMIN — DEXTRAN 70, GLYCERIN, HYPROMELLOSE 1 DROP: 1; 2; 3 SOLUTION/ DROPS OPHTHALMIC at 12:57

## 2020-11-19 RX ADMIN — METOPROLOL TARTRATE 12.5 MG: 25 TABLET, FILM COATED ORAL at 08:33

## 2020-11-19 RX ADMIN — INSULIN ASPART 1 UNITS: 100 INJECTION, SOLUTION INTRAVENOUS; SUBCUTANEOUS at 01:31

## 2020-11-19 RX ADMIN — METOPROLOL TARTRATE 12.5 MG: 25 TABLET, FILM COATED ORAL at 21:06

## 2020-11-19 RX ADMIN — PAROXETINE 10 MG: 10 TABLET, FILM COATED ORAL at 08:33

## 2020-11-19 RX ADMIN — Medication 2 SPRAY: at 16:16

## 2020-11-19 RX ADMIN — DEXTRAN 70, GLYCERIN, HYPROMELLOSE 1 DROP: 1; 2; 3 SOLUTION/ DROPS OPHTHALMIC at 16:28

## 2020-11-19 ASSESSMENT — ACTIVITIES OF DAILY LIVING (ADL)
ADLS_ACUITY_SCORE: 23
ADLS_ACUITY_SCORE: 20
ADLS_ACUITY_SCORE: 23
ADLS_ACUITY_SCORE: 23

## 2020-11-19 NOTE — PLAN OF CARE
"OT: Orders received. Chart reviewed including report of patient's function.. Per chart: PT phoned pt's site RN Carolina (# in care coordinator note) and learned that at baseline pt dependent with all cares except can feed self but sometimes intermittently requires assist. She is christo dependent but generally refuses to sit up (resists per RN and \"doesn't bend well\" per RN) and does not sit up in a WC at baseline. Does not appear to have acute OT needs.  Will complete orders.  "

## 2020-11-19 NOTE — PROGRESS NOTES
[facility]  Hospitalist Progress Note   11/19/2020          Assessment and Plan:       Carolina Frances is a 79 year old female with medical history significant for COPD/emphysema with chronic hypoxemic respiratory failure on 4 LPM NC O2, depression, chronic venous insufficiency, paroxysmal A. fib, hypothyroidism, oropharyngeal dysphagia admitted on 11/11/2020 from outside hospital due to lack of beds for further management.    COVID 19 pneumonia  Acute hypoxic respiratory failure s/p noninvasive mechanical ventilation-multifactorial.  COPD/emphysema  Chronic hypoxemic respiratory failure on 2 LPM NC O2  Possible obstructive sleep apnea.  COVID-19 on 11/8 came back positive as per report. At nursing facility O2 sats ~70%'s. EMS put her on 15 LPM O2 but was still hypoxic. Procalcitonin was 0.11. WBC 4. CRP 62.86.  D-dimer 416, lactic acid 1.1. Chest x-ray from outside hospital- chronic upper lobe scarring and bilateral interstitial opacities.  COVID-19 PCR positive from 11/10. Considered intubation 2/2 progressive hypoxia but able to maintain on BiPAP, weaned off BiPAP after 48 hours  to high flow nasal oxygen on 11/14.  - IMC status  - Continue HFNC, wean off as tolerated, sats good 11/19 but still on 75% 35 LPM  - dexamethasone 6 mg po daily  - s/p remdesivir course, completed 11/15  - pantoprazole 40 mg daily for ppx while on steroids  - hold abx, low threshold to start  - ID and Pulmonary following, appreciate assistance   - continue Breo ellipta, incruse ellipta, prn combivent prn  - CT PE on hold as cannot transport to radiology with high flow O2, hold for now  - WBC increased 11/19, afebrile. Will monitor    NSTEMI, likely type II demand ischemia.  Acute HFrEF (unknown chronicity)  Troponin at outside ED slightly elevated per report. POC cardiac ultrasound showed no pericardial effusion.   *echo (repeat) 11/17 with EF 45-50, could not assess RWMA. RV fn appeared mild-mod reduced (previous echo  30-35% but unclear and very poor quality)  - Troponin 0.036 initial, peaked to 2.0 and trended down to 0.066.  - telemetry  - s/p lasix iv, see MAR, last received on 11/16.   - discontinue heparin (per cards recs), change to eliquis 5 mg BID for afib (below)  - repeat echo 11/17 as above, EF 45-50%, unable to assess WMA  - BNP 1301 (normal) 11/17  - continue ASA 81 mg daily, atorvastatin 40 mg at HS, metoprolol 12.5 mg BID  - I/O's, daily weights     Paroxysmal A. Fib with intermittent RVR.  Hypertension  HLD  Has been having intermittent episodes of A. fib with RVR, RRT's and receiving IV metoprolol.  On and off sinus rhythm. TSH within normal limits.   - change metoprolol to 12.5 mg BID  - eliquis 5 mg BID  - telemetry, NSR 11/19  - repeat echo results 11/17 as above    Delirium from medical illness, hospitalization.  likely cognitive impairment.  Patient resident of [facility], per discussion with brother Todd he last met her few years back.  He is unclear about her baseline cognitive status. Intermittent agitation during hospital stay, now improved  - states she lives by self in apartment with help. Ambulates well, occasionally uses walker (none of this is correct)  - OT has seen, no needs given largely total cares at home  - suspect she is currently at her baseline 11/19    Nutrition  Oropharyngeal dysphagia  Discussed with patient's brother Todd on 11/13 and he would like to start tube feeds if need to.  - seen by SLP, rec nectar thick liquids by spoon    Physical deconditioning from ongoing medical illness, obesity.  Patient resident of nursing facility.  - OT has seen, no intervention needed    Acute anemia.  Unclear baseline.  Hemoglobin at outside ED 11.5, in hospital 9-11  - hgb down 11.8->10/2 11/19. No blood loss noted. Monitor closely    Hypothyroidism  Continue PTA levothyroxine   - TSH within normal limits.    Depression  Continue PTA Paxil    Chronic venous insufficiency  ACE wraps prn and limb  "elevation     Obesity with a BMI of 29.86.  Will need lifestyle modification with diet and exercise as able to.  Will need to be evaluated for sleep studies as outpatient.    Chronic constipation.  As needed bowel regimen.    Goals of care discussion   Discussed with patient's brother Todd on 11/13 [reports he makes medical decisions for Carolina] did discuss critical illness and prognosis in the setting of multiple medical comorbid condition.  Expressed understanding regarding current clinical situation and recommends \"to do what needs to be done to keep going\".       DVT Prophylaxis:  eliquis  Poon Catheter: not present  Code Status:   Full code, discussed with patient's brother.  Discharge disposition >3 days pending clinical improvement, can't transfer to [facility] 2/2 O2 needs and no ICU beds     Discussed with patient, RN    Alexander Dow M.D.  Hospitalist  Pager [phone]  Text Page          Interval History:      Overnight events reviewed. Doing ok. Feels better. Denies cp/sob. Denies abdominal pain         Physical Exam:        Physical Exam   Temp:  [97.5  F (36.4  C)-98.4  F (36.9  C)] 98.1  F (36.7  C)  Pulse:  [67-80] 78  Resp:  [11-23] 21  BP: (116-143)/() 135/67  FiO2 (%):  [70 %-80 %] 75 %  SpO2:  [85 %-100 %] 93 %    Intake/Output Summary (Last 24 hours) at 11/12/2020 1257  Last data filed at 11/12/2020 0400  Gross per 24 hour   Intake 600 ml   Output --   Net 600 ml       Admission Weight: 83.9 kg (185 lb)  Current Weight: 83.9 kg (185 lb)    PHYSICAL EXAM  GENERAL up and awake  HEART: RRR, difficult exam with HFNC   LUNGS: clear anteriorly but very limited exam, non-laboured  NEURO: Moving all extremities.  EXTREMITIES: no presacral or extremity edema  SKIN: Warm, dry.         Medications:          apixaban ANTICOAGULANT  5 mg Oral BID     aspirin  81 mg Oral Daily     atorvastatin  40 mg Oral At Bedtime     dexamethasone  6 mg Intravenous Q24H     famotidine  20 mg Intravenous Q12H "     fluticasone-vilanterol  1 puff Inhalation Daily     hypromellose-dextran  1 drop Left Eye 4x Daily     insulin aspart  1-6 Units Subcutaneous Q4H     iopamidol  78 mL Intravenous Once     levothyroxine  50 mcg Oral Daily     metoprolol tartrate  12.5 mg Oral BID     midazolam  0.5 mg Intravenous Once     PARoxetine  10 mg Oral QAM     polyethylene glycol  17 g Oral Daily     sodium chloride 0.9 %  100 mL Intravenous Once     sennosides  1 tablet Oral BID     sodium chloride  2 spray Both Nostrils TID     sodium chloride (PF)  10 mL Intravenous Once     sodium chloride (PF)  3 mL Intracatheter Q8H     sodium chloride (PF)  3 mL Intracatheter Q8H     umeclidinium  1 puff Inhalation Daily     acetaminophen, acetaminophen, albuterol, artificial tears ophthalmic solution, glucose **OR** dextrose **OR** glucagon, hydrALAZINE, ipratropium-albuterol, labetalol, lidocaine 4%, lidocaine 4%, lidocaine (buffered or not buffered), lidocaine (buffered or not buffered), - MEDICATION INSTRUCTIONS -, metoprolol, metoprolol, metoprolol, nitroGLYcerin, - MEDICATION INSTRUCTIONS -, ondansetron **OR** ondansetron, - MEDICATION INSTRUCTIONS -, polyethylene glycol, sodium chloride (PF), sodium chloride (PF)         Data:      All new lab and imaging data was reviewed.

## 2020-11-19 NOTE — PROGRESS NOTES
[facility]  Infectious Disease Progress Note          Assessment and Plan:   IMPRESSION:   1.  A 79-year-old female with acute COVID-19 pneumonia, progressive respiratory insufficiency, no clear secondary or other infection.   2.  Respiratory failure due to COVID-19 despite interventions.   3.  Obesity.   4.  Chronic venous stasis disease without obvious cellulitis.   5.  Chronic obstructive lung disease.      RECOMMENDATIONS:   1.  Continue remdesivir, on day 5, steroids, anticoagulation and nonspecific respiratory and other treatments.   2.  Convalescent plasma both not available and out of favor at present, as multiple progressive studies and data suggest no benefits and currently no supply anyway, not indicated at this point.  No indication for intervening with tocilizumab based on lab profile.   3.  Agree no antibiotics for now, but low threshold to add, especially given underlying COPD, but procalcitonin low and no obvious infection.   Conceivably extend remdesivir although labs etc suggest maybe mostly at lung damage rather than active viral issue, discontinue remdesivir, further the case CRP now near nl    still sleepy, ill, high flow O2 still needed  CT chest pending as not able to go for it based on hypoxia        Interval History:   no new complaints confused, no fever no + cxs,  LFTs and creat OK  Needing high flow O2 still   crp all the way down to 9 WBC 13(steroids)              Medications:       apixaban ANTICOAGULANT  5 mg Oral BID     aspirin  81 mg Oral Daily     atorvastatin  40 mg Oral At Bedtime     dexamethasone  6 mg Intravenous Q24H     famotidine  20 mg Intravenous Q12H     fluticasone-vilanterol  1 puff Inhalation Daily     hypromellose-dextran  1 drop Left Eye 4x Daily     insulin aspart  1-6 Units Subcutaneous Q4H     iopamidol  78 mL Intravenous Once     levothyroxine  50 mcg Oral Daily     metoprolol tartrate  12.5 mg Oral BID     midazolam  0.5 mg Intravenous  "Once     PARoxetine  10 mg Oral QAM     polyethylene glycol  17 g Oral Daily     sodium chloride 0.9 %  100 mL Intravenous Once     sennosides  1 tablet Oral BID     sodium chloride  2 spray Both Nostrils TID     sodium chloride (PF)  10 mL Intravenous Once     sodium chloride (PF)  3 mL Intracatheter Q8H     sodium chloride (PF)  3 mL Intracatheter Q8H     umeclidinium  1 puff Inhalation Daily                  Physical Exam:   Blood pressure (!) 140/110, pulse 79, temperature 98.4  F (36.9  C), temperature source Oral, resp. rate 22, height 1.676 m (5' 6\"), weight 78.8 kg (173 lb 11.6 oz), SpO2 99 %.  Wt Readings from Last 2 Encounters:   11/16/20 78.8 kg (173 lb 11.6 oz)     Vital Signs with Ranges  Temp:  [97.5  F (36.4  C)-98.4  F (36.9  C)] 98.4  F (36.9  C)  Pulse:  [62-80] 79  Resp:  [11-23] 22  BP: (116-143)/() 140/110  FiO2 (%):  [70 %] 70 %  SpO2:  [91 %-100 %] 99 %    Constitutional: Awake, alert, confusede, no apparent distress   Lungs: Congestion to auscultation bilaterally, no crackles or wheezing   Cardiovascular: Regular rate and rhythm, normal S1 and S2, and no murmur noted   Abdomen: Normal bowel sounds, soft, non-distended, non-tender   Skin: No rashes, no cyanosis, no edema   Other:           Data:   All microbiology laboratory data reviewed.  Recent Labs   Lab Test 11/19/20  0711 11/18/20  0651 11/16/20  0734   WBC 13.0* 10.2 10.1   HGB 10.2* 11.8 11.4*   HCT 33.6* 38.8 37.1   MCV 97 99 98    350 338     Recent Labs   Lab Test 11/19/20  0711 11/18/20  0651 11/17/20  0749   CR 0.47* 0.57 0.49*     Recent Labs   Lab Test 11/18/20  0651   SED 33*     No lab results found.    Invalid input(s):     "

## 2020-11-19 NOTE — PLAN OF CARE
COVID +, Disoriented to time & place. Very forgetful. VSS ex on HFNC @ 70% & 35L. Lung sounds diminished/ crackles. Congested nonproductive cough. Bowel sounds active,  Purewick in place w/ adequate urine output. Right arm contracted, brace in place. Assist x2 turn @ repo. Pt occasionally get agitated with repositioning.  DD1 nectar thicken liquid diet. Pt tolerating and ate most of meals. Total feed. OT ordered. Denies pain. Heparin gtt running @ 600 units, plan to switch to Xarelto later this evening. Q4hrs blood sugars. Tele: NSR w/ PVC

## 2020-11-19 NOTE — PROGRESS NOTES
"Focus: Skin check:  joseph surface of severely contracted Lt hand  S: Follow-up for above focus. History, progress notes and order reviewed. Per pt she washes her hand \"occasionally\" States finger       are painful to be moved  O: Lt hand severely contractured. 3rd, 4th and 5th fingers arthritic, bent into joseph surface and with little maneuver abiltiy between       Fingers. Skin is intact, with blanchable erythema. Crease @ base  of palm skin fold and web of fingers with layer of thick yellow         Dirty slough skin and dirt.   A: Lt hand skin: No PI noted. Just needs hygiene.   I:  Hand gently massaged. Using q-tip and finger gently removed scum from base of palm and fingers of Lt hand       Followed by using Bag bath sheet and pulling single layer between skin folds.   P:  1.Continue Daily  Lt hand cleaning. Don't be aggressive in attempting to pull fingers/palm of hand open. Use a Q-tip and single l               layer of bag bath to pull between folds for cleaning.       2. WOC will sign off. Please re-consult if additional assistance required.    Nusrat Shaw WOC RN          "

## 2020-11-19 NOTE — PLAN OF CARE
Pt disoriented to place and time. VSS on HFNC 70% O2 at 35 lpm. Tele NSR. LS diminished. Bowels active, flatus+. Voiding adequately, incontinent. Purewick removed per redness in margi area. Extremities contracted, R arm in brace. Turned q2h. Denies pain. Tolerating DD1 w/ nectar thick liquids. Up w/ lift.

## 2020-11-20 ENCOUNTER — APPOINTMENT (OUTPATIENT)
Dept: GENERAL RADIOLOGY | Facility: CLINIC | Age: 80
DRG: 177 | End: 2020-11-20
Attending: INTERNAL MEDICINE
Payer: COMMERCIAL

## 2020-11-20 LAB
ANION GAP SERPL CALCULATED.3IONS-SCNC: 2 MMOL/L (ref 3–14)
BASOPHILS # BLD AUTO: 0 10E9/L (ref 0–0.2)
BASOPHILS NFR BLD AUTO: 0.1 %
BUN SERPL-MCNC: 18 MG/DL (ref 7–30)
CALCIUM SERPL-MCNC: 8.3 MG/DL (ref 8.5–10.1)
CHLORIDE SERPL-SCNC: 99 MMOL/L (ref 94–109)
CO2 SERPL-SCNC: 33 MMOL/L (ref 20–32)
CREAT SERPL-MCNC: 0.54 MG/DL (ref 0.52–1.04)
CRP SERPL-MCNC: 6.2 MG/L (ref 0–8)
DIFFERENTIAL METHOD BLD: ABNORMAL
EOSINOPHIL # BLD AUTO: 0 10E9/L (ref 0–0.7)
EOSINOPHIL NFR BLD AUTO: 0 %
ERYTHROCYTE [DISTWIDTH] IN BLOOD BY AUTOMATED COUNT: 13.8 % (ref 10–15)
GFR SERPL CREATININE-BSD FRML MDRD: >90 ML/MIN/{1.73_M2}
GLUCOSE BLDC GLUCOMTR-MCNC: 135 MG/DL (ref 70–99)
GLUCOSE BLDC GLUCOMTR-MCNC: 137 MG/DL (ref 70–99)
GLUCOSE BLDC GLUCOMTR-MCNC: 155 MG/DL (ref 70–99)
GLUCOSE BLDC GLUCOMTR-MCNC: 161 MG/DL (ref 70–99)
GLUCOSE BLDC GLUCOMTR-MCNC: 174 MG/DL (ref 70–99)
GLUCOSE SERPL-MCNC: 153 MG/DL (ref 70–99)
HCT VFR BLD AUTO: 34.9 % (ref 35–47)
HGB BLD-MCNC: 10.8 G/DL (ref 11.7–15.7)
IMM GRANULOCYTES # BLD: 0.3 10E9/L (ref 0–0.4)
IMM GRANULOCYTES NFR BLD: 1.9 %
LYMPHOCYTES # BLD AUTO: 0.9 10E9/L (ref 0.8–5.3)
LYMPHOCYTES NFR BLD AUTO: 6.6 %
MCH RBC QN AUTO: 30.2 PG (ref 26.5–33)
MCHC RBC AUTO-ENTMCNC: 30.9 G/DL (ref 31.5–36.5)
MCV RBC AUTO: 98 FL (ref 78–100)
MONOCYTES # BLD AUTO: 0.5 10E9/L (ref 0–1.3)
MONOCYTES NFR BLD AUTO: 3.3 %
NEUTROPHILS # BLD AUTO: 12.4 10E9/L (ref 1.6–8.3)
NEUTROPHILS NFR BLD AUTO: 88.1 %
NRBC # BLD AUTO: 0 10*3/UL
NRBC BLD AUTO-RTO: 0 /100
PLATELET # BLD AUTO: 334 10E9/L (ref 150–450)
POTASSIUM SERPL-SCNC: 4.6 MMOL/L (ref 3.4–5.3)
RBC # BLD AUTO: 3.58 10E12/L (ref 3.8–5.2)
SODIUM SERPL-SCNC: 134 MMOL/L (ref 133–144)
WBC # BLD AUTO: 14 10E9/L (ref 4–11)

## 2020-11-20 PROCEDURE — 250N000013 HC RX MED GY IP 250 OP 250 PS 637: Performed by: HOSPITALIST

## 2020-11-20 PROCEDURE — 999N000215 HC STATISTIC HFNC ADULT NON-CPAP

## 2020-11-20 PROCEDURE — 36415 COLL VENOUS BLD VENIPUNCTURE: CPT | Performed by: INTERNAL MEDICINE

## 2020-11-20 PROCEDURE — 99232 SBSQ HOSP IP/OBS MODERATE 35: CPT | Performed by: INTERNAL MEDICINE

## 2020-11-20 PROCEDURE — 999N000157 HC STATISTIC RCP TIME EA 10 MIN

## 2020-11-20 PROCEDURE — 80048 BASIC METABOLIC PNL TOTAL CA: CPT | Performed by: INTERNAL MEDICINE

## 2020-11-20 PROCEDURE — 999N001017 HC STATISTIC GLUCOSE BY METER IP

## 2020-11-20 PROCEDURE — 250N000013 HC RX MED GY IP 250 OP 250 PS 637: Performed by: INTERNAL MEDICINE

## 2020-11-20 PROCEDURE — 250N000012 HC RX MED GY IP 250 OP 636 PS 637: Performed by: INTERNAL MEDICINE

## 2020-11-20 PROCEDURE — 120N000013 HC R&B IMCU

## 2020-11-20 PROCEDURE — 86140 C-REACTIVE PROTEIN: CPT | Performed by: INTERNAL MEDICINE

## 2020-11-20 PROCEDURE — 250N000011 HC RX IP 250 OP 636: Performed by: INTERNAL MEDICINE

## 2020-11-20 PROCEDURE — 85025 COMPLETE CBC W/AUTO DIFF WBC: CPT | Performed by: INTERNAL MEDICINE

## 2020-11-20 PROCEDURE — 120N000001 HC R&B MED SURG/OB

## 2020-11-20 PROCEDURE — 71045 X-RAY EXAM CHEST 1 VIEW: CPT

## 2020-11-20 RX ORDER — PIPERACILLIN SODIUM, TAZOBACTAM SODIUM 3; .375 G/15ML; G/15ML
3.38 INJECTION, POWDER, LYOPHILIZED, FOR SOLUTION INTRAVENOUS EVERY 6 HOURS
Status: DISCONTINUED | OUTPATIENT
Start: 2020-11-20 | End: 2020-11-22

## 2020-11-20 RX ADMIN — PIPERACILLIN AND TAZOBACTAM 3.38 G: 3; .375 INJECTION, POWDER, LYOPHILIZED, FOR SOLUTION INTRAVENOUS at 20:00

## 2020-11-20 RX ADMIN — Medication 2 SPRAY: at 17:51

## 2020-11-20 RX ADMIN — FLUTICASONE FUROATE AND VILANTEROL TRIFENATATE 1 PUFF: 200; 25 POWDER RESPIRATORY (INHALATION) at 08:35

## 2020-11-20 RX ADMIN — INSULIN ASPART 1 UNITS: 100 INJECTION, SOLUTION INTRAVENOUS; SUBCUTANEOUS at 00:03

## 2020-11-20 RX ADMIN — PIPERACILLIN AND TAZOBACTAM 3.38 G: 3; .375 INJECTION, POWDER, LYOPHILIZED, FOR SOLUTION INTRAVENOUS at 11:43

## 2020-11-20 RX ADMIN — METOPROLOL TARTRATE 12.5 MG: 25 TABLET, FILM COATED ORAL at 20:17

## 2020-11-20 RX ADMIN — ASPIRIN 81 MG: 81 TABLET, CHEWABLE ORAL at 11:43

## 2020-11-20 RX ADMIN — INSULIN ASPART 1 UNITS: 100 INJECTION, SOLUTION INTRAVENOUS; SUBCUTANEOUS at 08:43

## 2020-11-20 RX ADMIN — LEVOTHYROXINE SODIUM 50 MCG: 50 TABLET ORAL at 07:14

## 2020-11-20 RX ADMIN — UMECLIDINIUM 1 PUFF: 62.5 AEROSOL, POWDER ORAL at 08:36

## 2020-11-20 RX ADMIN — INSULIN ASPART 1 UNITS: 100 INJECTION, SOLUTION INTRAVENOUS; SUBCUTANEOUS at 04:30

## 2020-11-20 RX ADMIN — DEXAMETHASONE 6 MG: 2 TABLET ORAL at 11:43

## 2020-11-20 RX ADMIN — APIXABAN 5 MG: 5 TABLET, FILM COATED ORAL at 11:47

## 2020-11-20 RX ADMIN — PANTOPRAZOLE SODIUM 40 MG: 40 TABLET, DELAYED RELEASE ORAL at 07:14

## 2020-11-20 RX ADMIN — Medication 2 SPRAY: at 08:36

## 2020-11-20 RX ADMIN — STANDARDIZED SENNA CONCENTRATE 1 TABLET: 8.6 TABLET ORAL at 11:43

## 2020-11-20 RX ADMIN — METOPROLOL TARTRATE 12.5 MG: 25 TABLET, FILM COATED ORAL at 11:47

## 2020-11-20 RX ADMIN — DEXTRAN 70, GLYCERIN, HYPROMELLOSE 1 DROP: 1; 2; 3 SOLUTION/ DROPS OPHTHALMIC at 17:52

## 2020-11-20 RX ADMIN — APIXABAN 5 MG: 5 TABLET, FILM COATED ORAL at 20:18

## 2020-11-20 RX ADMIN — ATORVASTATIN CALCIUM 40 MG: 40 TABLET, FILM COATED ORAL at 20:18

## 2020-11-20 RX ADMIN — PAROXETINE 10 MG: 10 TABLET, FILM COATED ORAL at 11:43

## 2020-11-20 RX ADMIN — DEXTRAN 70, GLYCERIN, HYPROMELLOSE 1 DROP: 1; 2; 3 SOLUTION/ DROPS OPHTHALMIC at 08:35

## 2020-11-20 RX ADMIN — DEXAMETHASONE 6 MG: 2 TABLET ORAL at 20:18

## 2020-11-20 RX ADMIN — INSULIN ASPART 1 UNITS: 100 INJECTION, SOLUTION INTRAVENOUS; SUBCUTANEOUS at 20:19

## 2020-11-20 RX ADMIN — ACETAMINOPHEN 650 MG: 325 TABLET, FILM COATED ORAL at 07:14

## 2020-11-20 ASSESSMENT — ACTIVITIES OF DAILY LIVING (ADL)
ADLS_ACUITY_SCORE: 24
ADLS_ACUITY_SCORE: 24
ADLS_ACUITY_SCORE: 23

## 2020-11-20 ASSESSMENT — MIFFLIN-ST. JEOR: SCORE: 1362.75

## 2020-11-20 NOTE — PLAN OF CARE
Forgetful to time & situation-reoriented. VSS on 35L HFNC @ Fio2 75%. Loose non productive cough. Encouraged Aerobika & IS use. Tele SR. Fecal & urine incontinence. Cooperative cares. Turned/repositioned every two hrs/. Right arm/hand-contracture, brace on. Denies numbness/tingling. Pain managed w/ prn tylenol Continue to monitor.

## 2020-11-20 NOTE — PROGRESS NOTES
Pulmonary Note        Last imaging 11/12; CXR pending.   Acute on chronic resp failure and hypoxemia, hypercapnia after Covid in NH resident; if infiltrates are worse, would order high res CT and favor abx to cover nosocomial or aspiration/gnr in this frail woman.   Will defer to ID.     Will comment tomorrow after CXR.     [facility]  [phone]

## 2020-11-20 NOTE — PLAN OF CARE
COVID +, Disoriented to time & place. Very forgetful. VSS ex on HFNC @ 80% & 35L. Lung sounds diminished/ crackles. Congested nonproductive cough. Bowel sounds active,  Incontinent of bowels & bladder. Purewick removed due to erythema in groin. +large BM.  Right arm contracted, brace in place. Assist x2 turn @ repo. Pt occasionally get agitated with repositioning.  DD1 nectar thicken liquid diet- Total feed. OT ordered. Denies pain. Q4hrs blood sugars. Tele: NSR w/ PVC

## 2020-11-20 NOTE — PROGRESS NOTES
[facility]  Hospitalist Progress Note   11/20/2020          Assessment and Plan:       Carolina Frances is a 79 year old female with medical history significant for COPD/emphysema with chronic hypoxemic respiratory failure on 4 LPM NC O2, depression, chronic venous insufficiency, paroxysmal A. fib, hypothyroidism, oropharyngeal dysphagia admitted on 11/11/2020 from outside hospital due to lack of beds for further management.    COVID 19 pneumonia  Acute hypoxic respiratory failure s/p noninvasive mechanical ventilation-multifactorial.  COPD/emphysema  Chronic hypoxemic respiratory failure on 2 LPM NC O2  Possible obstructive sleep apnea.  COVID-19 on 11/8 came back positive as per report. At nursing facility O2 sats ~70%'s. EMS put her on 15 LPM O2 but was still hypoxic. Procalcitonin was 0.11. WBC 4. CRP 62.86. Lactic acid 1.1. Chest x-ray from outside hospital- chronic upper lobe scarring and bilateral interstitial opacities.  COVID-19 PCR positive from 11/10. Considered intubation 2/2 progressive hypoxia but able to maintain on BiPAP, weaned off BiPAP after 48 hours  to high flow nasal oxygen on 11/14.  - IMC status  - Continue HFNC, wean off as tolerated, sats good 11/19 but still on 75% 35 LPM  - dexamethasone 6 mg po daily  - s/p remdesivir course, completed 11/15  - pantoprazole 40 mg daily for ppx while on steroids  - ID and Pulmonary following, appreciate assistance   - continue Breo ellipta, incruse ellipta, prn combivent prn  - CT PE on hold as cannot transport to radiology with high flow O2, hold for now  - WBC increasing 11/20, per ID and pulmonary started zosyn 11/20. CXR is improved from previous 11/20    NSTEMI, likely type II demand ischemia.  Acute HFrEF (unknown chronicity)  Troponin at outside ED slightly elevated per report. POC cardiac ultrasound showed no pericardial effusion.   *echo (repeat) 11/17 with EF 45-50, could not assess RWMA. RV fn appeared mild-mod reduced  (previous echo 30-35% but unclear and very poor quality)  - Troponin 0.036 initial, peaked to 2.0 and trended down to 0.066.  - telemetry  - s/p lasix iv, see MAR, last received on 11/16.   - discontinue heparin (per cards recs), change to eliquis 5 mg BID for afib (below)  - repeat echo 11/17 as above, EF 45-50%, unable to assess WMA  - BNP 1301 (normal) 11/17  - continue ASA 81 mg daily, atorvastatin 40 mg at HS, metoprolol 12.5 mg BID  - I/O's, daily weights   - repeat BNP in the am    Paroxysmal A. Fib with intermittent RVR.  Hypertension  HLD  Has been having intermittent episodes of A. fib with RVR, RRT's and receiving IV metoprolol.  On and off sinus rhythm. TSH within normal limits.   - metoprolol to 12.5 mg BID  - eliquis 5 mg BID  - telemetry, NSR 11/20  - echo repeated 11/17 as above    Delirium from medical illness, hospitalization.  likely cognitive impairment.  Patient resident of [facility], per discussion with brother Todd he last met her few years back.  He is unclear about her baseline cognitive status. Intermittent agitation during hospital stay, now improved  - states she lives by self in apartment with help. Ambulates well, occasionally uses walker (none of this is correct)  - OT has seen, no needs given largely total cares at home  - suspect she is currently at her baseline 11/19    Nutrition  Oropharyngeal dysphagia  Discussed with patient's brother Todd on 11/13 and he would like to start tube feeds if need to.  - seen by SLP, rec nectar thick liquids by spoon    Physical deconditioning from ongoing medical illness, obesity.  Patient resident of nursing facility.  - OT has seen, no intervention needed    Acute anemia.  Unclear baseline.  Hemoglobin at outside ED 11.5, in hospital 9-11  - stable, monitoring    Hypothyroidism  Continue PTA levothyroxine   - TSH within normal limits.    Depression  Continue PTA Paxil    Chronic venous insufficiency  ACE wraps prn and limb elevation  "    Obesity with a BMI of 29.86.  Will need lifestyle modification as outpatient as able    Chronic constipation.  As needed bowel regimen.    Goals of care discussion   Discussed with patient's brother Todd on 11/13 [reports he makes medical decisions for Carolina] did discuss critical illness and prognosis in the setting of multiple medical comorbid condition.  Expressed understanding regarding current clinical situation and recommends \"to do what needs to be done to keep going\".       DVT Prophylaxis:  eliquis  Poon Catheter: not present  Code Status:   Full code, discussed with patient's brother.  Discharge disposition >3 days pending clinical improvement, can't transfer to [facility] 2/2 O2 needs and no ICU beds     Discussed with patient, RN    Alexander Dow M.D.  Hospitalist  Pager [phone]  Text Page          Interval History:      Overnight events reviewed. Doing ok. Sleepy but arousable. Denies cp, feels breathing ok. Eating well per nursing         Physical Exam:        Physical Exam   Temp:  [98.3  F (36.8  C)-98.8  F (37.1  C)] 98.8  F (37.1  C)  Pulse:  [64-88] 75  Resp:  [18-25] 25  BP: (118-157)/(60-93) 118/60  FiO2 (%):  [75 %] 75 %  SpO2:  [78 %-99 %] 93 %    Intake/Output Summary (Last 24 hours) at 11/12/2020 1257  Last data filed at 11/12/2020 0400  Gross per 24 hour   Intake 600 ml   Output --   Net 600 ml       Admission Weight: 83.9 kg (185 lb)  Current Weight: 83.9 kg (185 lb)    PHYSICAL EXAM  GENERAL up and awake  HEART: RRR, difficult exam with HFNC   LUNGS: coarse anteriorly but very limited exam, non-laboured  NEURO: Moving all extremities.  EXTREMITIES: no presacral or extremity edema  SKIN: Warm, dry.         Medications:          apixaban ANTICOAGULANT  5 mg Oral BID     aspirin  81 mg Oral Daily     atorvastatin  40 mg Oral At Bedtime     dexamethasone  6 mg Oral Q12H LUCY     fluticasone-vilanterol  1 puff Inhalation Daily     hypromellose-dextran  1 drop Left Eye 4x Daily     " insulin aspart  1-6 Units Subcutaneous Q4H     iopamidol  78 mL Intravenous Once     levothyroxine  50 mcg Oral Daily     metoprolol tartrate  12.5 mg Oral BID     midazolam  0.5 mg Intravenous Once     pantoprazole  40 mg Oral QAM AC     PARoxetine  10 mg Oral QAM     piperacillin-tazobactam  3.375 g Intravenous Q6H     polyethylene glycol  17 g Oral Daily     sodium chloride 0.9 %  100 mL Intravenous Once     sennosides  1 tablet Oral BID     sodium chloride  2 spray Both Nostrils TID     sodium chloride (PF)  10 mL Intravenous Once     sodium chloride (PF)  3 mL Intracatheter Q8H     umeclidinium  1 puff Inhalation Daily     acetaminophen, acetaminophen, albuterol, artificial tears ophthalmic solution, glucose **OR** dextrose **OR** glucagon, hydrALAZINE, ipratropium-albuterol, labetalol, lidocaine 4%, lidocaine (buffered or not buffered), - MEDICATION INSTRUCTIONS -, metoprolol, metoprolol, metoprolol, nitroGLYcerin, - MEDICATION INSTRUCTIONS -, ondansetron **OR** ondansetron, - MEDICATION INSTRUCTIONS -, polyethylene glycol, sodium chloride (PF)         Data:      All new lab and imaging data was reviewed.

## 2020-11-20 NOTE — PROGRESS NOTES
[facility]  Infectious Disease Progress Note          Assessment and Plan:   IMPRESSION:   1.  A 79-year-old female with acute COVID-19 pneumonia, progressive respiratory insufficiency, no clear secondary or other infection.   2.  Respiratory failure due to COVID-19 despite interventions.   3.  Obesity.   4.  Chronic venous stasis disease without obvious cellulitis.   5.  Chronic obstructive lung disease.      RECOMMENDATIONS:   1.  Continue remdesivir, on day 5, steroids, anticoagulation and nonspecific respiratory and other treatments.   2.  Convalescent plasma both not available and out of favor at present, as multiple progressive studies and data suggest no benefits and currently no supply anyway, not indicated at this point.  No indication for intervening with tocilizumab based on lab profile.   3.  Still doubt bacterial but reasonable to cover as Dr Nagel states so trial zosyn   Conceivably extend remdesivir although labs etc suggest maybe mostly at lung damage rather than active viral issue, discontinue remdesivir, further the case CRP now near nl    still sleepy, ill, high flow O2 still needed  CT chest pending as not able to go for it based on hypoxia  Call if issues this WE        Interval History:   no new complaints confused, no fever no + cxs,  LFTs and creat OK  Needing high flow O2 still   crp all the way down to 9 WBC 13(steroids)              Medications:       apixaban ANTICOAGULANT  5 mg Oral BID     aspirin  81 mg Oral Daily     atorvastatin  40 mg Oral At Bedtime     dexamethasone  6 mg Oral Q12H LUCY     fluticasone-vilanterol  1 puff Inhalation Daily     hypromellose-dextran  1 drop Left Eye 4x Daily     insulin aspart  1-6 Units Subcutaneous Q4H     iopamidol  78 mL Intravenous Once     levothyroxine  50 mcg Oral Daily     metoprolol tartrate  12.5 mg Oral BID     midazolam  0.5 mg Intravenous Once     pantoprazole  40 mg Oral QAM AC     PARoxetine  10 mg Oral QAM      "piperacillin-tazobactam  3.375 g Intravenous Q6H     polyethylene glycol  17 g Oral Daily     sodium chloride 0.9 %  100 mL Intravenous Once     sennosides  1 tablet Oral BID     sodium chloride  2 spray Both Nostrils TID     sodium chloride (PF)  10 mL Intravenous Once     sodium chloride (PF)  3 mL Intracatheter Q8H     sodium chloride (PF)  3 mL Intracatheter Q8H     umeclidinium  1 puff Inhalation Daily                  Physical Exam:   Blood pressure 122/68, pulse 64, temperature 98.3  F (36.8  C), temperature source Oral, resp. rate 19, height 1.676 m (5' 6\"), weight 86.1 kg (189 lb 13.1 oz), SpO2 99 %.  Wt Readings from Last 2 Encounters:   11/20/20 86.1 kg (189 lb 13.1 oz)     Vital Signs with Ranges  Temp:  [98.1  F (36.7  C)-98.7  F (37.1  C)] 98.3  F (36.8  C)  Pulse:  [64-88] 64  Resp:  [13-24] 19  BP: (120-147)/(62-79) 122/68  FiO2 (%):  [75 %-80 %] 75 %  SpO2:  [78 %-99 %] 99 %    Constitutional: Awake, alert, confusede, no apparent distress   Lungs: Congestion to auscultation bilaterally, no crackles or wheezing   Cardiovascular: Regular rate and rhythm, normal S1 and S2, and no murmur noted   Abdomen: Normal bowel sounds, soft, non-distended, non-tender   Skin: No rashes, no cyanosis, no edema   Other:           Data:   All microbiology laboratory data reviewed.  Recent Labs   Lab Test 11/20/20  0753 11/19/20  0711 11/18/20  0651   WBC 14.0* 13.0* 10.2   HGB 10.8* 10.2* 11.8   HCT 34.9* 33.6* 38.8   MCV 98 97 99    313 350     Recent Labs   Lab Test 11/20/20  0753 11/19/20  0711 11/18/20  0651   CR 0.54 0.47* 0.57     Recent Labs   Lab Test 11/18/20  0651   SED 33*     No lab results found.    Invalid input(s): UC    "

## 2020-11-21 PROBLEM — J96.21 ACUTE ON CHRONIC RESPIRATORY FAILURE WITH HYPOXIA (H): Status: ACTIVE | Noted: 2020-11-21

## 2020-11-21 PROBLEM — J44.1 COPD EXACERBATION (H): Status: ACTIVE | Noted: 2020-11-21

## 2020-11-21 PROBLEM — J12.82 PNEUMONIA DUE TO 2019 NOVEL CORONAVIRUS: Status: ACTIVE | Noted: 2020-11-21

## 2020-11-21 PROBLEM — U07.1 PNEUMONIA DUE TO 2019 NOVEL CORONAVIRUS: Status: ACTIVE | Noted: 2020-11-21

## 2020-11-21 PROBLEM — J96.90 RESPIRATORY FAILURE (H): Status: RESOLVED | Noted: 2020-11-11 | Resolved: 2020-11-21

## 2020-11-21 LAB
BASOPHILS # BLD AUTO: 0 10E9/L (ref 0–0.2)
BASOPHILS NFR BLD AUTO: 0.1 %
DIFFERENTIAL METHOD BLD: ABNORMAL
EOSINOPHIL # BLD AUTO: 0 10E9/L (ref 0–0.7)
EOSINOPHIL NFR BLD AUTO: 0 %
ERYTHROCYTE [DISTWIDTH] IN BLOOD BY AUTOMATED COUNT: 13.9 % (ref 10–15)
GLUCOSE BLDC GLUCOMTR-MCNC: 107 MG/DL (ref 70–99)
GLUCOSE BLDC GLUCOMTR-MCNC: 147 MG/DL (ref 70–99)
GLUCOSE BLDC GLUCOMTR-MCNC: 162 MG/DL (ref 70–99)
GLUCOSE BLDC GLUCOMTR-MCNC: 163 MG/DL (ref 70–99)
GLUCOSE BLDC GLUCOMTR-MCNC: 188 MG/DL (ref 70–99)
GLUCOSE BLDC GLUCOMTR-MCNC: 233 MG/DL (ref 70–99)
HCT VFR BLD AUTO: 33.1 % (ref 35–47)
HGB BLD-MCNC: 10.3 G/DL (ref 11.7–15.7)
IMM GRANULOCYTES # BLD: 0.3 10E9/L (ref 0–0.4)
IMM GRANULOCYTES NFR BLD: 2.2 %
LYMPHOCYTES # BLD AUTO: 0.9 10E9/L (ref 0.8–5.3)
LYMPHOCYTES NFR BLD AUTO: 8.1 %
MCH RBC QN AUTO: 30.1 PG (ref 26.5–33)
MCHC RBC AUTO-ENTMCNC: 31.1 G/DL (ref 31.5–36.5)
MCV RBC AUTO: 97 FL (ref 78–100)
MONOCYTES # BLD AUTO: 0.3 10E9/L (ref 0–1.3)
MONOCYTES NFR BLD AUTO: 2.2 %
NEUTROPHILS # BLD AUTO: 10.2 10E9/L (ref 1.6–8.3)
NEUTROPHILS NFR BLD AUTO: 87.4 %
NRBC # BLD AUTO: 0 10*3/UL
NRBC BLD AUTO-RTO: 0 /100
NT-PROBNP SERPL-MCNC: 1220 PG/ML (ref 0–1800)
PLATELET # BLD AUTO: 311 10E9/L (ref 150–450)
RBC # BLD AUTO: 3.42 10E12/L (ref 3.8–5.2)
WBC # BLD AUTO: 11.6 10E9/L (ref 4–11)

## 2020-11-21 PROCEDURE — 250N000011 HC RX IP 250 OP 636: Performed by: INTERNAL MEDICINE

## 2020-11-21 PROCEDURE — 250N000013 HC RX MED GY IP 250 OP 250 PS 637: Performed by: HOSPITALIST

## 2020-11-21 PROCEDURE — 999N000157 HC STATISTIC RCP TIME EA 10 MIN

## 2020-11-21 PROCEDURE — 250N000013 HC RX MED GY IP 250 OP 250 PS 637: Performed by: INTERNAL MEDICINE

## 2020-11-21 PROCEDURE — 250N000012 HC RX MED GY IP 250 OP 636 PS 637: Performed by: INTERNAL MEDICINE

## 2020-11-21 PROCEDURE — 99233 SBSQ HOSP IP/OBS HIGH 50: CPT | Performed by: INTERNAL MEDICINE

## 2020-11-21 PROCEDURE — 999N001017 HC STATISTIC GLUCOSE BY METER IP

## 2020-11-21 PROCEDURE — 258N000003 HC RX IP 258 OP 636: Performed by: HOSPITALIST

## 2020-11-21 PROCEDURE — 120N000001 HC R&B MED SURG/OB

## 2020-11-21 PROCEDURE — 36415 COLL VENOUS BLD VENIPUNCTURE: CPT | Performed by: INTERNAL MEDICINE

## 2020-11-21 PROCEDURE — 120N000013 HC R&B IMCU

## 2020-11-21 PROCEDURE — 85025 COMPLETE CBC W/AUTO DIFF WBC: CPT | Performed by: INTERNAL MEDICINE

## 2020-11-21 PROCEDURE — 83880 ASSAY OF NATRIURETIC PEPTIDE: CPT | Performed by: INTERNAL MEDICINE

## 2020-11-21 RX ORDER — CARBOXYMETHYLCELLULOSE SODIUM 5 MG/ML
1 SOLUTION/ DROPS OPHTHALMIC 3 TIMES DAILY
Status: DISCONTINUED | OUTPATIENT
Start: 2020-11-21 | End: 2020-11-25 | Stop reason: HOSPADM

## 2020-11-21 RX ADMIN — APIXABAN 5 MG: 5 TABLET, FILM COATED ORAL at 07:52

## 2020-11-21 RX ADMIN — APIXABAN 5 MG: 5 TABLET, FILM COATED ORAL at 21:01

## 2020-11-21 RX ADMIN — STANDARDIZED SENNA CONCENTRATE 1 TABLET: 8.6 TABLET ORAL at 07:52

## 2020-11-21 RX ADMIN — SODIUM CHLORIDE: 9 INJECTION, SOLUTION INTRAVENOUS at 11:51

## 2020-11-21 RX ADMIN — INSULIN ASPART 1 UNITS: 100 INJECTION, SOLUTION INTRAVENOUS; SUBCUTANEOUS at 05:35

## 2020-11-21 RX ADMIN — PANTOPRAZOLE SODIUM 40 MG: 40 TABLET, DELAYED RELEASE ORAL at 07:50

## 2020-11-21 RX ADMIN — DEXTRAN 70, GLYCERIN, HYPROMELLOSE 1 DROP: 1; 2; 3 SOLUTION/ DROPS OPHTHALMIC at 11:36

## 2020-11-21 RX ADMIN — CARBOXYMETHYLCELLULOSE SODIUM 1 DROP: 5 SOLUTION/ DROPS OPHTHALMIC at 18:00

## 2020-11-21 RX ADMIN — Medication 2 SPRAY: at 07:55

## 2020-11-21 RX ADMIN — LEVOTHYROXINE SODIUM 50 MCG: 50 TABLET ORAL at 07:50

## 2020-11-21 RX ADMIN — ASPIRIN 81 MG: 81 TABLET, CHEWABLE ORAL at 07:51

## 2020-11-21 RX ADMIN — INSULIN ASPART 2 UNITS: 100 INJECTION, SOLUTION INTRAVENOUS; SUBCUTANEOUS at 11:36

## 2020-11-21 RX ADMIN — Medication 2 SPRAY: at 01:28

## 2020-11-21 RX ADMIN — PIPERACILLIN AND TAZOBACTAM 3.38 G: 3; .375 INJECTION, POWDER, LYOPHILIZED, FOR SOLUTION INTRAVENOUS at 07:56

## 2020-11-21 RX ADMIN — ATORVASTATIN CALCIUM 40 MG: 40 TABLET, FILM COATED ORAL at 22:08

## 2020-11-21 RX ADMIN — DEXTRAN 70, GLYCERIN, HYPROMELLOSE 1 DROP: 1; 2; 3 SOLUTION/ DROPS OPHTHALMIC at 01:28

## 2020-11-21 RX ADMIN — PIPERACILLIN AND TAZOBACTAM 3.38 G: 3; .375 INJECTION, POWDER, LYOPHILIZED, FOR SOLUTION INTRAVENOUS at 14:58

## 2020-11-21 RX ADMIN — INSULIN ASPART 1 UNITS: 100 INJECTION, SOLUTION INTRAVENOUS; SUBCUTANEOUS at 01:46

## 2020-11-21 RX ADMIN — UMECLIDINIUM 1 PUFF: 62.5 AEROSOL, POWDER ORAL at 07:54

## 2020-11-21 RX ADMIN — METOPROLOL TARTRATE 12.5 MG: 25 TABLET, FILM COATED ORAL at 21:01

## 2020-11-21 RX ADMIN — PIPERACILLIN AND TAZOBACTAM 3.38 G: 3; .375 INJECTION, POWDER, LYOPHILIZED, FOR SOLUTION INTRAVENOUS at 01:29

## 2020-11-21 RX ADMIN — CARBOXYMETHYLCELLULOSE SODIUM 1 DROP: 5 SOLUTION/ DROPS OPHTHALMIC at 22:09

## 2020-11-21 RX ADMIN — METOPROLOL TARTRATE 12.5 MG: 25 TABLET, FILM COATED ORAL at 07:51

## 2020-11-21 RX ADMIN — PIPERACILLIN AND TAZOBACTAM 3.38 G: 3; .375 INJECTION, POWDER, LYOPHILIZED, FOR SOLUTION INTRAVENOUS at 21:06

## 2020-11-21 RX ADMIN — STANDARDIZED SENNA CONCENTRATE 1 TABLET: 8.6 TABLET ORAL at 21:01

## 2020-11-21 RX ADMIN — Medication 2 SPRAY: at 22:09

## 2020-11-21 RX ADMIN — DEXAMETHASONE 6 MG: 2 TABLET ORAL at 07:52

## 2020-11-21 RX ADMIN — PAROXETINE 10 MG: 10 TABLET, FILM COATED ORAL at 07:51

## 2020-11-21 RX ADMIN — DEXTRAN 70, GLYCERIN, HYPROMELLOSE 1 DROP: 1; 2; 3 SOLUTION/ DROPS OPHTHALMIC at 07:55

## 2020-11-21 RX ADMIN — INSULIN ASPART 1 UNITS: 100 INJECTION, SOLUTION INTRAVENOUS; SUBCUTANEOUS at 18:02

## 2020-11-21 RX ADMIN — Medication 2 SPRAY: at 15:13

## 2020-11-21 RX ADMIN — FLUTICASONE FUROATE AND VILANTEROL TRIFENATATE 1 PUFF: 200; 25 POWDER RESPIRATORY (INHALATION) at 07:55

## 2020-11-21 ASSESSMENT — ACTIVITIES OF DAILY LIVING (ADL)
ADLS_ACUITY_SCORE: 26

## 2020-11-21 NOTE — PROGRESS NOTES
Pulmonary Note    CXR reviewed;     Previous infiltrates are better, volumes are still low and chest kyphotic; some residual LLL atelectasis, can't r/o some pneumonia there but o/w better. Weakness, atelectasis, and emphysema seem to be principle remaining problems for her respiratory status. Consider the abx, continue pulmonary toilet, consider rehab stay, we will see her again prn.     [facility]  [phone]

## 2020-11-21 NOTE — PLAN OF CARE
VSS. Afebrile  some sob with repositioning  High flow on 75% incontinent of urine good appetite needs to be feed no coughing with nectar thick fluids. Gets angry with cares  be touched ;elma on to Left hand.

## 2020-11-21 NOTE — PROGRESS NOTES
[facility]  Hospitalist Progress Note   11/21/2020          Assessment and Plan:       79 year old female with medical history significant for COPD/emphysema with chronic hypoxemic respiratory failure on 4 LPM NC O2, depression, chronic venous insufficiency, paroxysmal A. fib, hypothyroidism, oropharyngeal dysphagia admitted on 11/11/2020 from outside hospital due to lack of beds for further management.    COVID 19 pneumonia -- positive 11/8/20   -- will stop Zosyn tomorrow if WBC improved and otherwise stable    Acute on Chronic respiratory failure   -- wean O2 as able to keep sat > 88%    COPD/emphysema w Chronic resp failure on 4 LPM NC O2    Possible obstructive sleep apnea.  - Continue HFNC, wean off as tolerated, sats good 11/19 but still on 75% 35 LPM  - dexamethasone 6 mg po daily  - s/p remdesivir course, completed 11/15  - pantoprazole 40 mg daily for ppx while on steroids  - ID and Pulmonary following, appreciate assistance   - continue Breo ellipta, incruse ellipta, prn combivent prn  - CT PE on hold as cannot transport to radiology with high flow O2, hold for now  - WBC increasing 11/20, per ID and pulmonary started zosyn 11/20. CXR is improved from previous 11/20    NSTEMI, likely type II demand ischemia.  Acute HFrEF (unknown chronicity)  Troponin at outside ED slightly elevated per report. POC cardiac ultrasound showed no pericardial effusion.   *echo (repeat) 11/17 with EF 45-50, could not assess RWMA. RV fn appeared mild-mod reduced (previous echo 30-35% but unclear and very poor quality)  - Troponin 0.036 initial, peaked to 2.0 and trended down to 0.066.  - telemetry  - s/p lasix iv, see MAR, last received on 11/16.   - discontinue heparin (per cards recs), change to eliquis 5 mg BID for afib (below)  - repeat echo 11/17 as above, EF 45-50%, unable to assess WMA  - BNP 1301 (normal) 11/17  - continue ASA 81 mg daily, atorvastatin 40 mg at HS, metoprolol 12.5 mg BID    Paroxysmal  A. Fib with intermittent RVR.  Hypertension  HLD  Has been having intermittent episodes of A. fib with RVR, RRT's and receiving IV metoprolol.  On and off sinus rhythm. TSH within normal limits.   - metoprolol to 12.5 mg BID  - eliquis 5 mg BID  - echo repeated 11/17 as above    Delirium from medical illness, hospitalization.  Mild dementia baseline   Patient resident of [facility], per discussion with brother Todd he last met her few years back.  He is unclear about her baseline cognitive status. Intermittent agitation during hospital stay, now improved  - states she lives by self in apartment with help. Ambulates well, occasionally uses walker (none of this is correct)  - OT has seen, no needs given largely total cares at home  - suspect she is currently at her baseline 11/19    Nutrition  Oropharyngeal dysphagia  Discussed with patient's brother Todd on 11/13 and he would like to start tube feeds if need to.  - seen by SLP, rec nectar thick liquids by spoon    Physical deconditioning from ongoing medical illness, obesity.  Patient resident of nursing facility.  - OT has seen, no intervention needed    Acute anemia.  Unclear baseline.  Hemoglobin at outside ED 11.5, in hospital 9-11  - stable, monitoring    Hypothyroidism  Continue PTA levothyroxine   - TSH within normal limits.    Depression  Continue PTA Paxil    Chronic venous insufficiency  ACE wraps prn and limb elevation     Obesity with a BMI of 29.86.  Will need lifestyle modification as outpatient as able    Chronic constipation.  As needed bowel regimen.         DVT Prophylaxis:  eliquis  Poon Catheter: not present  Code Status:   discussed with patient, she deferred to her brother.  Discussed with Patrice (her brother) and considering her Severe baseline COPD, lives in care center chronically, does not participate in therapy -- he is agreeable with DNR/DNI.       Gato Araujo MD  Pager: [phone]  Cell Phone:  [phone]     (35 min  "total)          Interval History:      No complaints, other than wants mashed potatoes and gravy at 2 PM -- just had then at noon.           Physical Exam:        Physical Exam   Temp:  [97.3  F (36.3  C)-98.8  F (37.1  C)] 97.3  F (36.3  C)  Pulse:  [64-75] 67  Resp:  [18-29] 28  BP: (118-157)/(60-93) 128/68  FiO2 (%):  [60 %-75 %] 60 %  SpO2:  [93 %-100 %] 96 %    Intake/Output Summary (Last 24 hours) at 11/12/2020 1257  Last data filed at 11/12/2020 0400  Gross per 24 hour   Intake 600 ml   Output --   Net 600 ml       Admission Weight: 83.9 kg (185 lb)  Current Weight: 83.9 kg (185 lb)    PHYSICAL EXAM  GENERAL up and awake  HEART: reg S1S2  LUNGS: decreased breath sounds bilaterally, breathing comfortably  NEURO: alert, Ox2, (date Nov, 2020;  Place \"Whitney?\"), no focal deficits.   EXTREMITIES: no presacral or extremity edema  SKIN: Warm, dry.         Medications:          apixaban ANTICOAGULANT  5 mg Oral BID     aspirin  81 mg Oral Daily     atorvastatin  40 mg Oral At Bedtime     dexamethasone  6 mg Oral Q12H LUCY     fluticasone-vilanterol  1 puff Inhalation Daily     hypromellose-dextran  1 drop Left Eye 4x Daily     insulin aspart  1-6 Units Subcutaneous Q4H     iopamidol  78 mL Intravenous Once     levothyroxine  50 mcg Oral Daily     metoprolol tartrate  12.5 mg Oral BID     midazolam  0.5 mg Intravenous Once     pantoprazole  40 mg Oral QAM AC     PARoxetine  10 mg Oral QAM     piperacillin-tazobactam  3.375 g Intravenous Q6H     polyethylene glycol  17 g Oral Daily     sodium chloride 0.9 %  100 mL Intravenous Once     sennosides  1 tablet Oral BID     sodium chloride  2 spray Both Nostrils TID     sodium chloride (PF)  10 mL Intravenous Once     sodium chloride (PF)  3 mL Intracatheter Q8H     umeclidinium  1 puff Inhalation Daily     acetaminophen, acetaminophen, albuterol, artificial tears ophthalmic solution, glucose **OR** dextrose **OR** glucagon, hydrALAZINE, ipratropium-albuterol, labetalol, " lidocaine 4%, lidocaine (buffered or not buffered), - MEDICATION INSTRUCTIONS -, metoprolol, metoprolol, metoprolol, nitroGLYcerin, - MEDICATION INSTRUCTIONS -, ondansetron **OR** ondansetron, - MEDICATION INSTRUCTIONS -, polyethylene glycol, sodium chloride (PF)         Data:      All new lab and imaging data was reviewed.

## 2020-11-21 NOTE — PLAN OF CARE
VSS, LS coarse.  Congested cough, nonproductive.  Alert, oriented to self only.  Becomes agitated and angry with cares and repositioning but calms down quickly when activity is through.  Q2h turns, total feed, good appetite.  Incont of urine x2 overnight.  Denies pain.

## 2020-11-21 NOTE — PLAN OF CARE
Remains on hi flow nasal cannula 75% 35 SATs in 95% denies pain. Refuses to get up to chair. Good appetite needs to be fed. Turned q 2 hours mepelix intact to coccyx. Incontinent of urine and stool. Denies SOB lung sounds diminished congested cough nonproductive. Mitt to L hand with out it she reach's for her nasal cannula

## 2020-11-22 LAB
ANION GAP SERPL CALCULATED.3IONS-SCNC: 4 MMOL/L (ref 3–14)
BUN SERPL-MCNC: 20 MG/DL (ref 7–30)
CALCIUM SERPL-MCNC: 8.6 MG/DL (ref 8.5–10.1)
CHLORIDE SERPL-SCNC: 101 MMOL/L (ref 94–109)
CO2 SERPL-SCNC: 31 MMOL/L (ref 20–32)
CREAT SERPL-MCNC: 0.56 MG/DL (ref 0.52–1.04)
ERYTHROCYTE [DISTWIDTH] IN BLOOD BY AUTOMATED COUNT: 13.9 % (ref 10–15)
GFR SERPL CREATININE-BSD FRML MDRD: 89 ML/MIN/{1.73_M2}
GLUCOSE BLDC GLUCOMTR-MCNC: 111 MG/DL (ref 70–99)
GLUCOSE BLDC GLUCOMTR-MCNC: 125 MG/DL (ref 70–99)
GLUCOSE BLDC GLUCOMTR-MCNC: 141 MG/DL (ref 70–99)
GLUCOSE BLDC GLUCOMTR-MCNC: 179 MG/DL (ref 70–99)
GLUCOSE BLDC GLUCOMTR-MCNC: 205 MG/DL (ref 70–99)
GLUCOSE SERPL-MCNC: 97 MG/DL (ref 70–99)
HCT VFR BLD AUTO: 35.1 % (ref 35–47)
HGB BLD-MCNC: 11 G/DL (ref 11.7–15.7)
MCH RBC QN AUTO: 30.1 PG (ref 26.5–33)
MCHC RBC AUTO-ENTMCNC: 31.3 G/DL (ref 31.5–36.5)
MCV RBC AUTO: 96 FL (ref 78–100)
PLATELET # BLD AUTO: 322 10E9/L (ref 150–450)
POTASSIUM SERPL-SCNC: 4.3 MMOL/L (ref 3.4–5.3)
RBC # BLD AUTO: 3.66 10E12/L (ref 3.8–5.2)
SODIUM SERPL-SCNC: 136 MMOL/L (ref 133–144)
WBC # BLD AUTO: 13.8 10E9/L (ref 4–11)

## 2020-11-22 PROCEDURE — 999N000215 HC STATISTIC HFNC ADULT NON-CPAP

## 2020-11-22 PROCEDURE — 250N000011 HC RX IP 250 OP 636: Performed by: INTERNAL MEDICINE

## 2020-11-22 PROCEDURE — 85027 COMPLETE CBC AUTOMATED: CPT | Performed by: INTERNAL MEDICINE

## 2020-11-22 PROCEDURE — 999N000157 HC STATISTIC RCP TIME EA 10 MIN

## 2020-11-22 PROCEDURE — 120N000013 HC R&B IMCU

## 2020-11-22 PROCEDURE — 250N000013 HC RX MED GY IP 250 OP 250 PS 637: Performed by: HOSPITALIST

## 2020-11-22 PROCEDURE — 250N000013 HC RX MED GY IP 250 OP 250 PS 637: Performed by: INTERNAL MEDICINE

## 2020-11-22 PROCEDURE — 80048 BASIC METABOLIC PNL TOTAL CA: CPT | Performed by: INTERNAL MEDICINE

## 2020-11-22 PROCEDURE — 120N000001 HC R&B MED SURG/OB

## 2020-11-22 PROCEDURE — 999N001017 HC STATISTIC GLUCOSE BY METER IP

## 2020-11-22 PROCEDURE — 36415 COLL VENOUS BLD VENIPUNCTURE: CPT | Performed by: INTERNAL MEDICINE

## 2020-11-22 PROCEDURE — 250N000012 HC RX MED GY IP 250 OP 636 PS 637: Performed by: INTERNAL MEDICINE

## 2020-11-22 PROCEDURE — 99232 SBSQ HOSP IP/OBS MODERATE 35: CPT | Performed by: INTERNAL MEDICINE

## 2020-11-22 RX ADMIN — APIXABAN 5 MG: 5 TABLET, FILM COATED ORAL at 20:49

## 2020-11-22 RX ADMIN — CARBOXYMETHYLCELLULOSE SODIUM 1 DROP: 5 SOLUTION/ DROPS OPHTHALMIC at 09:47

## 2020-11-22 RX ADMIN — APIXABAN 5 MG: 5 TABLET, FILM COATED ORAL at 09:47

## 2020-11-22 RX ADMIN — DEXAMETHASONE 6 MG: 2 TABLET ORAL at 09:46

## 2020-11-22 RX ADMIN — INSULIN ASPART 1 UNITS: 100 INJECTION, SOLUTION INTRAVENOUS; SUBCUTANEOUS at 16:46

## 2020-11-22 RX ADMIN — METOPROLOL TARTRATE 12.5 MG: 25 TABLET, FILM COATED ORAL at 20:50

## 2020-11-22 RX ADMIN — INSULIN ASPART 2 UNITS: 100 INJECTION, SOLUTION INTRAVENOUS; SUBCUTANEOUS at 20:50

## 2020-11-22 RX ADMIN — PIPERACILLIN AND TAZOBACTAM 3.38 G: 3; .375 INJECTION, POWDER, LYOPHILIZED, FOR SOLUTION INTRAVENOUS at 09:47

## 2020-11-22 RX ADMIN — PIPERACILLIN AND TAZOBACTAM 3.38 G: 3; .375 INJECTION, POWDER, LYOPHILIZED, FOR SOLUTION INTRAVENOUS at 03:14

## 2020-11-22 RX ADMIN — ATORVASTATIN CALCIUM 40 MG: 40 TABLET, FILM COATED ORAL at 20:50

## 2020-11-22 RX ADMIN — METOPROLOL TARTRATE 12.5 MG: 25 TABLET, FILM COATED ORAL at 09:46

## 2020-11-22 RX ADMIN — UMECLIDINIUM 1 PUFF: 62.5 AEROSOL, POWDER ORAL at 09:47

## 2020-11-22 RX ADMIN — Medication 2 SPRAY: at 09:48

## 2020-11-22 RX ADMIN — PANTOPRAZOLE SODIUM 40 MG: 40 TABLET, DELAYED RELEASE ORAL at 09:46

## 2020-11-22 RX ADMIN — ASPIRIN 81 MG: 81 TABLET, CHEWABLE ORAL at 09:47

## 2020-11-22 RX ADMIN — Medication 2 SPRAY: at 16:46

## 2020-11-22 RX ADMIN — INSULIN ASPART 1 UNITS: 100 INJECTION, SOLUTION INTRAVENOUS; SUBCUTANEOUS at 01:01

## 2020-11-22 RX ADMIN — LEVOTHYROXINE SODIUM 50 MCG: 50 TABLET ORAL at 09:47

## 2020-11-22 RX ADMIN — FLUTICASONE FUROATE AND VILANTEROL TRIFENATATE 1 PUFF: 200; 25 POWDER RESPIRATORY (INHALATION) at 09:47

## 2020-11-22 RX ADMIN — PAROXETINE 10 MG: 10 TABLET, FILM COATED ORAL at 09:46

## 2020-11-22 ASSESSMENT — ACTIVITIES OF DAILY LIVING (ADL)
ADLS_ACUITY_SCORE: 26
ADLS_ACUITY_SCORE: 22
ADLS_ACUITY_SCORE: 26
ADLS_ACUITY_SCORE: 26

## 2020-11-22 NOTE — PROGRESS NOTES
RT Note:    Patient remains on HFNC throughout day. Slowly weaning FIO2 to achieve SPO2 88-92% per MD. HFNC 45 lpm 45%. BS diminished. RT will continue to follow and wean from HFNC as able.

## 2020-11-22 NOTE — PROGRESS NOTES
[facility]  Hospitalist Progress Note   11/22/2020          Assessment and Plan:       79 year old female from NH with medical history significant for COPD/emphysema with chronic hypoxemic respiratory failure on 4 LPM NC O2, depression, chronic venous insufficiency, paroxysmal A. fib, hypothyroidism, oropharyngeal dysphagia admitted on 11/11/2020 from outside hospital due to lack of beds for further management.    COVID 19 pneumonia -- positive 11/8/20   -- will stop Zosyn     Acute on Chronic respiratory failure   -- wean O2 as able to keep sat > 88%    COPD/emphysema w Chronic resp failure on 4 LPM NC O2    Possible obstructive sleep apnea.  - Continue HFNC, wean off as tolerated, sats good 11/19 but still on 75% 35 LPM  - dexamethasone 6 mg po daily  - s/p remdesivir course, completed 11/15  - pantoprazole 40 mg daily for ppx while on steroids  - ID and Pulmonary following, appreciate assistance   - continue Breo ellipta, incruse ellipta, prn combivent prn    NSTEMI, likely type II demand ischemia.  Acute HFrEF (unknown chronicity)  Troponin at outside ED slightly elevated per report. POC cardiac ultrasound showed no pericardial effusion.   *echo (repeat) 11/17 with EF 45-50, could not assess RWMA. RV fn appeared mild-mod reduced (previous echo 30-35% but unclear and very poor quality)  - Troponin 0.036 initial, peaked to 2.0 and trended down to 0.066.  - telemetry  - s/p lasix iv, see MAR, last received on 11/16.   - discontinue heparin (per cards recs), change to eliquis 5 mg BID for afib (below)  - repeat echo 11/17 as above, EF 45-50%, unable to assess WMA  - BNP 1301 (normal) 11/17  - continue ASA 81 mg daily, atorvastatin 40 mg at HS, metoprolol 12.5 mg BID    Paroxysmal A. Fib with intermittent RVR.  Hypertension  HLD  Has been having intermittent episodes of A. fib with RVR, RRT's and receiving IV metoprolol.  On and off sinus rhythm. TSH within normal limits.   - metoprolol to 12.5 mg  BID  - eliquis 5 mg BID  - echo repeated 11/17 as above    Delirium from medical illness, hospitalization.  Mild dementia baseline   Patient resident of [facility], per discussion with brother Todd he last met her few years back.  He is unclear about her baseline cognitive status. Intermittent agitation during hospital stay, now improved  - states she lives by self in apartment with help. Ambulates well, occasionally uses walker (none of this is correct)  - OT has seen, no needs given largely total cares at home  - suspect she is currently at her baseline 11/19    Nutrition  Oropharyngeal dysphagia  Discussed with patient's brother Todd on 11/13 and he would like to start tube feeds if need to.  - seen by SLP, rec nectar thick liquids by spoon    Physical deconditioning from ongoing medical illness, obesity.  Patient resident of nursing facility.  - OT has seen, no intervention needed    Acute anemia.  Unclear baseline.  Hemoglobin at outside ED 11.5, in hospital 9-11  - stable, monitoring    Hypothyroidism  Continue PTA levothyroxine   - TSH within normal limits.    Depression  Continue PTA Paxil    Chronic venous insufficiency  ACE wraps prn and limb elevation     Obesity with a BMI of 29.86.  Will need lifestyle modification as outpatient as able    Chronic constipation.  As needed bowel regimen.    Plan -- discussed weaning O2 with Resp Therapay    DVT Prophylaxis:  eliquis  Poon Catheter: not present  Code Status: DNR/DNI.      Disposition -- return to NH when able to wean O2 (?several days)     Gato Araujo MD  Pager: [phone]  Cell Phone:  [phone]           Interval History:      No complaints.          Physical Exam:        Physical Exam   Temp:  [97.8  F (36.6  C)-98.6  F (37  C)] 97.9  F (36.6  C)  Pulse:  [58-68] 58  Resp:  [16-18] 18  BP: (118-128)/(57-70) 118/65  FiO2 (%):  [40 %-55 %] 45 %  SpO2:  [85 %-96 %] 94 %    Intake/Output Summary (Last 24 hours) at 11/12/2020  "1257  Last data filed at 11/12/2020 0400  Gross per 24 hour   Intake 600 ml   Output --   Net 600 ml       Admission Weight: 83.9 kg (185 lb)  Current Weight: 83.9 kg (185 lb)    PHYSICAL EXAM  GENERAL up and awake  HEART: reg S1S2  LUNGS: decreased breath sounds bilaterally, breathing comfortably  NEURO: alert, Ox2, (date Nov, 2020;  Place \"[facility]?\"), no focal deficits.   EXTREMITIES: no presacral or extremity edema  SKIN: Warm, dry.         Medications:          apixaban ANTICOAGULANT  5 mg Oral BID     aspirin  81 mg Oral Daily     atorvastatin  40 mg Oral At Bedtime     artificial tears ophthalmic solution  1 drop Both Eyes TID     dexamethasone  6 mg Oral Daily     fluticasone-vilanterol  1 puff Inhalation Daily     insulin aspart  1-6 Units Subcutaneous Q4H     iopamidol  78 mL Intravenous Once     levothyroxine  50 mcg Oral Daily     metoprolol tartrate  12.5 mg Oral BID     pantoprazole  40 mg Oral QAM AC     PARoxetine  10 mg Oral QAM     piperacillin-tazobactam  3.375 g Intravenous Q6H     polyethylene glycol  17 g Oral Daily     sodium chloride 0.9 %  100 mL Intravenous Once     sennosides  1 tablet Oral BID     sodium chloride  2 spray Both Nostrils TID     sodium chloride (PF)  10 mL Intravenous Once     sodium chloride (PF)  3 mL Intracatheter Q8H     umeclidinium  1 puff Inhalation Daily     acetaminophen, acetaminophen, albuterol, artificial tears ophthalmic solution, glucose **OR** dextrose **OR** glucagon, ipratropium-albuterol, - MEDICATION INSTRUCTIONS -, - MEDICATION INSTRUCTIONS -, ondansetron **OR** ondansetron, - MEDICATION INSTRUCTIONS -, polyethylene glycol         Data:      All new lab and imaging data was reviewed.               "

## 2020-11-22 NOTE — PLAN OF CARE
A&O x2-3. VSS on HFNC. Up with A2/lift. Tolerating DD1 diet with nectar thickened liquids. IV SL w/intermittent abx.  Patient denies pain. Plan to wean oxygen as able and stop IV abx if WBC is improved. Continue to monitor.

## 2020-11-22 NOTE — PLAN OF CARE
Care Plan Nursing Note    Patient Information  Name: Carolina Frances  Age: 79 year old  Reason for admission: COVID +    Patient Assessment   DATE & TIME: 11/21/20, [id number]   Cognitive Concerns/ Orientation : Disoriented to time  BEHAVIOR & AGGRESSION TOOL COLOR: Green  ABNL VS/O2: high flow oxygen 50 liters and 45 % FIO2 , at first assessment FIO2 was 50 %  then decreased to 45 % oxygen sat range from 91-96 %  MOBILITY: assist of two and use of lift. Needs to be change and repositions  PAIN MANAGMENT: Denied  DIET: DD1 puree and necator thick   BOWEL/BLADDER: external cathete. No stool this shift  ABNL LAB/BG: BGM =188/107  DRAIN/DEVICES: oxygen tubing, urinary drainage tubing for external cath. PIV SL  SKIN: Nonblanchable redness on her coccyx area  TESTS/PROCEDURES: N/A  D/C DAY/GOALS/PLACE: Pending  OTHER IMPORTANT INFO:  ISO for COVID. Continue to monitor

## 2020-11-22 NOTE — PLAN OF CARE
Alert. Disoriented to situation. Forgetful. Agitated at times. VSS on HFNC. Up with lift, bed assist with 2. Refused repositioning and full skin assessment despite much encouragement and education from staff. DD1 diet with nectar thickened liquids; total feed assistance required. BG check every 4 hours.  IVs SL. Denies pain. Incontinent; purewick in place. Right arm contracted, brace in place. Denies SOB. Discharge back to NH pending improvement in respiratory status. Continue to monitor.

## 2020-11-23 ENCOUNTER — APPOINTMENT (OUTPATIENT)
Dept: GENERAL RADIOLOGY | Facility: CLINIC | Age: 80
DRG: 177 | End: 2020-11-23
Attending: INTERNAL MEDICINE
Payer: COMMERCIAL

## 2020-11-23 ENCOUNTER — APPOINTMENT (OUTPATIENT)
Dept: SPEECH THERAPY | Facility: CLINIC | Age: 80
DRG: 177 | End: 2020-11-23
Attending: INTERNAL MEDICINE
Payer: COMMERCIAL

## 2020-11-23 LAB
GLUCOSE BLDC GLUCOMTR-MCNC: 115 MG/DL (ref 70–99)
GLUCOSE BLDC GLUCOMTR-MCNC: 143 MG/DL (ref 70–99)
GLUCOSE BLDC GLUCOMTR-MCNC: 97 MG/DL (ref 70–99)

## 2020-11-23 PROCEDURE — 92526 ORAL FUNCTION THERAPY: CPT | Mod: GN | Performed by: SPEECH-LANGUAGE PATHOLOGIST

## 2020-11-23 PROCEDURE — 250N000013 HC RX MED GY IP 250 OP 250 PS 637: Performed by: INTERNAL MEDICINE

## 2020-11-23 PROCEDURE — 71045 X-RAY EXAM CHEST 1 VIEW: CPT

## 2020-11-23 PROCEDURE — 999N000157 HC STATISTIC RCP TIME EA 10 MIN

## 2020-11-23 PROCEDURE — 99232 SBSQ HOSP IP/OBS MODERATE 35: CPT | Performed by: INTERNAL MEDICINE

## 2020-11-23 PROCEDURE — 999N001017 HC STATISTIC GLUCOSE BY METER IP

## 2020-11-23 PROCEDURE — 250N000012 HC RX MED GY IP 250 OP 636 PS 637: Performed by: INTERNAL MEDICINE

## 2020-11-23 PROCEDURE — 250N000013 HC RX MED GY IP 250 OP 250 PS 637: Performed by: HOSPITALIST

## 2020-11-23 PROCEDURE — 120N000001 HC R&B MED SURG/OB

## 2020-11-23 PROCEDURE — 999N000215 HC STATISTIC HFNC ADULT NON-CPAP

## 2020-11-23 RX ORDER — DEXAMETHASONE 4 MG/1
4 TABLET ORAL DAILY
Status: DISCONTINUED | OUTPATIENT
Start: 2020-11-23 | End: 2020-11-24

## 2020-11-23 RX ADMIN — ATORVASTATIN CALCIUM 40 MG: 40 TABLET, FILM COATED ORAL at 22:39

## 2020-11-23 RX ADMIN — METOPROLOL TARTRATE 12.5 MG: 25 TABLET, FILM COATED ORAL at 10:52

## 2020-11-23 RX ADMIN — PANTOPRAZOLE SODIUM 40 MG: 40 TABLET, DELAYED RELEASE ORAL at 10:52

## 2020-11-23 RX ADMIN — PAROXETINE 10 MG: 10 TABLET, FILM COATED ORAL at 10:52

## 2020-11-23 RX ADMIN — CARBOXYMETHYLCELLULOSE SODIUM 1 DROP: 5 SOLUTION/ DROPS OPHTHALMIC at 15:49

## 2020-11-23 RX ADMIN — FLUTICASONE FUROATE AND VILANTEROL TRIFENATATE 1 PUFF: 200; 25 POWDER RESPIRATORY (INHALATION) at 10:59

## 2020-11-23 RX ADMIN — ASPIRIN 81 MG: 81 TABLET, CHEWABLE ORAL at 10:52

## 2020-11-23 RX ADMIN — METOPROLOL TARTRATE 12.5 MG: 25 TABLET, FILM COATED ORAL at 20:16

## 2020-11-23 RX ADMIN — APIXABAN 5 MG: 5 TABLET, FILM COATED ORAL at 20:16

## 2020-11-23 RX ADMIN — APIXABAN 5 MG: 5 TABLET, FILM COATED ORAL at 10:53

## 2020-11-23 RX ADMIN — DEXAMETHASONE 4 MG: 4 TABLET ORAL at 10:52

## 2020-11-23 RX ADMIN — CARBOXYMETHYLCELLULOSE SODIUM 1 DROP: 5 SOLUTION/ DROPS OPHTHALMIC at 10:53

## 2020-11-23 RX ADMIN — LEVOTHYROXINE SODIUM 50 MCG: 50 TABLET ORAL at 10:53

## 2020-11-23 RX ADMIN — UMECLIDINIUM 1 PUFF: 62.5 AEROSOL, POWDER ORAL at 11:02

## 2020-11-23 ASSESSMENT — MIFFLIN-ST. JEOR: SCORE: 1318.75

## 2020-11-23 ASSESSMENT — ACTIVITIES OF DAILY LIVING (ADL)
ADLS_ACUITY_SCORE: 22
ADLS_ACUITY_SCORE: 26

## 2020-11-23 NOTE — PROGRESS NOTES
[facility]  Infectious Disease Progress Note          Assessment and Plan:   IMPRESSION:   1.  A 79-year-old female with acute COVID-19 pneumonia, progressive respiratory insufficiency, no clear secondary or other infection.   2.  Respiratory failure due to COVID-19 despite interventions.   3.  Obesity.   4.  Chronic venous stasis disease without obvious cellulitis.   5.  Chronic obstructive lung disease.      RECOMMENDATIONS:   1.  Completed remdesivir  5 days, steroids, anticoagulation and nonspecific respiratory and other treatments.   2.  Convalescent plasma both not available and out of favor at present, as multiple progressive studies and data suggest no benefits and currently no supply anyway, not indicated at this point.  No indication for intervening with tocilizumab based on lab profile.   3.  Still doubt bacterial some zosyn, now off  Conceivably extend remdesivir although labs etc suggest maybe mostly at lung damage rather than active viral issue, discontinue remdesivir, further the case CRP now near nl    still sleepy, ill, high flow O2 still needed  CT chest pending as not able to go for it based on hypoxia          Interval History:   no new complaints confused, no fever no + cxs,  LFTs and creat OK  Needing high flow O2 still   crp all the way down to 6,  WBC 13(steroids)              Medications:       apixaban ANTICOAGULANT  5 mg Oral BID     aspirin  81 mg Oral Daily     atorvastatin  40 mg Oral At Bedtime     artificial tears ophthalmic solution  1 drop Both Eyes TID     dexamethasone  4 mg Oral Daily     fluticasone-vilanterol  1 puff Inhalation Daily     iopamidol  78 mL Intravenous Once     levothyroxine  50 mcg Oral Daily     metoprolol tartrate  12.5 mg Oral BID     pantoprazole  40 mg Oral QAM AC     PARoxetine  10 mg Oral QAM     polyethylene glycol  17 g Oral Daily     sodium chloride 0.9 %  100 mL Intravenous Once     sennosides  1 tablet Oral BID     sodium  "chloride  2 spray Both Nostrils TID     sodium chloride (PF)  10 mL Intravenous Once     sodium chloride (PF)  3 mL Intracatheter Q8H     umeclidinium  1 puff Inhalation Daily                  Physical Exam:   Blood pressure (!) 152/85, pulse 60, temperature 97.4  F (36.3  C), temperature source Axillary, resp. rate 16, height 1.676 m (5' 6\"), weight 81.7 kg (180 lb 1.9 oz), SpO2 95 %.  Wt Readings from Last 2 Encounters:   11/23/20 81.7 kg (180 lb 1.9 oz)     Vital Signs with Ranges  Temp:  [97.4  F (36.3  C)-98.5  F (36.9  C)] 97.4  F (36.3  C)  Pulse:  [58-77] 60  Resp:  [16-20] 16  BP: (118-152)/(58-85) 152/85  FiO2 (%):  [45 %-55 %] 55 %  SpO2:  [84 %-96 %] 95 %    Constitutional: Awake, alert, confusede, no apparent distress   Lungs: Congestion to auscultation bilaterally, no crackles or wheezing   Cardiovascular: Regular rate and rhythm, normal S1 and S2, and no murmur noted   Abdomen: Normal bowel sounds, soft, non-distended, non-tender   Skin: No rashes, no cyanosis, no edema   Other:           Data:   All microbiology laboratory data reviewed.  Recent Labs   Lab Test 11/22/20  0940 11/21/20  0647 11/20/20  0753   WBC 13.8* 11.6* 14.0*   HGB 11.0* 10.3* 10.8*   HCT 35.1 33.1* 34.9*   MCV 96 97 98    311 334     Recent Labs   Lab Test 11/22/20  0940 11/20/20  0753 11/19/20  0711   CR 0.56 0.54 0.47*     Recent Labs   Lab Test 11/18/20  0651   SED 33*     No lab results found.    Invalid input(s): JED    "

## 2020-11-23 NOTE — PROGRESS NOTES
SPIRITUAL HEALTH SERVICES: Tele-Encounter  Patient Location: [address]  Spoke with: Left message with pt's brother    I reached out to pt's brother due to pt's length of stay. He was not available so I left a message introducing myself and  services and let him know we are here to offer support to family. I left  number for him to call back to with any questions, concerns, or SH needs.    PLAN:   services remains available.      Maida Murillo  [place] Resident      ______________________________    Type of service:  Telephone Visit     has received verbal consent for a TelephoneVisit from the patient? Yes    Distance Provider Location: designated [place] office or home office (secure setting)    Mode of Communication: telephone (via [employer] phone or [employer] tele-call-number ([phone]))

## 2020-11-23 NOTE — PROGRESS NOTES
[facility]  Hospitalist Progress Note   11/23/2020          Assessment and Plan:       79 year old female from NH with medical history significant for COPD/emphysema with chronic hypoxemic respiratory failure on 4 LPM NC O2, depression, chronic venous insufficiency, paroxysmal A. fib, hypothyroidism, oropharyngeal dysphagia admitted on 11/11/2020 from outside hospital due to lack of beds for further management.    COVID 19 pneumonia -- positive 11/8/20   --  Zosyn stopped, continue supportive care    Acute on Chronic respiratory failure   -- wean O2 as able to keep sat > 88%    COPD/emphysema w Chronic resp failure on 4 LPM NC O2    Possible obstructive sleep apnea.  - Continue HFNC, wean off as tolerated, sats good 11/19 but still on 75% 35 LPM  - dexamethasone 6 mg po daily  - s/p remdesivir course, completed 11/15  - pantoprazole 40 mg daily for ppx while on steroids  - ID and Pulmonary following, appreciate assistance   - continue Breo ellipta, incruse ellipta, prn combivent prn    NSTEMI, likely type II demand ischemia.  Acute HFrEF (unknown chronicity)  Troponin at outside ED slightly elevated per report. POC cardiac ultrasound showed no pericardial effusion.   *echo (repeat) 11/17 with EF 45-50, could not assess RWMA. RV fn appeared mild-mod reduced (previous echo 30-35% but unclear and very poor quality)  - Troponin 0.036 initial, peaked to 2.0 and trended down to 0.066.  - telemetry  - s/p lasix iv, see MAR, last received on 11/16.   - discontinue heparin (per cards recs), change to eliquis 5 mg BID for afib (below)  - repeat echo 11/17 as above, EF 45-50%, unable to assess WMA  - BNP 1301 (normal) 11/17  - continue ASA 81 mg daily, atorvastatin 40 mg at HS, metoprolol 12.5 mg BID    Paroxysmal A. Fib with intermittent RVR.  Hypertension  HLD  Has been having intermittent episodes of A. fib with RVR, RRT's and receiving IV metoprolol.  On and off sinus rhythm. TSH within normal limits.   -  metoprolol to 12.5 mg BID  - eliquis 5 mg BID  - echo repeated 11/17 as above    Delirium from medical illness, hospitalization.  Mild dementia baseline   Patient resident of [facility], per discussion with brother Todd he last met her few years back.  He is unclear about her baseline cognitive status. Intermittent agitation during hospital stay, now improved  - states she lives by self in apartment with help. Ambulates well, occasionally uses walker (none of this is correct)  - OT has seen, no needs given largely total cares at home  - suspect she is currently at her baseline 11/19    Nutrition  Oropharyngeal dysphagia  Discussed with patient's brother Todd on 11/13 and he would like to start tube feeds if need to.  - seen by SLP, rec nectar thick liquids by spoon    Physical deconditioning from ongoing medical illness, obesity.  Patient resident of nursing facility.  - OT has seen, no intervention needed    Acute anemia.  Unclear baseline.  Hemoglobin at outside ED 11.5, in hospital 9-11  - stable, monitoring    Hypothyroidism  Continue PTA levothyroxine   - TSH within normal limits.    Depression  Continue PTA Paxil    Chronic venous insufficiency  ACE wraps prn and limb elevation     Obesity with a BMI of 29.86.  Will need lifestyle modification as outpatient as able    Chronic constipation.  As needed bowel regimen.    Plan -- discussed weaning O2 with Resp Therapay    DVT Prophylaxis:  eliquis  Poon Catheter: not present  Code Status: DNR/DNI.      Disposition -- return to NH when able to wean O2 (?several days)     Gato Araujo MD  Pager: [phone]  Cell Phone:  [phone]           Interval History:      No complaints.          Physical Exam:        Physical Exam   Temp:  [97.4  F (36.3  C)-98.5  F (36.9  C)] 97.4  F (36.3  C)  Pulse:  [60-77] 62  Resp:  [16-20] 16  BP: (122-152)/(58-85) 126/62  FiO2 (%):  [45 %-55 %] 45 %  SpO2:  [84 %-96 %] 92 %    Intake/Output Summary (Last 24 hours)  at 11/12/2020 1257  Last data filed at 11/12/2020 0400  Gross per 24 hour   Intake 600 ml   Output --   Net 600 ml       Admission Weight: 83.9 kg (185 lb)  Current Weight: 83.9 kg (185 lb)    PHYSICAL EXAM  GENERAL up and awake  HEART: reg S1S2  LUNGS: decreased breath sounds bilaterally, breathing comfortably  NEURO: alert, Ox2, no focal deficits.   EXTREMITIES: no presacral or extremity edema  SKIN: Warm, dry.         Medications:          apixaban ANTICOAGULANT  5 mg Oral BID     aspirin  81 mg Oral Daily     atorvastatin  40 mg Oral At Bedtime     artificial tears ophthalmic solution  1 drop Both Eyes TID     dexamethasone  4 mg Oral Daily     fluticasone-vilanterol  1 puff Inhalation Daily     iopamidol  78 mL Intravenous Once     levothyroxine  50 mcg Oral Daily     metoprolol tartrate  12.5 mg Oral BID     pantoprazole  40 mg Oral QAM AC     PARoxetine  10 mg Oral QAM     polyethylene glycol  17 g Oral Daily     sodium chloride 0.9 %  100 mL Intravenous Once     sennosides  1 tablet Oral BID     sodium chloride  2 spray Both Nostrils TID     sodium chloride (PF)  10 mL Intravenous Once     sodium chloride (PF)  3 mL Intracatheter Q8H     umeclidinium  1 puff Inhalation Daily     acetaminophen, acetaminophen, albuterol, artificial tears ophthalmic solution, ipratropium-albuterol, - MEDICATION INSTRUCTIONS -, - MEDICATION INSTRUCTIONS -, ondansetron **OR** ondansetron, - MEDICATION INSTRUCTIONS -, polyethylene glycol         Data:      All new lab and imaging data was reviewed.

## 2020-11-23 NOTE — PLAN OF CARE
Speech Language Therapy Discharge Summary    Reason for therapy discharge:    No further expectations of functional progress.    Progress towards therapy goal(s). See goals on Care Plan in Epic electronic health record for goal details.  Goals not met.  Barriers to achieving goals:   Continued HFNC needs above 25L.    Therapy recommendation(s):    Plan to discontinue current order as pt at 45L HFNC with inability to wean to 20-25L or less.  Please re-consult SLP if able to wean O2 below 20-25L.  Continue a current DDL1 and nectar thick with nursing assist/supervision, sit at 90 degrees, and pacing.

## 2020-11-23 NOTE — PLAN OF CARE
VSS, on HFNC, disoriented to situation, refusing cares/repositioning, purewick in place, [place], COVID +, DD1 nectar thick total feed, BGs 205, 143, tele NSR, continue to monitor.

## 2020-11-24 LAB — GLUCOSE BLDC GLUCOMTR-MCNC: 117 MG/DL (ref 70–99)

## 2020-11-24 PROCEDURE — 120N000001 HC R&B MED SURG/OB

## 2020-11-24 PROCEDURE — 250N000013 HC RX MED GY IP 250 OP 250 PS 637: Performed by: HOSPITALIST

## 2020-11-24 PROCEDURE — 999N001017 HC STATISTIC GLUCOSE BY METER IP

## 2020-11-24 PROCEDURE — 99232 SBSQ HOSP IP/OBS MODERATE 35: CPT | Performed by: INTERNAL MEDICINE

## 2020-11-24 PROCEDURE — 250N000012 HC RX MED GY IP 250 OP 636 PS 637: Performed by: INTERNAL MEDICINE

## 2020-11-24 PROCEDURE — 999N000157 HC STATISTIC RCP TIME EA 10 MIN

## 2020-11-24 PROCEDURE — 999N000215 HC STATISTIC HFNC ADULT NON-CPAP

## 2020-11-24 PROCEDURE — 250N000013 HC RX MED GY IP 250 OP 250 PS 637: Performed by: INTERNAL MEDICINE

## 2020-11-24 RX ORDER — FAMOTIDINE 20 MG/1
20 TABLET, FILM COATED ORAL 2 TIMES DAILY
Status: DISCONTINUED | OUTPATIENT
Start: 2020-11-25 | End: 2020-11-25 | Stop reason: HOSPADM

## 2020-11-24 RX ORDER — ALBUTEROL SULFATE 90 UG/1
2 AEROSOL, METERED RESPIRATORY (INHALATION) EVERY 4 HOURS PRN
Refills: 0
Start: 2020-11-24

## 2020-11-24 RX ORDER — METOPROLOL TARTRATE 25 MG/1
12.5 TABLET, FILM COATED ORAL 2 TIMES DAILY
Refills: 0
Start: 2020-11-24

## 2020-11-24 RX ORDER — DEXAMETHASONE 2 MG/1
2 TABLET ORAL DAILY
Status: DISCONTINUED | OUTPATIENT
Start: 2020-11-25 | End: 2020-11-25 | Stop reason: HOSPADM

## 2020-11-24 RX ADMIN — APIXABAN 5 MG: 5 TABLET, FILM COATED ORAL at 09:50

## 2020-11-24 RX ADMIN — ATORVASTATIN CALCIUM 40 MG: 40 TABLET, FILM COATED ORAL at 21:13

## 2020-11-24 RX ADMIN — LEVOTHYROXINE SODIUM 50 MCG: 50 TABLET ORAL at 09:50

## 2020-11-24 RX ADMIN — APIXABAN 5 MG: 5 TABLET, FILM COATED ORAL at 21:12

## 2020-11-24 RX ADMIN — CARBOXYMETHYLCELLULOSE SODIUM 1 DROP: 5 SOLUTION/ DROPS OPHTHALMIC at 09:51

## 2020-11-24 RX ADMIN — STANDARDIZED SENNA CONCENTRATE 1 TABLET: 8.6 TABLET ORAL at 09:50

## 2020-11-24 RX ADMIN — PANTOPRAZOLE SODIUM 40 MG: 40 TABLET, DELAYED RELEASE ORAL at 09:50

## 2020-11-24 RX ADMIN — CARBOXYMETHYLCELLULOSE SODIUM 1 DROP: 5 SOLUTION/ DROPS OPHTHALMIC at 16:24

## 2020-11-24 RX ADMIN — UMECLIDINIUM 1 PUFF: 62.5 AEROSOL, POWDER ORAL at 10:04

## 2020-11-24 RX ADMIN — DEXAMETHASONE 4 MG: 4 TABLET ORAL at 09:50

## 2020-11-24 RX ADMIN — METOPROLOL TARTRATE 12.5 MG: 25 TABLET, FILM COATED ORAL at 21:13

## 2020-11-24 RX ADMIN — CARBOXYMETHYLCELLULOSE SODIUM 1 DROP: 5 SOLUTION/ DROPS OPHTHALMIC at 21:12

## 2020-11-24 RX ADMIN — STANDARDIZED SENNA CONCENTRATE 1 TABLET: 8.6 TABLET ORAL at 21:12

## 2020-11-24 RX ADMIN — PAROXETINE 10 MG: 10 TABLET, FILM COATED ORAL at 09:50

## 2020-11-24 RX ADMIN — METOPROLOL TARTRATE 12.5 MG: 25 TABLET, FILM COATED ORAL at 09:50

## 2020-11-24 RX ADMIN — FLUTICASONE FUROATE AND VILANTEROL TRIFENATATE 1 PUFF: 200; 25 POWDER RESPIRATORY (INHALATION) at 09:57

## 2020-11-24 RX ADMIN — ASPIRIN 81 MG: 81 TABLET, CHEWABLE ORAL at 09:50

## 2020-11-24 ASSESSMENT — ACTIVITIES OF DAILY LIVING (ADL)
ADLS_ACUITY_SCORE: 26

## 2020-11-24 NOTE — PLAN OF CARE
[id number]:  Pt can be non-compliance to care at times. VSS on 45L of HFNC. Denies any pain. L arm contracted, with arm brace in place. External catheter in place. BG monitoring. Total care. Continue to monitor.

## 2020-11-24 NOTE — PROGRESS NOTES
"BRIEF NUTRITION RE-ASSESSMENT      REASON FOR RE-ASSESSMENT:  LOS Follow-Up    CURRENT DIET AND INTAKE:  Diet:  DD1, Nectar Thick + Thickened Boost (4oz) with meals               Noted patient is a total feed and appetite is good per RN documentation.  Based on flowsheets patient is consuming % of meals.   Breakfast this morning was Hebrew toast, Cream of Wheat, applesauce, milk, Boost, and OJ.  Dinner last night consisted of pureed beef, mac and cheese, and Boost.      She is disoriented and agitated + COVID-19 positive and therefore unable to visit in person.     ANTHROPOMETRICS:  Height: 5' 6\"  Weight:  81.7 kg (180#)(11/23)  Weight up from 11/16 weight     LABS:  Labs noted    MALNUTRITION:  Visual Nutrition Focused Physical Assessment (NFPA) not completed due to restrictions on face-to-face patient care during COVID-19 Pandemic. Do not suspect muscle/fat losses.  Patient does not meet two of the criteria necessary for diagnosing malnutrition.     NUTRITION INTERVENTION:  Nutrition Diagnosis:  No nutrition diagnosis at this time.    Implementation:  Nutrition Education:  Not appropriate at this time due to patient condition.  Continue 4oz Boost TID with meals.    FOLLOW UP/MONITORING:   Will re-evaluate in 7 - 10 days, or sooner, if re-consulted.    Terri Andrade RD, LD, CNSC   Clinical Dietitian - [facility]             "

## 2020-11-24 NOTE — PROGRESS NOTES
[facility]  Hospitalist Progress Note   11/24/2020          Assessment and Plan:       79 year old female from NH with medical history significant for COPD/emphysema with chronic hypoxemic respiratory failure on 4 LPM NC O2, depression, chronic venous insufficiency, paroxysmal A. fib, hypothyroidism, oropharyngeal dysphagia admitted on 11/11/2020 from outside hospital due to lack of beds for further management.    COVID 19 pneumonia -- positive 11/8/20   --  Zosyn stopped, continue supportive care    Acute on Chronic respiratory failure   -- wean O2 as able to keep sat > 88%    COPD/emphysema w Chronic resp failure on 4 LPM NC O2    Possible obstructive sleep apnea.  - Continue HFNC, wean off as tolerated, sats good 11/19 but still on 75% 35 LPM  - dexamethasone 6 mg po daily  - s/p remdesivir course, completed 11/15  - pantoprazole 40 mg daily for ppx while on steroids  - ID and Pulmonary following, appreciate assistance   - continue Breo ellipta, incruse ellipta, prn combivent prn    NSTEMI, likely type II demand ischemia.  Acute HFrEF (unknown chronicity)  Troponin at outside ED slightly elevated per report. POC cardiac ultrasound showed no pericardial effusion.   *echo (repeat) 11/17 with EF 45-50, could not assess RWMA. RV fn appeared mild-mod reduced (previous echo 30-35% but unclear and very poor quality)  - Troponin 0.036 initial, peaked to 2.0 and trended down to 0.066.  - telemetry  - s/p lasix iv, see MAR, last received on 11/16.   - discontinue heparin (per cards recs), change to eliquis 5 mg BID for afib (below)  - repeat echo 11/17 as above, EF 45-50%, unable to assess WMA  - BNP 1301 (normal) 11/17  - continue ASA 81 mg daily, atorvastatin 40 mg at HS, metoprolol 12.5 mg BID    Paroxysmal A. Fib with intermittent RVR.  Hypertension  HLD  Has been having intermittent episodes of A. fib with RVR, RRT's and receiving IV metoprolol.  On and off sinus rhythm. TSH within normal limits.   -  metoprolol to 12.5 mg BID  - eliquis 5 mg BID  - echo repeated 11/17 as above    Delirium from medical illness, hospitalization.  Mild dementia baseline   Patient resident of [facility], per discussion with brother Todd he last met her few years back.  He is unclear about her baseline cognitive status. Intermittent agitation during hospital stay, now improved  - states she lives by self in apartment with help. Ambulates well, occasionally uses walker (none of this is correct)  - OT has seen, no needs given largely total cares at home  - suspect she is currently at her baseline 11/19    Nutrition  Oropharyngeal dysphagia  Discussed with patient's brother Todd on 11/13 and he would like to start tube feeds if need to.  - seen by SLP, rec nectar thick liquids by spoon    Physical deconditioning from ongoing medical illness, obesity.  Patient resident of nursing facility.  - OT has seen, no intervention needed    Acute anemia.  Unclear baseline.  Hemoglobin at outside ED 11.5, in hospital 9-11  - stable, monitoring    Hypothyroidism  Continue PTA levothyroxine   - TSH within normal limits.    Depression  Continue PTA Paxil    Chronic venous insufficiency  ACE wraps prn and limb elevation     Obesity with a BMI of 29.86.  Will need lifestyle modification as outpatient as able    Chronic constipation.  As needed bowel regimen.    Plan -- discussed weaning O2 with Resp Therapy    DVT Prophylaxis:  eliquis  Poon Catheter: not present  Code Status: DNR/DNI.      Disposition -- return to NH tomorrow, discussed with brother.      Gato Araujo MD  Pager: [phone]  Cell Phone:  [phone]           Interval History:      No complaints.          Physical Exam:        Physical Exam   Temp:  [97.4  F (36.3  C)-98.3  F (36.8  C)] 97.9  F (36.6  C)  Pulse:  [57-69] 67  Resp:  [16] 16  BP: (128-148)/(60-74) 148/74  FiO2 (%):  [40 %-45 %] 40 %  SpO2:  [86 %-94 %] 92 %    Intake/Output Summary (Last 24 hours) at  11/12/2020 1257  Last data filed at 11/12/2020 0400  Gross per 24 hour   Intake 600 ml   Output --   Net 600 ml       Admission Weight: 83.9 kg (185 lb)  Current Weight: 83.9 kg (185 lb)    PHYSICAL EXAM  GENERAL up and awake  HEART: reg S1S2  LUNGS: decreased breath sounds bilaterally, breathing comfortably  NEURO: alert, Ox2, no focal deficits.   EXTREMITIES: no presacral or extremity edema  SKIN: Warm, dry.         Medications:          apixaban ANTICOAGULANT  5 mg Oral BID     aspirin  81 mg Oral Daily     atorvastatin  40 mg Oral At Bedtime     artificial tears ophthalmic solution  1 drop Both Eyes TID     [START ON 11/25/2020] dexamethasone  2 mg Oral Daily     fluticasone-vilanterol  1 puff Inhalation Daily     iopamidol  78 mL Intravenous Once     levothyroxine  50 mcg Oral Daily     metoprolol tartrate  12.5 mg Oral BID     pantoprazole  40 mg Oral QAM AC     PARoxetine  10 mg Oral QAM     polyethylene glycol  17 g Oral Daily     sodium chloride 0.9 %  100 mL Intravenous Once     sennosides  1 tablet Oral BID     sodium chloride  2 spray Both Nostrils TID     sodium chloride (PF)  10 mL Intravenous Once     sodium chloride (PF)  3 mL Intracatheter Q8H     umeclidinium  1 puff Inhalation Daily     acetaminophen, acetaminophen, albuterol, artificial tears ophthalmic solution, ipratropium-albuterol, - MEDICATION INSTRUCTIONS -, - MEDICATION INSTRUCTIONS -, ondansetron **OR** ondansetron, - MEDICATION INSTRUCTIONS -, polyethylene glycol         Data:      All new lab and imaging data was reviewed.

## 2020-11-24 NOTE — PLAN OF CARE
4740-4822: Alert to self and situation, forgetful. VSS on 45L high flow. Patient denies pain. Pulses palpable. LS diminished. BS+, BM-. Dysphagia diet, takes pills with applesauce. Denies N/V. Incontinent with pure wick in place. Assist x2 turn and repo, patient often refuses to be turned. Lots of education and reinforcement for the need of turning and repositioning.

## 2020-11-24 NOTE — PLAN OF CARE
A&O x 3, disoriented to situation. VSS on 45L HFNC. Assist of 2 with lift, refusing repositioning. DD1 pureed diet with nectar thick liquids, tolerating well. PIV saline locked. SEIGEL, but otherwise denies nausea or pain. Incontinent, external cath in place, no BM. Plan to continue to wean O2 as able to baseline 4L NC. Continue plan of care.

## 2020-11-24 NOTE — PROGRESS NOTES
[facility]  Infectious Disease Progress Note          Assessment and Plan:   IMPRESSION:   1.  A 79-year-old female with acute COVID-19 pneumonia, progressive respiratory insufficiency, no clear secondary or other infection.   2.  Respiratory failure due to COVID-19 despite interventions.   3.  Obesity.   4.  Chronic venous stasis disease without obvious cellulitis.   5.  Chronic obstructive lung disease.      RECOMMENDATIONS:   1.  Completed remdesivir  5 days, steroids, anticoagulation and nonspecific respiratory and other treatments.   2.  Convalescent plasma both not available and out of favor at present, as multiple progressive studies and data suggest no benefits and currently no supply anyway, not indicated at this point.  No indication for intervening with tocilizumab based on lab profile.   3.  Still doubt bacterial some zosyn, now off  Conceivably extend remdesivir although labs etc suggest maybe mostly at lung damage rather than active viral issue, discontinue remdesivir, further the case CRP now near nl    still sleepy, ill, high flow O2 still needed  CT chest pending as not able to go for it based on hypoxia  At lung damage phase, will follow peripherally          Interval History:   no new complaints confused, no fever no + cxs,  LFTs and creat OK  Needing high flow O2 still   crp all the way down to 6,  WBC 13(steroids)              Medications:       apixaban ANTICOAGULANT  5 mg Oral BID     aspirin  81 mg Oral Daily     atorvastatin  40 mg Oral At Bedtime     artificial tears ophthalmic solution  1 drop Both Eyes TID     dexamethasone  4 mg Oral Daily     fluticasone-vilanterol  1 puff Inhalation Daily     iopamidol  78 mL Intravenous Once     levothyroxine  50 mcg Oral Daily     metoprolol tartrate  12.5 mg Oral BID     pantoprazole  40 mg Oral QAM AC     PARoxetine  10 mg Oral QAM     polyethylene glycol  17 g Oral Daily     sodium chloride 0.9 %  100 mL Intravenous Once  "    sennosides  1 tablet Oral BID     sodium chloride  2 spray Both Nostrils TID     sodium chloride (PF)  10 mL Intravenous Once     sodium chloride (PF)  3 mL Intracatheter Q8H     umeclidinium  1 puff Inhalation Daily                  Physical Exam:   Blood pressure (!) 148/74, pulse 67, temperature 97.9  F (36.6  C), temperature source Oral, resp. rate 16, height 1.676 m (5' 6\"), weight 81.7 kg (180 lb 1.9 oz), SpO2 93 %.  Wt Readings from Last 2 Encounters:   11/23/20 81.7 kg (180 lb 1.9 oz)     Vital Signs with Ranges  Temp:  [97.4  F (36.3  C)-98.3  F (36.8  C)] 97.9  F (36.6  C)  Pulse:  [57-69] 67  Resp:  [16] 16  BP: (126-148)/(60-74) 148/74  FiO2 (%):  [40 %-55 %] 40 %  SpO2:  [90 %-95 %] 93 %    Constitutional: Awake, alert, confusede, no apparent distress   Lungs: Congestion to auscultation bilaterally, no crackles or wheezing   Cardiovascular: Regular rate and rhythm, normal S1 and S2, and no murmur noted   Abdomen: Normal bowel sounds, soft, non-distended, non-tender   Skin: No rashes, no cyanosis, no edema   Other:           Data:   All microbiology laboratory data reviewed.  Recent Labs   Lab Test 11/22/20  0940 11/21/20  0647 11/20/20  0753   WBC 13.8* 11.6* 14.0*   HGB 11.0* 10.3* 10.8*   HCT 35.1 33.1* 34.9*   MCV 96 97 98    311 334     Recent Labs   Lab Test 11/22/20  0940 11/20/20  0753 11/19/20  0711   CR 0.56 0.54 0.47*     Recent Labs   Lab Test 11/18/20  0651   SED 33*     No lab results found.    Invalid input(s): JED    "

## 2020-11-24 NOTE — PLAN OF CARE
A&O x 2, disoriented to situation location. VSS on 4L via oximyzer (is on 4L baseline). Assist of 2 with lift, refusing repositioning. DD1 pureed diet with nectar thick liquids, tolerating well, great appetite. PIV saline locked. SIEGEL, but otherwise denies nausea or pain. Incontinent, external cath in place, no BM. Plan to discharge tomorrow back to nursing facility. Continue plan of care.

## 2020-11-24 NOTE — CONSULTS
Care Management Follow Up    Length of Stay (days): 13    Expected Discharge Date: 11/26/20(return LTC)     Concerns to be Addressed:      discharge  back to LTC  Patient plan of care discussed at interdisciplinary rounds: Yes    Anticipated Discharge Disposition:   Back to LTC     Anticipated Discharge Services:  TBD  Anticipated Discharge DME:  TBD    Patient/Family in Agreement with the Plan:  Yes     Private pay costs discussed: Not applicable    Additional Information:  CECILIA spoke with Kristi in admissions at [facility] and informed her the plan is for pt to discharge back tomorrow. Kristi states that is okay and she would prefer for pt to discharge during the afternoon. CECILIA states they would call Kristi tomorrow to discuss more discharge planning. CECILIA consulted with bedside nurse and inquired how pt can transport. SW was informed stretcher due to pt's lack of trunk support and orientation. Pt also would need a stretcher because she is covid positive. CECILIA scheduled a ride for 1500 with [facility] transport. CECILIA  Completed PCS form and faxed it to [employer]. PCS form now on front of chart.       ZORAN Garrison

## 2020-11-24 NOTE — PROGRESS NOTES
Pt was weaned off of HFNC. Pt is currently on a 6L oxymizer cannula with SpO2 in the low 90's. No increased work of breathing at this time. HFNC on standby.  11/24/2020  Cathryn Carney RT

## 2020-11-25 VITALS
BODY MASS INDEX: 28.66 KG/M2 | TEMPERATURE: 97.9 F | OXYGEN SATURATION: 94 % | DIASTOLIC BLOOD PRESSURE: 67 MMHG | HEART RATE: 69 BPM | WEIGHT: 178.35 LBS | SYSTOLIC BLOOD PRESSURE: 122 MMHG | RESPIRATION RATE: 18 BRPM | HEIGHT: 66 IN

## 2020-11-25 PROCEDURE — 250N000013 HC RX MED GY IP 250 OP 250 PS 637: Performed by: HOSPITALIST

## 2020-11-25 PROCEDURE — 250N000013 HC RX MED GY IP 250 OP 250 PS 637: Performed by: INTERNAL MEDICINE

## 2020-11-25 PROCEDURE — 99239 HOSP IP/OBS DSCHRG MGMT >30: CPT | Performed by: INTERNAL MEDICINE

## 2020-11-25 PROCEDURE — 250N000012 HC RX MED GY IP 250 OP 636 PS 637: Performed by: INTERNAL MEDICINE

## 2020-11-25 RX ORDER — FAMOTIDINE 20 MG/1
20 TABLET, FILM COATED ORAL 2 TIMES DAILY
Refills: 0
Start: 2020-11-25

## 2020-11-25 RX ADMIN — POLYETHYLENE GLYCOL 3350 17 G: 17 POWDER, FOR SOLUTION ORAL at 09:32

## 2020-11-25 RX ADMIN — FLUTICASONE FUROATE AND VILANTEROL TRIFENATATE 1 PUFF: 200; 25 POWDER RESPIRATORY (INHALATION) at 09:33

## 2020-11-25 RX ADMIN — LEVOTHYROXINE SODIUM 50 MCG: 50 TABLET ORAL at 09:31

## 2020-11-25 RX ADMIN — UMECLIDINIUM 1 PUFF: 62.5 AEROSOL, POWDER ORAL at 09:33

## 2020-11-25 RX ADMIN — Medication 2 SPRAY: at 09:32

## 2020-11-25 RX ADMIN — DEXAMETHASONE 2 MG: 2 TABLET ORAL at 09:31

## 2020-11-25 RX ADMIN — APIXABAN 5 MG: 5 TABLET, FILM COATED ORAL at 09:30

## 2020-11-25 RX ADMIN — STANDARDIZED SENNA CONCENTRATE 1 TABLET: 8.6 TABLET ORAL at 09:31

## 2020-11-25 RX ADMIN — ASPIRIN 81 MG: 81 TABLET, CHEWABLE ORAL at 09:30

## 2020-11-25 RX ADMIN — CARBOXYMETHYLCELLULOSE SODIUM 2 DROP: 5 SOLUTION/ DROPS OPHTHALMIC at 09:31

## 2020-11-25 RX ADMIN — METOPROLOL TARTRATE 12.5 MG: 25 TABLET, FILM COATED ORAL at 09:30

## 2020-11-25 RX ADMIN — PAROXETINE 10 MG: 10 TABLET, FILM COATED ORAL at 09:30

## 2020-11-25 RX ADMIN — FAMOTIDINE 20 MG: 20 TABLET ORAL at 09:30

## 2020-11-25 ASSESSMENT — ACTIVITIES OF DAILY LIVING (ADL)
ADLS_ACUITY_SCORE: 26

## 2020-11-25 ASSESSMENT — MIFFLIN-ST. JEOR: SCORE: 1310.75

## 2020-11-25 NOTE — PLAN OF CARE
VSS on High flow oxygen. A/Ox 2 ex place/situation. Up with 2 with lift. Denied N&V. Incontinent of B&B. Will continue to monitor.

## 2020-11-25 NOTE — DISCHARGE SUMMARY
[facility]    Discharge Summary  Hospitalist    Date of Admission:  11/11/2020  Date of Discharge:  11/25/2020  Discharging Provider: Gato Araujo MD    Discharge Diagnoses   Principal Problem:    Covid 19 Pneumonia      Acute on chronic respiratory failure with hypoxia       COPD exacerbation -- O2 at 4 LPM baseline, in NH      NSTEMI, probable demand ischemia   -- Trop peaked 2.001 on 11/12/20       Acute Systolic CHF -- EF 45-50% on Echo, BNP 14,786 on 11/14      Probable chronic Right heart failure related to COPD   -- suggested on Echo 11/11/20      DM type 2, A1C 6.2 on 11/11/20 w Hyperglycemia 2nd to Steroids       Dementia        Paroxysmal A Fib      Dysphagia      Hypothyroidism      Anemia, probably chronic disease    History of Present Illness   79 year old female with COPD/emphysema with chronic hypoxemic respiratory failure on 4 LPM NC O2, depression, chronic venous insufficiency, paroxysmal A. fib, hypothyroidism, oropharyngeal dysphagia, hypertension, chronic constipation, and dementia and bed ridden, hasn't walked for 2 yrs.      Patient presented to outside ER with oxygen saturations in the 70s.  Despite increasing her supplemental O2, they were not able to get any significant improvement in her oxygenation.  She was tested for COVID-19 on 11/8 positive and EMS placed her on 15 LPM O2 but was still hypoxic.  She was transported [place] [facility] [place] evaluation.      Labs at outside hospital:   VBG showed pH 7.40, PCO2 44 and PO2 75.  Procalcitonin was 0.11.  POC cardiac ultrasound showed no pericardial effusion.  Hyperdynamic LV.  Sonographic evidence of hypovolemia.  WBC 4, hemoglobin 11.5, hematocrit 34, platelet count 250, sodium 140, chloride 1047, glucose 143, anion gap 5, bicarb 29, creatinine 0.6, potassium 4.3.  Lactic acid 1.1 CRP 62.86.  Reviewed COVID-19 result which was positive.  Troponin mildly elevated.  Fibrinogen 593, mildly elevated, APTT  30.5.  INR 1.1..  D-dimer 416 (normal less than 229).  Ferritin 246 (normal less than 150).  Chest x-ray from outside hospital reads chronic upper lobe scarring and bilateral interstitial opacities.  COVID-19 PCR positive from yesterday 11/10 from [facility] ER.     1 g Rocephin, 500 mg azithromycin, 2.4L IV fluid, Tylenol, Motrin, duo nebs x3, Solu-Medrol 125 mg, magnesium 2 g IV was given.  Due to lack of bed at outside hospital was transferred to [facility].     Hospital Course   Admtted to medical floor, treated with Decadron, Remdesivir, and Lovenox, and initially given IV antibiotics, but continued to initially deteriorate, and high flow nasal canula O2 at 90% FIO2, and then slowly improved and was on 4 LPM nasal canula O2 at time of discharge.     On arrival here she was full code, and had several conversations with her brother who is legal POA, and eventually he agreed with DNR/DNI.  She refused to participate with therapy, never tried to get out of bed, continued to be pleasantly confused, and has a voracious appetite -- always eating 100% of her food and asking for more.      She will return to her long term care center.  She was weaned off Decadron, and will wean off Pepcid, and will stop Eliquis in 1 week (as DVT prophylaxis related to Covid).      Was seen by Pulmonary, Cardiology, and Infectious Disease while here.       Gato Araujo MD, MD  Pager: [phone]  Cell Phone:  [phone]       Significant Results and Procedures   As above    Pending Results   These results will be followed up by Dr. Araujo  Unresulted Labs Ordered in the Past 30 Days of this Admission     No orders found from 10/12/2020 to 11/12/2020.          Code Status   DNR / DNI       Primary Care Physician   No primary care provider on file.    Physical Exam   Temp: 98  F (36.7  C) Temp src: Oral BP: (!) 163/79 Pulse: 70   Resp: 18 SpO2: 90 % O2 Device: Oxymizer cannula Oxygen Delivery: 4 LPM  Vitals:    11/20/20 0642  11/23/20 0555 11/25/20 0605   Weight: 86.1 kg (189 lb 13.1 oz) 81.7 kg (180 lb 1.9 oz) 80.9 kg (178 lb 5.6 oz)     Vital Signs with Ranges  Temp:  [97.7  F (36.5  C)-98  F (36.7  C)] 98  F (36.7  C)  Pulse:  [67-73] 70  Resp:  [13-20] 18  BP: (116-163)/(57-79) 163/79  FiO2 (%):  [40 %] 40 %  SpO2:  [86 %-96 %] 90 %  I/O last 3 completed shifts:  In: 860 [P.O.:860]  Out: -     Exam on discharge:   Lungs clear  CV with reg S1S2  Neuro -- alert, Ox1.5, pleasant    Discharge Disposition   Discharged to long-term care facility  Condition at discharge: Fair    Consultations This Hospital Stay   WOUND OSTOMY CONTINENCE NURSE  IP CONSULT  PHYSICAL THERAPY ADULT IP CONSULT  PULMONARY IP CONSULT  INFECTIOUS DISEASES IP CONSULT  CARDIOLOGY IP CONSULT  PHARMACY IP CONSULT  PHARMACY IP CONSULT  CARE MANAGEMENT / SOCIAL WORK IP CONSULT  ELECTROPHYSIOLOGY IP CONSULT  SPEECH LANGUAGE PATH ADULT IP CONSULT  CARE MANAGEMENT / SOCIAL WORK IP CONSULT  OCCUPATIONAL THERAPY ADULT IP CONSULT  CARE MANAGEMENT / SOCIAL WORK IP CONSULT    Time Spent on this Encounter   I spent a total of 35 minutes discharging this patient.     Discharge Orders      General info for SNF    Length of Stay Estimate: Long Term Care  Condition at Discharge: Declining  Level of care:skilled   Rehabilitation Potential: Poor  Admission H&P remains valid and up-to-date: Yes  Recent Chemotherapy: N/A  Use Nursing Home Standing Orders: Yes     Mantoux instructions    Give two-step Mantoux (PPD) Per Facility Policy Yes     Reason for your hospital stay    Covid 19 Pneumonia     Activity - Up with nursing assistance     Additional Discharge Instructions    Call Dr. Boucher if any medical questions at Cell Phone [phone].     Follow Up and recommended labs and tests    Follow up with Nursing home physician in 1 week.  Given advanced COPD and dementia, consider further talks with family about possible do not hospitalize, or comfort care if/when declining.     No CPR-  Do NOT Intubate     Oxygen Adult/Peds    Oxygen Documentation:   I certify that this patient, Carolina Frances has been under my care (or a nurse practitioner or physican's assistant working with me). This is the face-to-face encounter for oxygen medical necessity.      Carolina Frances is now in a chronic stable state and continues to require supplemental oxygen. Patient has continued oxygen desaturation due to COPD J44.9.    Alternative treatment(s) tried or considered and deemed clinically infective for treatment of COPD J44.9 include steroids.  If portability is ordered, is the patient mobile within the home? no    **Patients who qualify for home O2 coverage under the CMS guidelines require ABG tests or O2 sat readings obtained closest to, but no earlier than 2 days prior to the discharge, as evidence of the need for home oxygen therapy. Testing must be performed while patient is in the chronic stable state. See notes for O2 sats.**     Advance Diet as Tolerated    Follow this diet upon discharge:       Combination Diet Dysphagia Diet Level 1: Pureed; Nectar Thickened Liquids (pre-thickened or use instant food thickener)     Discharge Medications   Current Discharge Medication List      START taking these medications    Details   albuterol (PROAIR HFA/PROVENTIL HFA/VENTOLIN HFA) 108 (90 Base) MCG/ACT inhaler Inhale 2 puffs into the lungs every 4 hours as needed for shortness of breath / dyspnea  Refills: 0    Comments: Pharmacy may dispense brand covered by insurance (Proair, or proventil or ventolin or generic albuterol inhaler)  Associated Diagnoses: COPD exacerbation (H)      apixaban ANTICOAGULANT (ELIQUIS) 5 MG tablet Take 1 tablet (5 mg) by mouth 2 times daily for 7 days  Qty: 14 tablet, Refills: 0    Associated Diagnoses: Pneumonia due to 2019 novel coronavirus      famotidine (PEPCID) 20 MG tablet Take 1 tablet (20 mg) by mouth 2 times daily  Qty:  , Refills: 0    Comments: For 1 week, then prn  heartburn  Associated Diagnoses: Pneumonia due to 2019 novel coronavirus         CONTINUE these medications which have CHANGED    Details   metoprolol tartrate (LOPRESSOR) 25 MG tablet Take 0.5 tablets (12.5 mg) by mouth 2 times daily  Refills: 0    Associated Diagnoses: Paroxysmal atrial fibrillation with RVR (H)         CONTINUE these medications which have NOT CHANGED    Details   !! acetaminophen (TYLENOL) 325 MG tablet Take 650 mg by mouth 3 times daily      !! acetaminophen (TYLENOL) 325 MG tablet Take 650 mg by mouth 3 times daily as needed for mild pain      alum & mag hydroxide-simethicone (MAALOX) 200-200-20 MG/5ML SUSP suspension Take 30 mLs by mouth 3 times daily as needed for heartburn      aspirin (ASA) 81 MG chewable tablet Take 81 mg by mouth daily      atorvastatin (LIPITOR) 40 MG tablet Take 40 mg by mouth At Bedtime      calcium carbonate-vitamin D (OSCAL W/D) 500-200 MG-UNIT tablet Take 1 tablet by mouth 2 times daily      Dextromethorphan-guaiFENesin  MG/5ML syrup Take 10 mLs by mouth every 4 hours as needed for cough      fluticasone-salmeterol (ADVAIR) 250-50 MCG/DOSE inhaler Inhale 1 puff into the lungs every 12 hours      hypromellose-dextran (HYPROMELLOSE-DEXTRAN 0.3-0.1%) 0.1-0.3 % ophthalmic solution Place 1 drop Into the left eye 4 times daily      ipratropium-albuterol (COMBIVENT RESPIMAT)  MCG/ACT inhaler Inhale 1 puff into the lungs every 4 hours as needed for shortness of breath / dyspnea or wheezing (COPD)      levothyroxine (SYNTHROID/LEVOTHROID) 50 MCG tablet Take 50 mcg by mouth daily      nitroGLYcerin (NITROSTAT) 0.4 MG sublingual tablet Place 0.4 mg under the tongue every 5 minutes as needed for chest pain For chest pain place 1 tablet under the tongue every 5 minutes for 3 doses. If symptoms persist 5 minutes after 1st dose call 911.      PARoxetine (PAXIL) 10 MG tablet Take 10 mg by mouth every morning       polyethylene glycol (MIRALAX) 17 g packet Take 1 packet  by mouth daily      senna (SENOKOT) 8.6 MG tablet Take 1 tablet by mouth 2 times daily      sodium chloride (OCEAN) 0.65 % nasal spray Spray 2 sprays into both nostrils 3 times daily And q4h prn      umeclidinium (INCRUSE ELLIPTA) 62.5 MCG/INH inhaler Inhale 1 puff into the lungs daily       !! - Potential duplicate medications found. Please discuss with provider.      STOP taking these medications       doxycycline hyclate (VIBRAMYCIN) 100 MG capsule Comments:   Reason for Stopping:             Allergies   No Known Allergies  Data   Most Recent 3 CBC's:  Recent Labs   Lab Test 11/22/20  0940 11/21/20  0647 11/20/20  0753   WBC 13.8* 11.6* 14.0*   HGB 11.0* 10.3* 10.8*   MCV 96 97 98    311 334      Most Recent 3 BMP's:  Recent Labs   Lab Test 11/22/20  0940 11/20/20  0753 11/19/20  0711    134 136   POTASSIUM 4.3 4.6 4.0   CHLORIDE 101 99 100   CO2 31 33* 33*   BUN 20 18 18   CR 0.56 0.54 0.47*   ANIONGAP 4 2* 3   AYMINI 8.6 8.3* 8.6   GLC 97 153* 129*     Most Recent 2 LFT's:  Recent Labs   Lab Test 11/18/20  0651 11/16/20  0734   AST 26 24   ALT 23 22   ALKPHOS 72 63   BILITOTAL 0.5 0.7     Most Recent INR's and Anticoagulation Dosing History:  Anticoagulation Dose History     Recent Dosing and Labs Latest Ref Rng & Units 11/12/2020 11/13/2020 11/14/2020 11/15/2020 11/17/2020    INR 0.86 - 1.14 1.01 1.11 1.18(H) 1.17(H) 1.13        Most Recent 3 Troponin's:  Recent Labs   Lab Test 11/17/20  0749 11/14/20  1003 11/14/20  0528   TROPI 0.066* 1.038* 0.833*     Most Recent Cholesterol Panel:No lab results found.  Most Recent 6 Bacteria Isolates From Any Culture (See EPIC Reports for Culture Details):No lab results found.  Most Recent TSH, T4 and A1c Labs:  Recent Labs   Lab Test 11/14/20  0133 11/11/20  1711   TSH 0.90  --    A1C  --  6.2*

## 2020-11-25 NOTE — PROGRESS NOTES
Alert. Oriented to place and person. Discontinued oximyzer. On 4lpm via NC. O2Sat 92%. Diminished on lower lobes. Fully dependent to cares. Contractures x4 extremities. Nectar and Pureed diet. Appetite is good. Bowel sound present in all 4 quadrants.. Pedal pulses palpable, sensation intact and no edema on BLE.

## 2020-11-25 NOTE — PLAN OF CARE
Alert and oriented to place and person. Oxymizer discontinued. On 4lpm of NC. O2Sat 94%.  With diminished breath sound on lower lobes. Nectar thick-pureed diet. Fully dependent. Contractures on 4x extremities.Appetite is good. Bowel sound present in all 4 quadrants. Incontinent B/B. Purewick in place discontinued for discharge. Pedal pulses palpable, sensation intact and no edema on BLE. IV removed.    Discussed discharged instructions with the following: medications & indications, covid 19 precautions when out of hospital, informed patient about her continuation of care in [facility].

## 2020-11-25 NOTE — PROGRESS NOTES
Care Management Discharge Note    Discharge Date: 11/25/20  Expected Time of Departure: Pt to discharge at 1500 via [facility] stretcher transport.    Discharge Disposition: Home;Long Term Care    Discharge Services:      Discharge DME: Oxygen    Discharge Transportation: other (see comments)([facility])    Private pay costs discussed: transportation costs    Persons Notified of Discharge Plans: Charge nurse, bedside nurse, [facility] admissions staff   Patient/Family in Agreement with the Plan: yes    Handoff Referral Completed: Yes    Additional Information:  Pt to discharge today back to her home at [facility] ([facility]). Pt to discharge at 1500 via [facility] dtretcher transport. CECILIA left a voicemail with Kristi in admissions regarding discharge time at 1500. No PAS needed. CECILIA faxed orders via DOD to [facility] and [facility].         ZORAN Garrison

## 2020-11-25 NOTE — PLAN OF CARE
Patient alert to name only.Covid isolation. Total care, purewick for incontinence. Pt angry when cares attempted, refused turn/cares, pt turned despite protest and tolerated well. No resp difficulties, continues on 4L nc. 500+ urine output. Plan is to return back to pt's previous nursing home later today.Social work to arrange transport.

## 2020-12-02 ENCOUNTER — TRANSCRIBE ORDERS (OUTPATIENT)
Dept: OTHER | Age: 80
End: 2020-12-02

## 2020-12-02 DIAGNOSIS — R19.00 PELVIC MASS: Primary | ICD-10-CM

## 2020-12-02 DIAGNOSIS — N94.89 ADNEXAL MASS: Primary | ICD-10-CM

## 2020-12-03 ENCOUNTER — DOCUMENTATION ONLY (OUTPATIENT)
Dept: ONCOLOGY | Facility: CLINIC | Age: 80
End: 2020-12-03

## 2020-12-03 NOTE — PROGRESS NOTES
Action    Action Taken 12/3/20:    -Duplicate Chart w/ [id number], charts marked for merge.        12/14/20: imaging from [facility] resolved, and viewable to PACS  8:37 AM     RECORDS STATUS - ALL OTHER DIAGNOSIS      RECORDS RECEIVED FROM: [facility], [facility]   DATE RECEIVED:    NOTES STATUS DETAILS   OFFICE NOTE from referring provider [facility] Dr. Eric Rodrigues: via 11/11/20 ED to Admission Transfer to [facility] (These notes are in pt's duplicate chart, please see above)   OFFICE NOTE from medical oncologist     DISCHARGE SUMMARY from hospital [facility] Epic  11/10/20 (Please see duplicate chart)    [facility]:  11/30/20   DISCHARGE REPORT from the ER [facility] 11/10/20   OPERATIVE REPORT     MEDICATION LIST     CLINICAL TRIAL TREATMENTS TO DATE     LABS     PATHOLOGY REPORTS NA    ANYTHING RELATED TO DIAGNOSIS Epic/    GENONOMIC TESTING     TYPE:     IMAGING (NEED IMAGES & REPORT)     XR Requested 12/3 11/30/20, 11/10/20: [facility], Reports in [facility]   XR Barium Requested 12/3 12/2/20: [facility], Report in [facility]   CT SCANS Requested 12/3 12/2/20, 11/30/20: [facility], Reports in [facility]   MRI     MAMMO     ULTRASOUND Requested 12/3 12/1/20, 11/30/20: [facility], Reports in [facility]   PET

## 2020-12-07 ENCOUNTER — RECORDS - HEALTHEAST (OUTPATIENT)
Dept: LAB | Facility: CLINIC | Age: 80
End: 2020-12-07

## 2020-12-08 LAB
ERYTHROCYTE [DISTWIDTH] IN BLOOD BY AUTOMATED COUNT: 16.4 % (ref 11–14.5)
HCT VFR BLD AUTO: 30.5 % (ref 35–47)
HGB BLD-MCNC: 9.4 G/DL (ref 12–16)
MCH RBC QN AUTO: 30.5 PG (ref 27–34)
MCHC RBC AUTO-ENTMCNC: 30.8 G/DL (ref 32–36)
MCV RBC AUTO: 99 FL (ref 80–100)
PLATELET # BLD AUTO: 249 THOU/UL (ref 140–440)
PMV BLD AUTO: 11.4 FL (ref 8.5–12.5)
RBC # BLD AUTO: 3.08 MILL/UL (ref 3.8–5.4)
WBC: 6.1 THOU/UL (ref 4–11)

## 2020-12-15 ENCOUNTER — RECORDS - HEALTHEAST (OUTPATIENT)
Dept: LAB | Facility: CLINIC | Age: 80
End: 2020-12-15

## 2020-12-16 LAB
ERYTHROCYTE [DISTWIDTH] IN BLOOD BY AUTOMATED COUNT: 15.8 % (ref 11–14.5)
HCT VFR BLD AUTO: 32.1 % (ref 35–47)
HGB BLD-MCNC: 9.9 G/DL (ref 12–16)
MCH RBC QN AUTO: 30.5 PG (ref 27–34)
MCHC RBC AUTO-ENTMCNC: 30.8 G/DL (ref 32–36)
MCV RBC AUTO: 99 FL (ref 80–100)
PLATELET # BLD AUTO: 404 THOU/UL (ref 140–440)
PMV BLD AUTO: 10.8 FL (ref 8.5–12.5)
RBC # BLD AUTO: 3.25 MILL/UL (ref 3.8–5.4)
WBC: 7.7 THOU/UL (ref 4–11)

## 2021-01-05 ENCOUNTER — PRE VISIT (OUTPATIENT)
Dept: ONCOLOGY | Facility: CLINIC | Age: 81
End: 2021-01-05

## 2021-01-05 NOTE — TELEPHONE ENCOUNTER
ONCOLOGY INTAKE: Records Information      APPT INFORMATION:  Referring provider:  Dr. Mancera  Referring provider s clinic:  [facility]  Reason for visit/diagnosis:  pelvic mass  Has patient been notified of appointment date and time?: yes    RECORDS INFORMATION:  Were the records received with the referral (via [employer])? no    Has patient been seen for any external appt for this diagnosis? n/a    If yes, where? no    Has patient had any imaging or procedures outside of [place] for this condition? n/a      If Yes, where? none    ADDITIONAL INFORMATION:  none

## 2021-01-08 ENCOUNTER — PATIENT OUTREACH (OUTPATIENT)
Dept: ONCOLOGY | Facility: CLINIC | Age: 81
End: 2021-01-08

## 2021-01-11 ENCOUNTER — RECORDS - HEALTHEAST (OUTPATIENT)
Dept: LAB | Facility: CLINIC | Age: 81
End: 2021-01-11

## 2021-01-12 LAB
ERYTHROCYTE [DISTWIDTH] IN BLOOD BY AUTOMATED COUNT: 15.3 % (ref 11–14.5)
HCT VFR BLD AUTO: 33.4 % (ref 35–47)
HGB BLD-MCNC: 10.4 G/DL (ref 12–16)
MCH RBC QN AUTO: 30.1 PG (ref 27–34)
MCHC RBC AUTO-ENTMCNC: 31.1 G/DL (ref 32–36)
MCV RBC AUTO: 97 FL (ref 80–100)
PLATELET # BLD AUTO: 257 THOU/UL (ref 140–440)
PMV BLD AUTO: 11.3 FL (ref 8.5–12.5)
RBC # BLD AUTO: 3.46 MILL/UL (ref 3.8–5.4)
WBC: 6.4 THOU/UL (ref 4–11)

## 2021-02-05 ENCOUNTER — RECORDS - HEALTHEAST (OUTPATIENT)
Dept: LAB | Facility: CLINIC | Age: 81
End: 2021-02-05

## 2021-02-06 ENCOUNTER — RECORDS - HEALTHEAST (OUTPATIENT)
Dept: LAB | Facility: CLINIC | Age: 81
End: 2021-02-06

## 2021-02-06 LAB
ALBUMIN SERPL-MCNC: 3.5 G/DL (ref 3.5–5)
ALBUMIN UR-MCNC: ABNORMAL MG/DL
ALP SERPL-CCNC: 87 U/L (ref 45–120)
ALT SERPL W P-5'-P-CCNC: <9 U/L (ref 0–45)
ANION GAP SERPL CALCULATED.3IONS-SCNC: 10 MMOL/L (ref 5–18)
APPEARANCE UR: ABNORMAL
AST SERPL W P-5'-P-CCNC: 15 U/L (ref 0–40)
BACTERIA #/AREA URNS HPF: ABNORMAL HPF
BASOPHILS # BLD AUTO: 0 THOU/UL (ref 0–0.2)
BASOPHILS NFR BLD AUTO: 0 % (ref 0–2)
BILIRUB SERPL-MCNC: 0.6 MG/DL (ref 0–1)
BILIRUB UR QL STRIP: NEGATIVE
BUN SERPL-MCNC: 13 MG/DL (ref 8–28)
CALCIUM SERPL-MCNC: 9.3 MG/DL (ref 8.5–10.5)
CHLORIDE BLD-SCNC: 102 MMOL/L (ref 98–107)
CO2 SERPL-SCNC: 27 MMOL/L (ref 22–31)
COLOR UR AUTO: YELLOW
CREAT SERPL-MCNC: 0.67 MG/DL (ref 0.6–1.1)
EOSINOPHIL # BLD AUTO: 0.1 THOU/UL (ref 0–0.4)
EOSINOPHIL NFR BLD AUTO: 1 % (ref 0–6)
ERYTHROCYTE [DISTWIDTH] IN BLOOD BY AUTOMATED COUNT: 14.7 % (ref 11–14.5)
GFR SERPL CREATININE-BSD FRML MDRD: >60 ML/MIN/1.73M2
GLUCOSE BLD-MCNC: 99 MG/DL (ref 70–125)
GLUCOSE UR STRIP-MCNC: NEGATIVE MG/DL
HCT VFR BLD AUTO: 33.7 % (ref 35–47)
HGB BLD-MCNC: 10.6 G/DL (ref 12–16)
HGB UR QL STRIP: NEGATIVE
IMM GRANULOCYTES # BLD: 0 THOU/UL
IMM GRANULOCYTES NFR BLD: 0 %
KETONES UR STRIP-MCNC: NEGATIVE MG/DL
LEUKOCYTE ESTERASE UR QL STRIP: NEGATIVE
LYMPHOCYTES # BLD AUTO: 2.3 THOU/UL (ref 0.8–4.4)
LYMPHOCYTES NFR BLD AUTO: 27 % (ref 20–40)
MCH RBC QN AUTO: 29.4 PG (ref 27–34)
MCHC RBC AUTO-ENTMCNC: 31.5 G/DL (ref 32–36)
MCV RBC AUTO: 94 FL (ref 80–100)
MONOCYTES # BLD AUTO: 0.7 THOU/UL (ref 0–0.9)
MONOCYTES NFR BLD AUTO: 8 % (ref 2–10)
MUCOUS THREADS #/AREA URNS LPF: ABNORMAL LPF
NEUTROPHILS # BLD AUTO: 5.5 THOU/UL (ref 2–7.7)
NEUTROPHILS NFR BLD AUTO: 64 % (ref 50–70)
NITRATE UR QL: NEGATIVE
PH UR STRIP: 5.5 [PH] (ref 4.5–8)
PLATELET # BLD AUTO: 240 THOU/UL (ref 140–440)
PMV BLD AUTO: 11.4 FL (ref 8.5–12.5)
POTASSIUM BLD-SCNC: 3.9 MMOL/L (ref 3.5–5)
PROT SERPL-MCNC: 7.1 G/DL (ref 6–8)
RBC # BLD AUTO: 3.6 MILL/UL (ref 3.8–5.4)
RBC #/AREA URNS AUTO: ABNORMAL HPF
SODIUM SERPL-SCNC: 139 MMOL/L (ref 136–145)
SP GR UR STRIP: 1.02 (ref 1–1.03)
SQUAMOUS #/AREA URNS AUTO: ABNORMAL LPF
UROBILINOGEN UR STRIP-ACNC: ABNORMAL
WBC #/AREA URNS AUTO: ABNORMAL HPF
WBC: 8.7 THOU/UL (ref 4–11)

## 2021-02-09 LAB — BACTERIA SPEC CULT: ABNORMAL

## 2021-05-26 ENCOUNTER — RECORDS - HEALTHEAST (OUTPATIENT)
Dept: LAB | Facility: CLINIC | Age: 81
End: 2021-05-26

## 2021-05-27 LAB
ALBUMIN SERPL-MCNC: 3.3 G/DL (ref 3.5–5)
ALP SERPL-CCNC: 93 U/L (ref 45–120)
ALT SERPL W P-5'-P-CCNC: 12 U/L (ref 0–45)
ANION GAP SERPL CALCULATED.3IONS-SCNC: 9 MMOL/L (ref 5–18)
AST SERPL W P-5'-P-CCNC: 19 U/L (ref 0–40)
BILIRUB SERPL-MCNC: 0.5 MG/DL (ref 0–1)
BUN SERPL-MCNC: 18 MG/DL (ref 8–28)
CALCIUM SERPL-MCNC: 9.1 MG/DL (ref 8.5–10.5)
CHLORIDE BLD-SCNC: 101 MMOL/L (ref 98–107)
CO2 SERPL-SCNC: 31 MMOL/L (ref 22–31)
CREAT SERPL-MCNC: 0.67 MG/DL (ref 0.6–1.1)
ERYTHROCYTE [DISTWIDTH] IN BLOOD BY AUTOMATED COUNT: 15 % (ref 11–14.5)
GFR SERPL CREATININE-BSD FRML MDRD: >60 ML/MIN/1.73M2
GLUCOSE BLD-MCNC: 102 MG/DL (ref 70–125)
HCT VFR BLD AUTO: 34.7 % (ref 35–47)
HGB BLD-MCNC: 10.5 G/DL (ref 12–16)
MCH RBC QN AUTO: 28.2 PG (ref 27–34)
MCHC RBC AUTO-ENTMCNC: 30.3 G/DL (ref 32–36)
MCV RBC AUTO: 93 FL (ref 80–100)
PLATELET # BLD AUTO: 232 THOU/UL (ref 140–440)
PMV BLD AUTO: 11.2 FL (ref 8.5–12.5)
POTASSIUM BLD-SCNC: 4.2 MMOL/L (ref 3.5–5)
PROT SERPL-MCNC: 6.6 G/DL (ref 6–8)
RBC # BLD AUTO: 3.72 MILL/UL (ref 3.8–5.4)
SODIUM SERPL-SCNC: 141 MMOL/L (ref 136–145)
WBC: 6.6 THOU/UL (ref 4–11)

## 2022-09-01 NOTE — PROVIDER NOTIFICATION
Notified MD that pt is anxious and agitated, asking for ativan, see new orders   PAST MEDICAL HISTORY:  No pertinent past medical history